# Patient Record
Sex: MALE | Race: WHITE | NOT HISPANIC OR LATINO | Employment: OTHER | URBAN - METROPOLITAN AREA
[De-identification: names, ages, dates, MRNs, and addresses within clinical notes are randomized per-mention and may not be internally consistent; named-entity substitution may affect disease eponyms.]

---

## 2017-01-18 ENCOUNTER — TELEPHONE (OUTPATIENT)
Dept: INTERNAL MEDICINE | Facility: CLINIC | Age: 57
End: 2017-01-18

## 2017-01-18 NOTE — TELEPHONE ENCOUNTER
----- Message from Antonio Domingo sent at 1/18/2017  1:23 PM EST -----  Contact: patient  [facility]

## 2017-01-18 NOTE — TELEPHONE ENCOUNTER
This has already been initiated yesterday.  Pharmacy was notified yesterday.  We are waiting on his insurance.

## 2017-01-25 ENCOUNTER — TELEPHONE (OUTPATIENT)
Dept: INTERNAL MEDICINE | Facility: CLINIC | Age: 57
End: 2017-01-25

## 2017-01-25 RX ORDER — ESCITALOPRAM OXALATE 10 MG/1
10 TABLET ORAL DAILY
Qty: 30 TABLET | Refills: 5 | Status: SHIPPED | OUTPATIENT
Start: 2017-01-25 | End: 2017-03-10 | Stop reason: SDUPTHER

## 2017-01-25 NOTE — TELEPHONE ENCOUNTER
----- Message from Antonio Domingo sent at 1/25/2017  9:04 AM EST -----  Contact: DR. SKAGGS  WOULD LIKE  LEXAPRO 10 MG @ [facility]

## 2017-01-31 RX ORDER — OMEPRAZOLE 40 MG/1
40 CAPSULE, DELAYED RELEASE ORAL DAILY
Qty: 90 CAPSULE | Refills: 3 | Status: SHIPPED | OUTPATIENT
Start: 2017-01-31 | End: 2017-03-10 | Stop reason: SDUPTHER

## 2017-02-10 ENCOUNTER — OFFICE VISIT (OUTPATIENT)
Dept: INTERNAL MEDICINE | Facility: CLINIC | Age: 57
End: 2017-02-10

## 2017-02-10 VITALS
DIASTOLIC BLOOD PRESSURE: 66 MMHG | SYSTOLIC BLOOD PRESSURE: 116 MMHG | HEIGHT: 75 IN | TEMPERATURE: 97.6 F | WEIGHT: 189 LBS | BODY MASS INDEX: 23.5 KG/M2

## 2017-02-10 DIAGNOSIS — F51.01 PRIMARY INSOMNIA: Primary | ICD-10-CM

## 2017-02-10 DIAGNOSIS — Z79.899 HIGH RISK MEDICATIONS (NOT ANTICOAGULANTS) LONG-TERM USE: ICD-10-CM

## 2017-02-10 DIAGNOSIS — F41.9 ANXIETY: ICD-10-CM

## 2017-02-10 DIAGNOSIS — F32.A DEPRESSIVE DISORDER: ICD-10-CM

## 2017-02-10 LAB
AMPHET+METHAMPHET UR QL: NEGATIVE
AMPHETAMINES UR QL: NEGATIVE
BARBITURATES UR QL SCN: NEGATIVE
BENZODIAZ UR QL SCN: POSITIVE
BUPRENORPHINE SERPL-MCNC: NEGATIVE NG/ML
CANNABINOIDS SERPL QL: NEGATIVE
COCAINE UR QL: NEGATIVE
METHADONE UR QL SCN: NEGATIVE
OPIATES UR QL: NEGATIVE
OXYCODONE UR QL SCN: NEGATIVE
PCP UR QL SCN: NEGATIVE
PROPOXYPH UR QL: NEGATIVE
TRICYCLICS UR QL SCN: NEGATIVE

## 2017-02-10 PROCEDURE — 99213 OFFICE O/P EST LOW 20 MIN: CPT | Performed by: INTERNAL MEDICINE

## 2017-02-10 PROCEDURE — 80306 DRUG TEST PRSMV INSTRMNT: CPT | Performed by: INTERNAL MEDICINE

## 2017-02-10 RX ORDER — TEMAZEPAM 30 MG/1
30 CAPSULE ORAL NIGHTLY PRN
Qty: 30 CAPSULE | Refills: 2 | Status: SHIPPED | OUTPATIENT
Start: 2017-02-10 | End: 2017-03-13 | Stop reason: SDUPTHER

## 2017-02-10 NOTE — PROGRESS NOTES
"Subjective   Tyler Doss is a 56 y.o. male.     History of Present Illness     Anxiety - taking lexapro 1/2 of 10mg and has really helped. Doesn't feel as angry, mood calmer. Hasn't needed klonopin since starting, though was not using very often anyway, as made him feel quite sluggish    Insomnia - takes temazepam nightly, no missed doses. Sleeping well, tries to get 7-8hrs a night    Hyperlipidemia - off the statins as they made him ache. Is trying to eat healthy and exercise regularly and keep weight off    The following portions of the patient's history were reviewed and updated as appropriate: allergies, current medications, past family history, past medical history, past social history, past surgical history and problem list.    Review of Systems   Constitutional: Negative for activity change, appetite change and unexpected weight change.   Musculoskeletal: Negative for myalgias.   Psychiatric/Behavioral: Negative for dysphoric mood. The patient is not nervous/anxious.         Mood better       Objective   Blood pressure 116/66, temperature 97.6 °F (36.4 °C), temperature source Temporal Artery , height 74.6\" (189.5 cm), weight 189 lb (85.7 kg).  Physical Exam   Constitutional: He is oriented to person, place, and time. He appears well-developed and well-nourished. No distress.   HENT:   Head: Normocephalic and atraumatic.   Eyes: Conjunctivae and EOM are normal.   Cardiovascular: Normal rate, regular rhythm and normal heart sounds.  Exam reveals no gallop and no friction rub.    No murmur heard.  Pulmonary/Chest: Effort normal and breath sounds normal. No respiratory distress. He has no wheezes.   Neurological: He is alert and oriented to person, place, and time.   Skin: Skin is warm and dry. He is not diaphoretic.   Psychiatric: He has a normal mood and affect. His behavior is normal. Judgment and thought content normal.       Assessment/Plan   Tyler was seen today for med refill.    Diagnoses and all " orders for this visit:    Primary insomnia. Pt has already signed controlled substance contract. Hema done today and appropriate. UDS due today  -     temazepam (RESTORIL) 30 MG capsule; Take 1 capsule by mouth At Night As Needed for sleep.  -     Urine Drug Screen      Anxiety/Depressive disorder - doing well on lexapro    He will schedule physical

## 2017-02-21 ENCOUNTER — OFFICE VISIT (OUTPATIENT)
Dept: INTERNAL MEDICINE | Facility: CLINIC | Age: 57
End: 2017-02-21

## 2017-02-21 VITALS
SYSTOLIC BLOOD PRESSURE: 100 MMHG | TEMPERATURE: 98.9 F | WEIGHT: 187 LBS | DIASTOLIC BLOOD PRESSURE: 76 MMHG | BODY MASS INDEX: 23.62 KG/M2

## 2017-02-21 DIAGNOSIS — R53.83 FATIGUE, UNSPECIFIED TYPE: ICD-10-CM

## 2017-02-21 DIAGNOSIS — R59.1 LYMPHADENOPATHY OF HEAD AND NECK: ICD-10-CM

## 2017-02-21 DIAGNOSIS — J20.9 ACUTE BRONCHITIS, UNSPECIFIED ORGANISM: ICD-10-CM

## 2017-02-21 DIAGNOSIS — R05.9 COUGH: Primary | ICD-10-CM

## 2017-02-21 DIAGNOSIS — R09.81 NASAL CONGESTION: ICD-10-CM

## 2017-02-21 DIAGNOSIS — R06.7 SNEEZING: ICD-10-CM

## 2017-02-21 DIAGNOSIS — R51.9 HEADACHE, UNSPECIFIED HEADACHE TYPE: ICD-10-CM

## 2017-02-21 DIAGNOSIS — R52 BODY ACHES: ICD-10-CM

## 2017-02-21 LAB
BASOPHILS # BLD AUTO: 0.01 10*3/MM3 (ref 0–0.2)
BASOPHILS NFR BLD AUTO: 0.1 % (ref 0–1)
DEPRECATED RDW RBC AUTO: 48 FL (ref 37–54)
EOSINOPHIL # BLD AUTO: 0.33 10*3/MM3 (ref 0.1–0.3)
EOSINOPHIL NFR BLD AUTO: 4.9 % (ref 0–3)
ERYTHROCYTE [DISTWIDTH] IN BLOOD BY AUTOMATED COUNT: 13.6 % (ref 11.3–14.5)
EXPIRATION DATE: NORMAL
EXPIRATION DATE: NORMAL
FLUAV AG NPH QL: NEGATIVE
FLUBV AG NPH QL: NEGATIVE
HCT VFR BLD AUTO: 44.7 % (ref 38.9–50.9)
HGB BLD-MCNC: 14.5 G/DL (ref 13.1–17.5)
IMM GRANULOCYTES # BLD: 0.02 10*3/MM3 (ref 0–0.03)
IMM GRANULOCYTES NFR BLD: 0.3 % (ref 0–0.6)
INTERNAL CONTROL: NORMAL
INTERNAL CONTROL: NORMAL
LYMPHOCYTES # BLD AUTO: 1.46 10*3/MM3 (ref 0.6–4.8)
LYMPHOCYTES NFR BLD AUTO: 21.9 % (ref 24–44)
Lab: NORMAL
Lab: NORMAL
MCH RBC QN AUTO: 30.9 PG (ref 27–31)
MCHC RBC AUTO-ENTMCNC: 32.4 G/DL (ref 32–36)
MCV RBC AUTO: 95.1 FL (ref 80–99)
MONOCYTES # BLD AUTO: 1.02 10*3/MM3 (ref 0–1)
MONOCYTES NFR BLD AUTO: 15.3 % (ref 0–12)
NEUTROPHILS # BLD AUTO: 3.83 10*3/MM3 (ref 1.5–8.3)
NEUTROPHILS NFR BLD AUTO: 57.5 % (ref 41–71)
PLATELET # BLD AUTO: 287 10*3/MM3 (ref 150–450)
PMV BLD AUTO: 11.2 FL (ref 6–12)
RBC # BLD AUTO: 4.7 10*6/MM3 (ref 4.2–5.76)
S PYO AG THROAT QL: NEGATIVE
WBC NRBC COR # BLD: 6.67 10*3/MM3 (ref 3.5–10.8)

## 2017-02-21 PROCEDURE — 87804 INFLUENZA ASSAY W/OPTIC: CPT | Performed by: NURSE PRACTITIONER

## 2017-02-21 PROCEDURE — 99214 OFFICE O/P EST MOD 30 MIN: CPT | Performed by: NURSE PRACTITIONER

## 2017-02-21 PROCEDURE — 85025 COMPLETE CBC W/AUTO DIFF WBC: CPT | Performed by: NURSE PRACTITIONER

## 2017-02-21 PROCEDURE — 87880 STREP A ASSAY W/OPTIC: CPT | Performed by: NURSE PRACTITIONER

## 2017-02-21 RX ORDER — DEXTROMETHORPHAN HYDROBROMIDE AND PROMETHAZINE HYDROCHLORIDE 15; 6.25 MG/5ML; MG/5ML
10 SYRUP ORAL 3 TIMES DAILY PRN
Qty: 180 ML | Refills: 0 | Status: SHIPPED | OUTPATIENT
Start: 2017-02-21 | End: 2017-05-17

## 2017-02-21 RX ORDER — CLARITHROMYCIN 500 MG/1
500 TABLET, COATED ORAL 2 TIMES DAILY
Qty: 20 TABLET | Refills: 0 | Status: SHIPPED | OUTPATIENT
Start: 2017-02-21 | End: 2017-05-17

## 2017-02-21 NOTE — PROGRESS NOTES
Subjective   Tyler Doss is a 56 y.o. male    Chief Complaint   Patient presents with   • Cough     Productive dark green sputum   • Generalized Body Aches   • Headache   • sneezing   • Nasal Congestion   • URI     Chest congestion   • Nausea   • Fatigue   • Chills     Chills on Sat. Took 2 Tylenol this morning     URI    This is a new problem. Episode onset: 3 days ago. The problem has been gradually improving. There has been no fever (but has felt chilled). Associated symptoms include congestion, coughing (productive - green/thick), ear pain, headaches, a plugged ear sensation, rhinorrhea, sinus pain, a sore throat and swollen glands. Pertinent negatives include no abdominal pain, chest pain, diarrhea, dysuria, joint pain, joint swelling, nausea, neck pain, rash, sneezing, vomiting or wheezing. Treatments tried: OTC cold meds. The treatment provided no relief.      Son recently with flu A.  He was given a script for Tamiflu, but it caused difficulty breathing after one dose so he stopped it.    The following portions of the patient's history were reviewed and updated as appropriate: allergies, current medications, past family history, past medical history, past social history, past surgical history and problem list.    Current Outpatient Prescriptions:   •  clarithromycin (BIAXIN) 500 MG tablet, Take 1 tablet by mouth 2 (Two) Times a Day., Disp: 20 tablet, Rfl: 0  •  clonazePAM (KLONOPIN) 0.5 MG tablet, 1 qd prn anxiety, Disp: 30 tablet, Rfl: 2  •  DYMISTA 137-50 MCG/ACT suspension, 137 bottles into each nostril 2 (Two) Times a Day., Disp: 1 bottle, Rfl: 11  •  escitalopram (LEXAPRO) 10 MG tablet, Take 1 tablet by mouth Daily., Disp: 30 tablet, Rfl: 5  •  omeprazole (priLOSEC) 40 MG capsule, Take 1 capsule by mouth Daily., Disp: 90 capsule, Rfl: 3  •  promethazine-dextromethorphan (PROMETHAZINE-DM) 6.25-15 MG/5ML syrup, Take 10 mL by mouth 3 (Three) Times a Day As Needed for cough., Disp: 180 mL, Rfl: 0  •   propranolol (INDERAL) 10 MG tablet, Take 1 tablet by mouth as needed (for anxiety)., Disp: 90 tablet, Rfl: 3  •  temazepam (RESTORIL) 30 MG capsule, Take 1 capsule by mouth At Night As Needed for sleep., Disp: 30 capsule, Rfl: 2  •  valACYclovir (VALTREX) 1000 MG tablet, Take 1 tablet by mouth 3 (Three) Times a Day., Disp: 21 tablet, Rfl: 0  •  vilazodone (VIIBRYD) 20 MG tablet tablet, 1/2 qd x 1week, then 1 qd x 1 week, then 2 qd, Disp: 39 tablet, Rfl: 0     Review of Systems   Constitutional: Negative for chills, fatigue and fever.   HENT: Positive for congestion, ear pain, rhinorrhea and sore throat. Negative for sneezing.    Respiratory: Positive for cough (productive - green/thick). Negative for chest tightness, shortness of breath and wheezing.    Cardiovascular: Negative for chest pain.   Gastrointestinal: Negative for abdominal pain, diarrhea, nausea and vomiting.   Endocrine: Negative for cold intolerance and heat intolerance.   Genitourinary: Negative for dysuria.   Musculoskeletal: Negative for arthralgias, joint pain and neck pain.   Skin: Negative for rash.   Neurological: Positive for headaches. Negative for dizziness.       Objective   Physical Exam   Constitutional: He is oriented to person, place, and time. He appears well-developed and well-nourished.   HENT:   Head: Normocephalic and atraumatic.   Right Ear: A middle ear effusion is present.   Left Ear: A middle ear effusion is present.   Nose: Right sinus exhibits no maxillary sinus tenderness and no frontal sinus tenderness. Left sinus exhibits no maxillary sinus tenderness and no frontal sinus tenderness.   Mouth/Throat: Posterior oropharyngeal edema and posterior oropharyngeal erythema present.   Eyes: Conjunctivae and EOM are normal. Pupils are equal, round, and reactive to light.   Neck: Normal range of motion.   Cardiovascular: Normal rate, regular rhythm and normal heart sounds.    Pulmonary/Chest: Effort normal and breath sounds normal.    Abdominal: Soft. Bowel sounds are normal.   Musculoskeletal: Normal range of motion.   Lymphadenopathy:     He has cervical adenopathy.        Right cervical: Superficial cervical adenopathy present.        Left cervical: Superficial cervical adenopathy present.   Neurological: He is alert and oriented to person, place, and time. He has normal reflexes.   Skin: Skin is warm and dry.   Psychiatric: He has a normal mood and affect. His behavior is normal. Judgment and thought content normal.     Vitals:    02/21/17 1351   BP: 100/76   Temp: 98.9 °F (37.2 °C)   TempSrc: Temporal Artery    Weight: 187 lb (84.8 kg)         Assessment/Plan   Tyler was seen today for cough, generalized body aches, headache, sneezing, nasal congestion, uri, nausea, fatigue and chills.    Diagnoses and all orders for this visit:    Cough  -     POC Influenza A / B  -     promethazine-dextromethorphan (PROMETHAZINE-DM) 6.25-15 MG/5ML syrup; Take 10 mL by mouth 3 (Three) Times a Day As Needed for cough.    Body aches  -     POC Influenza A / B    Fatigue, unspecified type  -     POC Influenza A / B    Headache, unspecified headache type  -     POC Influenza A / B    Sneezing  -     POC Influenza A / B    Nasal congestion  -     POC Influenza A / B    Lymphadenopathy of head and neck  -     CBC & Differential  -     CBC Auto Differential  -     POC Rapid Strep A    Acute bronchitis, unspecified organism  -     clarithromycin (BIAXIN) 500 MG tablet; Take 1 tablet by mouth 2 (Two) Times a Day.  -     promethazine-dextromethorphan (PROMETHAZINE-DM) 6.25-15 MG/5ML syrup; Take 10 mL by mouth 3 (Three) Times a Day As Needed for cough.    Rapid strep and flu negative  CBC sent due to enlarged lymph nodes  Meds as directed  No work until Thursday  F/U if sx's worsen or do not improve

## 2017-03-09 RX ORDER — AZELASTINE 1 MG/ML
2 SPRAY, METERED NASAL 2 TIMES DAILY
Qty: 1 EACH | Refills: 12 | Status: SHIPPED | OUTPATIENT
Start: 2017-03-09 | End: 2018-03-27 | Stop reason: SDUPTHER

## 2017-03-09 RX ORDER — FLUTICASONE PROPIONATE 50 MCG
2 SPRAY, SUSPENSION (ML) NASAL DAILY
Qty: 1 EACH | Refills: 12 | Status: SHIPPED | OUTPATIENT
Start: 2017-03-09 | End: 2017-04-08

## 2017-03-10 ENCOUNTER — TELEPHONE (OUTPATIENT)
Dept: INTERNAL MEDICINE | Facility: CLINIC | Age: 57
End: 2017-03-10

## 2017-03-10 RX ORDER — ESCITALOPRAM OXALATE 10 MG/1
10 TABLET ORAL DAILY
Qty: 90 TABLET | Refills: 1 | Status: SHIPPED | OUTPATIENT
Start: 2017-03-10 | End: 2017-05-17 | Stop reason: SDUPTHER

## 2017-03-10 RX ORDER — OMEPRAZOLE 40 MG/1
40 CAPSULE, DELAYED RELEASE ORAL DAILY
Qty: 90 CAPSULE | Refills: 3 | Status: SHIPPED | OUTPATIENT
Start: 2017-03-10 | End: 2018-03-27 | Stop reason: SDUPTHER

## 2017-03-10 NOTE — TELEPHONE ENCOUNTER
----- Message from Augusta Frederick sent at 3/10/2017  3:39 PM EST -----  Patient's spouse called to get a 90 day refill of his lexapro 10mg, omeprazole 40mg, and temazepam 30mg called in to [facility] please. Thanks

## 2017-03-13 RX ORDER — TEMAZEPAM 30 MG/1
30 CAPSULE ORAL NIGHTLY PRN
Qty: 90 CAPSULE | Refills: 0 | Status: SHIPPED | OUTPATIENT
Start: 2017-03-13 | End: 2017-06-19 | Stop reason: SDUPTHER

## 2017-03-28 ENCOUNTER — TELEPHONE (OUTPATIENT)
Dept: INTERNAL MEDICINE | Facility: CLINIC | Age: 57
End: 2017-03-28

## 2017-03-28 DIAGNOSIS — Z00.00 ROUTINE MEDICAL EXAM: Primary | ICD-10-CM

## 2017-03-28 DIAGNOSIS — Z00.00 ENCOUNTER FOR ANNUAL PHYSICAL EXAM: Primary | ICD-10-CM

## 2017-03-28 NOTE — TELEPHONE ENCOUNTER
----- Message from Lisandra Rodriguez sent at 3/28/2017 12:48 PM EDT -----  PT COMING IN FOR LABS ON [id number]

## 2017-04-14 DIAGNOSIS — R73.09 ELEVATED GLUCOSE: Primary | ICD-10-CM

## 2017-04-16 DIAGNOSIS — Z00.00 ROUTINE ADULT HEALTH MAINTENANCE: Primary | ICD-10-CM

## 2017-05-17 ENCOUNTER — OFFICE VISIT (OUTPATIENT)
Dept: INTERNAL MEDICINE | Facility: CLINIC | Age: 57
End: 2017-05-17

## 2017-05-17 VITALS
DIASTOLIC BLOOD PRESSURE: 78 MMHG | WEIGHT: 189 LBS | RESPIRATION RATE: 12 BRPM | SYSTOLIC BLOOD PRESSURE: 112 MMHG | HEART RATE: 69 BPM | HEIGHT: 74 IN | OXYGEN SATURATION: 97 % | BODY MASS INDEX: 24.26 KG/M2 | TEMPERATURE: 97.4 F

## 2017-05-17 DIAGNOSIS — G47.33 OSA (OBSTRUCTIVE SLEEP APNEA): ICD-10-CM

## 2017-05-17 DIAGNOSIS — Z00.00 ENCOUNTER FOR ANNUAL PHYSICAL EXAM: Primary | ICD-10-CM

## 2017-05-17 DIAGNOSIS — F51.01 PRIMARY INSOMNIA: ICD-10-CM

## 2017-05-17 DIAGNOSIS — N52.9 ERECTILE DYSFUNCTION, UNSPECIFIED ERECTILE DYSFUNCTION TYPE: ICD-10-CM

## 2017-05-17 DIAGNOSIS — R53.83 OTHER FATIGUE: ICD-10-CM

## 2017-05-17 LAB
BILIRUB BLD-MCNC: NEGATIVE MG/DL
CLARITY, POC: CLEAR
COLOR UR: YELLOW
GLUCOSE UR STRIP-MCNC: NEGATIVE MG/DL
KETONES UR QL: NEGATIVE
LEUKOCYTE EST, POC: NEGATIVE
NITRITE UR-MCNC: NEGATIVE MG/ML
PH UR: 7 [PH] (ref 5–8)
PROT UR STRIP-MCNC: NEGATIVE MG/DL
RBC # UR STRIP: NEGATIVE /UL
SP GR UR: 1.01 (ref 1–1.03)
UROBILINOGEN UR QL: NORMAL

## 2017-05-17 PROCEDURE — 81003 URINALYSIS AUTO W/O SCOPE: CPT | Performed by: INTERNAL MEDICINE

## 2017-05-17 PROCEDURE — 99396 PREV VISIT EST AGE 40-64: CPT | Performed by: INTERNAL MEDICINE

## 2017-05-17 RX ORDER — TEMAZEPAM 30 MG/1
30 CAPSULE ORAL NIGHTLY PRN
Qty: 90 CAPSULE | Refills: 0 | Status: CANCELLED | OUTPATIENT
Start: 2017-05-17

## 2017-05-17 RX ORDER — ESCITALOPRAM OXALATE 10 MG/1
10 TABLET ORAL DAILY
Qty: 90 TABLET | Refills: 3 | Status: SHIPPED | OUTPATIENT
Start: 2017-05-17 | End: 2017-09-20 | Stop reason: SINTOL

## 2017-05-20 ENCOUNTER — LAB (OUTPATIENT)
Dept: LAB | Facility: HOSPITAL | Age: 57
End: 2017-05-20

## 2017-05-20 DIAGNOSIS — R53.83 OTHER FATIGUE: ICD-10-CM

## 2017-05-20 DIAGNOSIS — N52.9 ERECTILE DYSFUNCTION, UNSPECIFIED ERECTILE DYSFUNCTION TYPE: ICD-10-CM

## 2017-05-20 LAB — TESTOST SERPL-MCNC: 629.38 NG/DL (ref 86.98–780.1)

## 2017-05-20 PROCEDURE — 84443 ASSAY THYROID STIM HORMONE: CPT | Performed by: INTERNAL MEDICINE

## 2017-05-20 PROCEDURE — 84403 ASSAY OF TOTAL TESTOSTERONE: CPT

## 2017-05-20 PROCEDURE — 84153 ASSAY OF PSA TOTAL: CPT | Performed by: INTERNAL MEDICINE

## 2017-05-20 PROCEDURE — 80053 COMPREHEN METABOLIC PANEL: CPT | Performed by: INTERNAL MEDICINE

## 2017-05-20 PROCEDURE — 36415 COLL VENOUS BLD VENIPUNCTURE: CPT

## 2017-05-21 DIAGNOSIS — Z00.00 ROUTINE GENERAL MEDICAL EXAMINATION AT A HEALTH CARE FACILITY: Primary | ICD-10-CM

## 2017-05-21 LAB
ALBUMIN SERPL-MCNC: 4.5 G/DL (ref 3.2–4.8)
ALBUMIN/GLOB SERPL: 1.9 G/DL (ref 1.5–2.5)
ALP SERPL-CCNC: 46 U/L (ref 25–100)
ALT SERPL W P-5'-P-CCNC: 20 U/L (ref 7–40)
ANION GAP SERPL CALCULATED.3IONS-SCNC: 12 MMOL/L (ref 3–11)
AST SERPL-CCNC: 21 U/L (ref 0–33)
BILIRUB SERPL-MCNC: 0.8 MG/DL (ref 0.3–1.2)
BUN BLD-MCNC: 16 MG/DL (ref 9–23)
BUN/CREAT SERPL: 17.8 (ref 7–25)
CALCIUM SPEC-SCNC: 10.6 MG/DL (ref 8.7–10.4)
CHLORIDE SERPL-SCNC: 112 MMOL/L (ref 99–109)
CO2 SERPL-SCNC: 22 MMOL/L (ref 20–31)
CREAT BLD-MCNC: 0.9 MG/DL (ref 0.6–1.3)
GFR SERPL CREATININE-BSD FRML MDRD: 87 ML/MIN/1.73
GLOBULIN UR ELPH-MCNC: 2.4 GM/DL
GLUCOSE BLD-MCNC: 107 MG/DL (ref 70–100)
POTASSIUM BLD-SCNC: 4.4 MMOL/L (ref 3.5–5.5)
PROT SERPL-MCNC: 6.9 G/DL (ref 5.7–8.2)
PSA SERPL-MCNC: 0.25 NG/ML (ref 0–4)
SODIUM BLD-SCNC: 146 MMOL/L (ref 132–146)
TSH SERPL DL<=0.05 MIU/L-ACNC: 2.79 MIU/ML (ref 0.35–5.35)

## 2017-06-16 ENCOUNTER — TELEPHONE (OUTPATIENT)
Dept: INTERNAL MEDICINE | Facility: CLINIC | Age: 57
End: 2017-06-16

## 2017-06-16 NOTE — TELEPHONE ENCOUNTER
lmom for patient to call me back about this. We will have to look outside of [facility] for other sleep specialists.

## 2017-06-16 NOTE — TELEPHONE ENCOUNTER
Dr. Doss called wanting to know if there is another sleep study location that could get Mr. Doss sooner than Nov, please call to advise

## 2017-06-19 ENCOUNTER — TELEPHONE (OUTPATIENT)
Dept: INTERNAL MEDICINE | Facility: CLINIC | Age: 57
End: 2017-06-19

## 2017-06-19 RX ORDER — TEMAZEPAM 30 MG/1
30 CAPSULE ORAL NIGHTLY PRN
Qty: 90 CAPSULE | Refills: 0 | Status: SHIPPED | OUTPATIENT
Start: 2017-06-19 | End: 2017-09-20 | Stop reason: SDUPTHER

## 2017-09-15 ENCOUNTER — TELEPHONE (OUTPATIENT)
Dept: INTERNAL MEDICINE | Facility: CLINIC | Age: 57
End: 2017-09-15

## 2017-09-15 NOTE — TELEPHONE ENCOUNTER
PT NEEDS REFILL TEMAZEPAM 30 MG SENT TO MAIL ORDER PT DID NOT KNOW THE MAIL ORDER PHARMACY NAME AND HE NEEDS A 90 DAY SUPPY

## 2017-09-15 NOTE — TELEPHONE ENCOUNTER
Pn: he needs a 3 month follow up for his med refills.  He has enough to get him through until his appointment.

## 2017-09-20 ENCOUNTER — OFFICE VISIT (OUTPATIENT)
Dept: INTERNAL MEDICINE | Facility: CLINIC | Age: 57
End: 2017-09-20

## 2017-09-20 VITALS
TEMPERATURE: 97.7 F | BODY MASS INDEX: 25.82 KG/M2 | SYSTOLIC BLOOD PRESSURE: 102 MMHG | WEIGHT: 201.2 LBS | HEIGHT: 74 IN | DIASTOLIC BLOOD PRESSURE: 68 MMHG

## 2017-09-20 DIAGNOSIS — K21.9 GASTROESOPHAGEAL REFLUX DISEASE WITHOUT ESOPHAGITIS: ICD-10-CM

## 2017-09-20 DIAGNOSIS — R73.09 ELEVATED GLUCOSE: ICD-10-CM

## 2017-09-20 DIAGNOSIS — F51.01 PRIMARY INSOMNIA: Primary | ICD-10-CM

## 2017-09-20 DIAGNOSIS — F41.9 ANXIETY: ICD-10-CM

## 2017-09-20 DIAGNOSIS — F32.89 OTHER DEPRESSION: ICD-10-CM

## 2017-09-20 DIAGNOSIS — E78.00 PURE HYPERCHOLESTEROLEMIA: ICD-10-CM

## 2017-09-20 LAB
CRP SERPL-MCNC: <0.012 MG/DL (ref 0–0.5)
HBA1C MFR BLD: 5.5 % (ref 4.8–5.6)

## 2017-09-20 PROCEDURE — 36415 COLL VENOUS BLD VENIPUNCTURE: CPT | Performed by: INTERNAL MEDICINE

## 2017-09-20 PROCEDURE — 83036 HEMOGLOBIN GLYCOSYLATED A1C: CPT | Performed by: INTERNAL MEDICINE

## 2017-09-20 PROCEDURE — 86141 C-REACTIVE PROTEIN HS: CPT | Performed by: INTERNAL MEDICINE

## 2017-09-20 PROCEDURE — 99214 OFFICE O/P EST MOD 30 MIN: CPT | Performed by: INTERNAL MEDICINE

## 2017-09-20 RX ORDER — BUPROPION HYDROCHLORIDE 150 MG/1
150 TABLET ORAL DAILY
Qty: 30 TABLET | Refills: 5 | Status: SHIPPED | OUTPATIENT
Start: 2017-09-20 | End: 2017-12-15 | Stop reason: SINTOL

## 2017-09-20 RX ORDER — RANITIDINE 150 MG/1
150 CAPSULE ORAL 2 TIMES DAILY
Qty: 180 CAPSULE | Refills: 1 | Status: SHIPPED | OUTPATIENT
Start: 2017-09-20 | End: 2018-03-30

## 2017-09-20 RX ORDER — CLONAZEPAM 0.5 MG/1
TABLET ORAL
Qty: 30 TABLET | Refills: 2 | Status: SHIPPED | OUTPATIENT
Start: 2017-09-20 | End: 2018-06-15 | Stop reason: SDUPTHER

## 2017-09-20 RX ORDER — TEMAZEPAM 30 MG/1
30 CAPSULE ORAL NIGHTLY PRN
Qty: 90 CAPSULE | Refills: 0 | Status: SHIPPED | OUTPATIENT
Start: 2017-09-20 | End: 2017-12-15 | Stop reason: SDUPTHER

## 2017-09-20 RX ORDER — CLONAZEPAM 0.5 MG/1
0.5 TABLET ORAL 2 TIMES DAILY PRN
COMMUNITY
End: 2017-09-20 | Stop reason: SDUPTHER

## 2017-09-20 NOTE — PROGRESS NOTES
"Subjective   Tyler Doss is a 56 y.o. male.     History of Present Illness   Here for f/u on insomnia and anxiety    Insomnia - he is using temazepam at night and works well. Sleeping well overall    Anxiety - uses klonopin very rarely. Last filled in 12/16, but does need refill as only has a few left. He had been on the lexapro 1/2 but felt very sleepy w/ it, even the low dose, so had to stop  .still some depression but not too bad. Trying to exercise daily and get plenty of rest.  Had been on Wellbutrin years ago and remembers having hair loss w/ it, but is willing to try again. He feels like most of the SSRIs make him tired    gerd - he has been on omeprazole daily. If he misses a dose he will get GERD. Tries to watch diet. He is wondering about long-term use of it though. Did have EGD in 9/14 which was neg    Constipation - uses miralax daily and does help but still some trouble periodically if he eats too much in one setting. He had colon in 8/15- dilated and slightly tortuous colon. repeat in 5 yrs     Wife wants him to get a1c and hsCRP (FH of CAD and DM)    The following portions of the patient's history were reviewed and updated as appropriate: allergies, current medications, past family history, past medical history, past social history, past surgical history and problem list.    Review of Systems   Constitutional: Negative for activity change, appetite change, chills, diaphoresis and unexpected weight change (has lost wt intentionally).   Gastrointestinal: Positive for constipation (has to take miralx daily to stay regular).        Miralax daily, but still some trouble w/ BMs backing up still   Musculoskeletal: Positive for arthralgias.   Psychiatric/Behavioral: Positive for dysphoric mood and sleep disturbance. Negative for agitation. The patient is nervous/anxious.        Objective   Blood pressure 102/68, temperature 97.7 °F (36.5 °C), temperature source Temporal Artery , height 74.4\" (189 cm), " weight 201 lb 3.2 oz (91.3 kg).  Physical Exam   Constitutional: He is oriented to person, place, and time. He appears well-developed and well-nourished. No distress.   HENT:   Head: Normocephalic and atraumatic.   Eyes: Conjunctivae and EOM are normal.   Cardiovascular: Normal rate, regular rhythm and normal heart sounds.  Exam reveals no gallop and no friction rub.    No murmur heard.  Pulmonary/Chest: Effort normal and breath sounds normal. No respiratory distress. He has no wheezes.   Neurological: He is alert and oriented to person, place, and time.   Skin: Skin is warm and dry. He is not diaphoretic.   Psychiatric: He has a normal mood and affect. His behavior is normal. Judgment and thought content normal.       Assessment/Plan   Tyler was seen today for med refill.    Diagnoses and all orders for this visit:    Primary insomnia -Pt has already signed controlled substance contract. Hema done today and appropriate. UDS due 2/18  -     temazepam (RESTORIL) 30 MG capsule; Take 1 capsule by mouth At Night As Needed for Sleep.    Anxiety - uses klonopin just occasionally  -     clonazePAM (KlonoPIN) 0.5 MG tablet; 1 qd prn anxiety    Pure hypercholesterolemia - statin intolerant  -     High Sensitivity CRP    Elevated glucose  -     Hemoglobin A1c    Gastroesophageal reflux disease without esophagitis - try to decrease omeprazole to qod then furhter as tolerated and will use zantac instead  -     ranitidine (ZANTAC) 150 MG capsule; Take 1 capsule by mouth 2 (Two) Times a Day.    Other depression - will tyr low dose wellbutrin in am  -     buPROPion XL (WELLBUTRIN XL) 150 MG 24 hr tablet; Take 1 tablet by mouth Daily.    Constipation - chronic, h/o tortuous colon - continue miralax. Add probiotic. Might try linzess or amitiza again -we did use in past but pt can't remember how they worked - will review chart    He declines flu vaccine

## 2017-09-24 RX ORDER — LUBIPROSTONE 24 UG/1
24 CAPSULE ORAL 2 TIMES DAILY WITH MEALS
Qty: 60 CAPSULE | Refills: 5 | Status: SHIPPED | OUTPATIENT
Start: 2017-09-24 | End: 2017-12-15

## 2017-09-29 ENCOUNTER — TELEPHONE (OUTPATIENT)
Dept: INTERNAL MEDICINE | Facility: CLINIC | Age: 57
End: 2017-09-29

## 2017-09-29 NOTE — TELEPHONE ENCOUNTER
----- Message from Ya Shafer MD sent at 9/28/2017  4:21 PM EDT -----  Regarding: RE: Justin CHURCHILL  No - movantik is more for patients on narcotics who have constipation, so I think the amitiza would be better option  ----- Message -----     From: Keila Brooke MA     Sent: 9/28/2017  10:52 AM       To: Ya Shafer MD  Subject: Justin CHURCHILL                                       When I started the PA for amitiza it popped up that movantik was covered by his insurance. Do you want to try that?

## 2017-10-02 ENCOUNTER — TELEPHONE (OUTPATIENT)
Dept: INTERNAL MEDICINE | Facility: CLINIC | Age: 57
End: 2017-10-02

## 2017-11-10 ENCOUNTER — TELEPHONE (OUTPATIENT)
Dept: INTERNAL MEDICINE | Facility: CLINIC | Age: 57
End: 2017-11-10

## 2017-12-15 ENCOUNTER — OFFICE VISIT (OUTPATIENT)
Dept: INTERNAL MEDICINE | Facility: CLINIC | Age: 57
End: 2017-12-15

## 2017-12-15 VITALS
DIASTOLIC BLOOD PRESSURE: 68 MMHG | SYSTOLIC BLOOD PRESSURE: 112 MMHG | WEIGHT: 208 LBS | BODY MASS INDEX: 26.69 KG/M2 | TEMPERATURE: 97.1 F | HEIGHT: 74 IN

## 2017-12-15 DIAGNOSIS — F51.01 PRIMARY INSOMNIA: Primary | ICD-10-CM

## 2017-12-15 PROCEDURE — 99213 OFFICE O/P EST LOW 20 MIN: CPT | Performed by: INTERNAL MEDICINE

## 2017-12-15 RX ORDER — TEMAZEPAM 30 MG/1
30 CAPSULE ORAL NIGHTLY PRN
Qty: 90 CAPSULE | Refills: 0 | Status: SHIPPED | OUTPATIENT
Start: 2017-12-15 | End: 2018-03-16 | Stop reason: SDUPTHER

## 2017-12-15 NOTE — PROGRESS NOTES
"Subjective   Tyler Doss is a 57 y.o. male.     History of Present Illness   Here for f/u on insomnia and anxiety     Insomnia - he is using temazepam at night and works well. Sleeping well overall     Anxiety - uses klonopin very rarely. Last filled in 11/17 and took for a few days but hasn't needed much since then. In a new job and likes it, not been as stressful as his last job.      Depression - he tried wellbutrin but made him feel dizzy so had stopped but feels he is doing ok off antidepressants     Constipation - uses fiber daily, and  miralax prn and is doing okj w/ BMs right now. He had colon in 8/15- dilated and slightly tortuous colon. repeat in 5 yrs     The following portions of the patient's history were reviewed and updated as appropriate: allergies, current medications, past family history, past medical history, past social history, past surgical history and problem list.    Review of Systems   Constitutional: Positive for unexpected weight change (gained a few pounds). Negative for activity change and appetite change.   Neurological: Positive for dizziness (w/ wellbutrin).   Psychiatric/Behavioral: Positive for sleep disturbance. Negative for dysphoric mood. The patient is not nervous/anxious (just occasionally).        Objective   Blood pressure 112/68, temperature 97.1 °F (36.2 °C), temperature source Temporal Artery , height 189 cm (74.4\"), weight 94.3 kg (208 lb).  Physical Exam   Constitutional: He is oriented to person, place, and time. He appears well-developed and well-nourished. No distress.   HENT:   Head: Normocephalic and atraumatic.   Eyes: Conjunctivae and EOM are normal.   Cardiovascular: Normal rate, regular rhythm and normal heart sounds.  Exam reveals no gallop and no friction rub.    No murmur heard.  Pulmonary/Chest: Effort normal and breath sounds normal. No respiratory distress. He has no wheezes.   Neurological: He is alert and oriented to person, place, and time.   Skin: " Skin is warm and dry. He is not diaphoretic.   Psychiatric: He has a normal mood and affect. His behavior is normal. Judgment and thought content normal.       Assessment/Plan   Tyler was seen today for med refill.    Diagnoses and all orders for this visit:    Primary insomnia    Other orders  -     temazepam (RESTORIL) 30 MG capsule; Take 1 capsule by mouth At Night As Needed for Sleep.    #90/NR - given to pt to take to [facility]    Pt has already signed controlled substance contract. Hema done today and appropriate. UDS due 2/18    He declines flu shot

## 2018-03-06 ENCOUNTER — OFFICE VISIT (OUTPATIENT)
Dept: INTERNAL MEDICINE | Facility: CLINIC | Age: 58
End: 2018-03-06

## 2018-03-06 VITALS
WEIGHT: 207.4 LBS | DIASTOLIC BLOOD PRESSURE: 62 MMHG | RESPIRATION RATE: 16 BRPM | BODY MASS INDEX: 26.62 KG/M2 | HEIGHT: 74 IN | TEMPERATURE: 98.5 F | OXYGEN SATURATION: 99 % | HEART RATE: 88 BPM | SYSTOLIC BLOOD PRESSURE: 108 MMHG

## 2018-03-06 DIAGNOSIS — J10.1 INFLUENZA B: Primary | ICD-10-CM

## 2018-03-06 LAB
EXPIRATION DATE: NORMAL
FLUAV AG NPH QL: NEGATIVE
FLUBV AG NPH QL: POSITIVE
INTERNAL CONTROL: NORMAL
Lab: NORMAL

## 2018-03-06 PROCEDURE — 87804 INFLUENZA ASSAY W/OPTIC: CPT | Performed by: NURSE PRACTITIONER

## 2018-03-06 PROCEDURE — 99214 OFFICE O/P EST MOD 30 MIN: CPT | Performed by: NURSE PRACTITIONER

## 2018-03-06 RX ORDER — BROMPHENIRAMINE MALEATE, PSEUDOEPHEDRINE HYDROCHLORIDE, AND DEXTROMETHORPHAN HYDROBROMIDE 2; 30; 10 MG/5ML; MG/5ML; MG/5ML
10 SYRUP ORAL 4 TIMES DAILY PRN
Qty: 118 ML | Refills: 0 | Status: SHIPPED | OUTPATIENT
Start: 2018-03-06 | End: 2018-03-16 | Stop reason: SDDI

## 2018-03-06 RX ORDER — OSELTAMIVIR PHOSPHATE 75 MG/1
75 CAPSULE ORAL 2 TIMES DAILY
Qty: 10 CAPSULE | Refills: 0 | Status: SHIPPED | OUTPATIENT
Start: 2018-03-06 | End: 2018-03-11

## 2018-03-06 NOTE — PROGRESS NOTES
Subjective   Tyler Doss is a 57 y.o. male    Chief Complaint   Patient presents with   • Cough/chest congestion     sx's started Saturday   • Nasal Congestion     runny nose, drainage   • sneezing   • Sore Throat   • Generalized Body Aches   • Fatigue     URI    This is a new problem. Episode onset: 2-3 days. The problem has been gradually worsening. Maximum temperature: no measured temp, but has had chills. Associated symptoms include congestion, coughing, headaches, joint pain, rhinorrhea, sinus pain and a sore throat. Pertinent negatives include no abdominal pain, chest pain, diarrhea, dysuria, ear pain, joint swelling, nausea, neck pain, plugged ear sensation, rash, sneezing, swollen glands, vomiting or wheezing. He has tried acetaminophen and NSAIDs for the symptoms. The treatment provided mild relief.        The following portions of the patient's history were reviewed and updated as appropriate: allergies, current medications, past family history, past medical history, past social history, past surgical history and problem list.    Current Outpatient Prescriptions:   •  azelastine (ASTELIN) 0.1 % nasal spray, 2 sprays into each nostril 2 (Two) Times a Day. Use in each nostril as directed, Disp: 1 each, Rfl: 12  •  brompheniramine-pseudoephedrine-DM 30-2-10 MG/5ML syrup, Take 10 mL by mouth 4 (Four) Times a Day As Needed for Congestion or Cough., Disp: 118 mL, Rfl: 0  •  clonazePAM (KlonoPIN) 0.5 MG tablet, 1 qd prn anxiety, Disp: 30 tablet, Rfl: 2  •  omeprazole (priLOSEC) 40 MG capsule, Take 1 capsule by mouth Daily., Disp: 90 capsule, Rfl: 3  •  oseltamivir (TAMIFLU) 75 MG capsule, Take 1 capsule by mouth 2 (Two) Times a Day for 5 days., Disp: 10 capsule, Rfl: 0  •  ranitidine (ZANTAC) 150 MG capsule, Take 1 capsule by mouth 2 (Two) Times a Day., Disp: 180 capsule, Rfl: 1  •  temazepam (RESTORIL) 30 MG capsule, Take 1 capsule by mouth At Night As Needed for Sleep., Disp: 90 capsule, Rfl: 0     Review  "of Systems   HENT: Positive for congestion, rhinorrhea, sinus pain and sore throat. Negative for ear pain and sneezing.    Respiratory: Positive for cough. Negative for chest tightness and wheezing.    Cardiovascular: Negative for chest pain.   Gastrointestinal: Negative for abdominal pain, diarrhea, nausea and vomiting.   Endocrine: Negative for cold intolerance and heat intolerance.   Genitourinary: Negative for dysuria.   Musculoskeletal: Positive for joint pain. Negative for neck pain.   Skin: Negative for rash.   Neurological: Positive for headaches. Negative for dizziness.       Objective   Physical Exam   Constitutional: He is oriented to person, place, and time. He appears well-developed and well-nourished.   HENT:   Head: Normocephalic and atraumatic.   Eyes: Conjunctivae and EOM are normal. Pupils are equal, round, and reactive to light.   Neck: Normal range of motion.   Cardiovascular: Normal rate, regular rhythm and normal heart sounds.    Pulmonary/Chest: Effort normal and breath sounds normal.   Abdominal: Soft. Bowel sounds are normal.   Musculoskeletal: Normal range of motion.   Neurological: He is alert and oriented to person, place, and time. He has normal reflexes.   Skin: Skin is warm and dry.   Psychiatric: He has a normal mood and affect. His behavior is normal. Judgment and thought content normal.     Vitals:    03/06/18 1339   BP: 108/62   Pulse: 88   Resp: 16   Temp: 98.5 °F (36.9 °C)   TempSrc: Temporal Artery    SpO2: 99%   Weight: 94.1 kg (207 lb 6.4 oz)   Height: 189 cm (74.41\")         Assessment/Plan   Tyler was seen today for cough/chest congestion, nasal congestion, sneezing, sore throat, generalized body aches and fatigue.    Diagnoses and all orders for this visit:    Influenza B  -     POC Influenza A / B  -     oseltamivir (TAMIFLU) 75 MG capsule; Take 1 capsule by mouth 2 (Two) Times a Day for 5 days.  -     brompheniramine-pseudoephedrine-DM 30-2-10 MG/5ML syrup; Take 10 mL by " mouth 4 (Four) Times a Day As Needed for Congestion or Cough.      Rapid flu + for influenza B  Meds as directed  Increase fluids  Tylenol and Ibuprofen PRN  No work until Monday, 3/12/18  RTC if sx's worsen or do not improve

## 2018-03-16 ENCOUNTER — OFFICE VISIT (OUTPATIENT)
Dept: INTERNAL MEDICINE | Facility: CLINIC | Age: 58
End: 2018-03-16

## 2018-03-16 VITALS
BODY MASS INDEX: 26.51 KG/M2 | DIASTOLIC BLOOD PRESSURE: 74 MMHG | WEIGHT: 206.6 LBS | SYSTOLIC BLOOD PRESSURE: 112 MMHG | TEMPERATURE: 97.3 F | HEIGHT: 74 IN

## 2018-03-16 DIAGNOSIS — Z79.899 HIGH RISK MEDICATIONS (NOT ANTICOAGULANTS) LONG-TERM USE: ICD-10-CM

## 2018-03-16 DIAGNOSIS — F41.9 ANXIETY: ICD-10-CM

## 2018-03-16 DIAGNOSIS — F51.01 PRIMARY INSOMNIA: Primary | ICD-10-CM

## 2018-03-16 PROCEDURE — 99214 OFFICE O/P EST MOD 30 MIN: CPT | Performed by: INTERNAL MEDICINE

## 2018-03-16 PROCEDURE — 80306 DRUG TEST PRSMV INSTRMNT: CPT | Performed by: INTERNAL MEDICINE

## 2018-03-16 RX ORDER — TEMAZEPAM 30 MG/1
30 CAPSULE ORAL NIGHTLY PRN
Qty: 90 CAPSULE | Refills: 0 | Status: SHIPPED | OUTPATIENT
Start: 2018-03-16 | End: 2018-06-15 | Stop reason: SDUPTHER

## 2018-03-16 NOTE — PROGRESS NOTES
"Here for f/u on insomnia and anxiety     He was here last week for sick visit, and was positive for flu B. Still fatigued but better    Insomnia - he is using temazepam at night and works well. Sleeping well overall.      Anxiety - uses klonopin very rarely. Last filled in 11/17 and hasn't needed much. He is taking 1/3-1/2 of the wellbutrin. He doesn't like how he feels on a whole tablet, but the smaller dose seems ok       The following portions of the patient's history were reviewed and updated as appropriate: allergies, current medications, past family history, past medical history, past social history, past surgical history and problem list.     Review of Systems   Constitutional: Positive for unexpected weight change (gained a few pounds). Negative for activity change and appetite change.   Neurological: Positive for dizziness (w/ wellbutrin).   GI - omeprazole regualrly for gerd; chronic constipation stable  Psychiatric/Behavioral: Positive for sleep disturbance. Negative for dysphoric mood. The patient is not nervous/anxious (just occasionally).          Objective   Vitals:    03/16/18 1603   BP: 112/74   BP Location: Right arm   Temp: 97.3 °F (36.3 °C)   TempSrc: Temporal Artery    Weight: 93.7 kg (206 lb 9.6 oz)   Height: 189 cm (74.4\")     Physical Exam   Constitutional: He is oriented to person, place, and time. He appears well-developed and well-nourished. No distress.   HENT:   Head: Normocephalic and atraumatic.   Eyes: Conjunctivae and EOM are normal.   Cardiovascular: Normal rate, regular rhythm and normal heart sounds.  Exam reveals no gallop and no friction rub.    No murmur heard.  Pulmonary/Chest: Effort normal and breath sounds normal. No respiratory distress. He has no wheezes.   Neurological: He is alert and oriented to person, place, and time.   Skin: Skin is warm and dry. He is not diaphoretic.   Psychiatric: He has a normal mood and affect. His behavior is normal. Judgment and thought " content normal.         Assessment/Plan   Tyler was seen today for med refill.     Diagnoses and all orders for this visit:     Primary insomnia  -     temazepam (RESTORIL) 30 MG capsule; Take 1 capsule by mouth At Night As Needed for Sleep.     #90/NR - given to pt to take to [facility]  Pt has already signed controlled substance contract. Hema done today and appropriate. UDS due today     Anxiety/depression - uses klonopin rarely. He is using wellbutrin xl and breaking tablet/ we discussed that the XLs shouldn't be cut, be he is tolerating and doing ok. We could switch to the IR w/ next refill

## 2018-03-27 RX ORDER — AZELASTINE 1 MG/ML
SPRAY, METERED NASAL
Qty: 3 EACH | Refills: 3 | Status: SHIPPED | OUTPATIENT
Start: 2018-03-27 | End: 2019-12-16 | Stop reason: SDUPTHER

## 2018-03-27 RX ORDER — OMEPRAZOLE 40 MG/1
40 CAPSULE, DELAYED RELEASE ORAL DAILY
Qty: 90 CAPSULE | Refills: 3 | Status: SHIPPED | OUTPATIENT
Start: 2018-03-27 | End: 2018-09-28

## 2018-03-30 ENCOUNTER — CONSULT (OUTPATIENT)
Dept: SLEEP MEDICINE | Facility: HOSPITAL | Age: 58
End: 2018-03-30

## 2018-03-30 VITALS
DIASTOLIC BLOOD PRESSURE: 80 MMHG | SYSTOLIC BLOOD PRESSURE: 128 MMHG | OXYGEN SATURATION: 98 % | HEART RATE: 79 BPM | WEIGHT: 210 LBS | HEIGHT: 74 IN | BODY MASS INDEX: 26.95 KG/M2

## 2018-03-30 DIAGNOSIS — F51.01 PRIMARY INSOMNIA: ICD-10-CM

## 2018-03-30 DIAGNOSIS — G47.33 OBSTRUCTIVE SLEEP APNEA, ADULT: ICD-10-CM

## 2018-03-30 DIAGNOSIS — R06.83 SNORING: Primary | ICD-10-CM

## 2018-03-30 PROBLEM — E66.3 OVERWEIGHT: Status: ACTIVE | Noted: 2018-03-30

## 2018-03-30 PROCEDURE — 99203 OFFICE O/P NEW LOW 30 MIN: CPT | Performed by: INTERNAL MEDICINE

## 2018-03-30 RX ORDER — BUPROPION HYDROCHLORIDE 150 MG/1
150 TABLET ORAL DAILY
COMMUNITY
End: 2018-06-15

## 2018-04-01 NOTE — PROGRESS NOTES
Subjective   Tyler Doss is a 57 y.o. male is being seen for consultation today at the request of Ya Shafer MD for the evaluation of snoring and nonrestorative sleep and difficulty sleeping.    History of Present Illness  Patient states she's been told that he snores a lot also complains of feeling tired all the time.  He often awakens with this mouth dry and the morning and has a sore throat.  He's had snoring noted for at least 30 years.  He denies being told he has apneas.  He admits he has gained 10 pounds in the past year.  He denies awakening gasping for breath but says he's often groggy in the morning has a morning headache 2 days per week.  He denies any problems falling asleep during the day or while driving.  He is sleepy even if he increases his time in bed.    He has history of loud snoring and snores in all positions he is awakened with a dry mouth and gasping has awakened with sore throat has trouble breathing through his nose both day and night and thinks he may have broken his nose as a child.  He has occasional reflux and is on medications.  He denies hypnagogic hallucinations sleep paralysis.  He denies kicking or jerking his legs at night.  He denies chronic pain.      He goes to bed at 10 PM and will fall asleep in 20 minutes.  He awakens twice during the night he thinks he gets about 8 hours of sleep but still feels tired and groggy.  He takes Restoril to help him get to sleep and has done so for the past 4-5 years.  He denies any history of hypertension diabetes or coronary artery disease.  Allergies   Allergen Reactions   • Atorvastatin Myalgia   • Pravastatin Myalgia   • Penicillins Rash          Current Outpatient Prescriptions:   •  azelastine (ASTELIN) 0.1 % nasal spray, 2 sprays each mostril bid, Disp: 3 each, Rfl: 3  •  buPROPion XL (WELLBUTRIN XL) 150 MG 24 hr tablet, Take 150 mg by mouth Daily., Disp: , Rfl:   •  omeprazole (priLOSEC) 40 MG capsule, Take 1 capsule by mouth  Daily., Disp: 90 capsule, Rfl: 3  •  temazepam (RESTORIL) 30 MG capsule, Take 1 capsule by mouth At Night As Needed for Sleep., Disp: 90 capsule, Rfl: 0  •  clonazePAM (KlonoPIN) 0.5 MG tablet, 1 qd prn anxiety, Disp: 30 tablet, Rfl: 2    Smoking status: Never Smoker                                                              Smokeless tobacco: Never Used                           History   Alcohol Use   • 1.8 oz/week   • 3 Standard drinks or equivalent per week     Comment: weekly       Caffeine: He has 4-6 cups of coffee each day    Past Medical History:   Diagnosis Date   • Anxiety    • Constipation    • Depression    • Eye exam, routine 2011   • GERD (gastroesophageal reflux disease)    • H/O cardiovascular stress test 10/2015    neg   • H/O colonoscopy 08/2015     dilated and slightly tortuos colon   • H/O echocardiogram 10/2015    neg   • H/O x-ray of lumbar spine 02/2015    hypertrophic facet changes l3-s1, significant    • History of dental examination 06/2012   • Prostatitis    • Screening PSA (prostate specific antigen) 11/2015    0.3   • Seasonal allergies        Past Surgical History:   Procedure Laterality Date   • CATARACT EXTRACTION Right 11/29/2016   • ENDOSCOPY  09/2014    neg   Had a procedure for retinal attachment    Family History   Problem Relation Age of Onset   • Ovarian cancer Mother 59     thyroid/goiter   • Thyroid disease Mother      goiter   • Heart attack Father 66     chrohn's disease   • Crohn's disease Father    • Diabetes Sister      CVA, hyperlipidemia   • Diabetes type II Sister    • Hypothyroidism Sister    • Stroke Sister 51   • Hyperlipidemia Sister    • Thyroid disease Brother      partial thyroidectomy benign   • Hyperlipidemia Brother    • Uterine cancer Paternal Grandmother 40   • Thyroid disease Paternal Grandmother      Mass   • Stroke Paternal Grandfather      before 60   • Heart attack Paternal Uncle      in 70's   • Heart disease Other    • Depression Other    •  "Prostate cancer Other    • Ovarian cancer Other        The following portions of the patient's history were reviewed and updated as appropriate: allergies, current medications, past family history, past medical history, past social history, past surgical history and problem list.    Review of Systems   Constitutional: Positive for fever and unexpected weight change.   HENT: Positive for hearing loss, postnasal drip, sinus pressure and tinnitus.    Eyes: Negative.    Respiratory: Negative.    Cardiovascular: Negative.    Gastrointestinal: Positive for constipation.   Endocrine: Negative.    Genitourinary: Positive for frequency.   Musculoskeletal: Negative.    Skin: Negative.    Allergic/Immunologic: Positive for environmental allergies.   Neurological: Positive for dizziness and light-headedness.   Hematological: Negative.    Psychiatric/Behavioral: The patient is nervous/anxious.     Copen scores 3/24    Objective     /80   Pulse 79   Ht 189 cm (74.41\")   Wt 95.3 kg (210 lb)   SpO2 98%   BMI 26.67 kg/m²      Physical Exam   Constitutional: He is oriented to person, place, and time. He appears well-developed and well-nourished.   HENT:   Head: Normocephalic and atraumatic.   His Mallampati class II anatomy.   Eyes: EOM are normal. Pupils are equal, round, and reactive to light.   Neck: Normal range of motion. Neck supple.   Cardiovascular: Normal rate, regular rhythm and normal heart sounds.    Pulmonary/Chest: Effort normal and breath sounds normal.   Abdominal: Soft. Bowel sounds are normal.   Musculoskeletal: Normal range of motion. He exhibits no edema.   Neurological: He is alert and oriented to person, place, and time.   Skin: Skin is warm and dry.   Psychiatric: He has a normal mood and affect. His behavior is normal.         Assessment/Plan   Tyler was seen today for sleeping problem.    Diagnoses and all orders for this visit:    Snoring  -     Home Sleep Study; Future    Obstructive sleep " apnea, adult  -     Home Sleep Study; Future    Primary insomnia     patient has a history of snoring and nonrestorative sleep.  He gets a fairly good story for obstructive sleep apnea and we will plan to proceed to home sleep testing.  I've discussed possible therapies including CPAP, weight control, oral appliances, and surgery.  We've also discussed possible complications of long-term untreated obstructive sleep apnea.  He also has some problems with seen with insomnia and is been on medication for several years.  We will consider addressing this further if we see if he has sleep disordered breathing.         Matt Long MD [facility]  Sleep Medicine  Pulmonary and Critical Care Medicine

## 2018-04-06 ENCOUNTER — HOSPITAL ENCOUNTER (OUTPATIENT)
Dept: SLEEP MEDICINE | Facility: HOSPITAL | Age: 58
End: 2018-04-06
Attending: INTERNAL MEDICINE

## 2018-04-06 VITALS
HEIGHT: 75 IN | HEART RATE: 98 BPM | SYSTOLIC BLOOD PRESSURE: 132 MMHG | RESPIRATION RATE: 16 BRPM | WEIGHT: 210 LBS | DIASTOLIC BLOOD PRESSURE: 80 MMHG | OXYGEN SATURATION: 96 % | BODY MASS INDEX: 26.11 KG/M2

## 2018-04-06 DIAGNOSIS — R06.83 SNORING: ICD-10-CM

## 2018-04-06 DIAGNOSIS — G47.33 OBSTRUCTIVE SLEEP APNEA, ADULT: ICD-10-CM

## 2018-04-06 PROCEDURE — 95806 SLEEP STUDY UNATT&RESP EFFT: CPT

## 2018-04-11 DIAGNOSIS — R29.818 SUSPECTED SLEEP APNEA: Primary | ICD-10-CM

## 2018-04-27 ENCOUNTER — HOSPITAL ENCOUNTER (OUTPATIENT)
Dept: SLEEP MEDICINE | Facility: HOSPITAL | Age: 58
Discharge: HOME | End: 2018-04-27
Admitting: INTERNAL MEDICINE

## 2018-04-27 VITALS
BODY MASS INDEX: 26.95 KG/M2 | WEIGHT: 210 LBS | HEART RATE: 89 BPM | SYSTOLIC BLOOD PRESSURE: 140 MMHG | HEIGHT: 74 IN | DIASTOLIC BLOOD PRESSURE: 88 MMHG

## 2018-04-27 DIAGNOSIS — R29.818 SUSPECTED SLEEP APNEA: ICD-10-CM

## 2018-04-27 PROCEDURE — 95806 SLEEP STUDY UNATT&RESP EFFT: CPT | Performed by: INTERNAL MEDICINE

## 2018-04-27 PROCEDURE — 95806 SLEEP STUDY UNATT&RESP EFFT: CPT

## 2018-05-02 DIAGNOSIS — G47.33 OBSTRUCTIVE SLEEP APNEA, ADULT: Primary | ICD-10-CM

## 2018-05-04 NOTE — PROGRESS NOTES
Patient would like to be set up on pap therapy but did not have a preference of a DME. He will be using [facility] due to rotation 5/4/2018

## 2018-06-14 NOTE — PROGRESS NOTES
History of Present Illness   Here for f/u on:    Insomnia - he is using temazepam at night and works well. Sleeping well overall. He did have a repeat sleep study and was just mildly abnormal so he decided not to do CPAP. He is trying to lose weight     Anxiety - uses klonopin very rarely. Has uses occasionally for leg cramps as well, usually just 1/2 at a time.  Last filled in 11/17 and hasn't needed much. He had some old wellbutrin 75 1 qd and is tolerating this well, so would like Rx for it    The following portions of the patient's history were reviewed and updated as appropriate: allergies, current medications, past family history, past medical history, past social history, past surgical history and problem list.    Review of Systems   Constitutional: Negative for fatigue and fever. wt stable  Respiratory: Negative for shortness of breath.    Cardiovascular: Negative for chest pain.    GI - omeprazole regualrly for gerd; chronic constipation stable  Psychiatric/Behavioral: Positive for sleep disturbance. Negative for dysphoric mood. The patient is not nervous/anxious (just occasionally).   Neuro: no dizziness        Objective    Vitals:    06/15/18 1602   BP: 110/72   Pulse: 68   Temp: 98 °F (36.7 °C)   SpO2: 98%     Physical Exam   Constitutional: oriented to person, place, and time. well-developed and well-nourished. No distress.   HENT:   Head: Normocephalic and atraumatic.   Eyes: Conjunctivae and EOM are normal.   Cardiovascular: Normal rate, regular rhythm and normal heart sounds.  Exam reveals no gallop and no friction rub.    No murmur heard.  Pulmonary/Chest: Effort normal and breath sounds normal. No respiratory distress.  no wheezes.   Abd: soft, NTND  Neurological:  alert and oriented to person, place, and time.   Skin: Skin is warm and dry. not diaphoretic.   Psychiatric: normal mood and affect. Behavior and judgement normal    Assessment/Plan   Tyler was seen today for 3 month follow up and med  refill.    Diagnoses and all orders for this visit:    Primary insomnia  -     temazepam (RESTORIL) 30 MG capsule; Take 1 capsule by mouth At Night As Needed for Sleep.    Anxiety  -     clonazePAM (KlonoPIN) 0.5 MG tablet; 1 qd prn anxiety    Other depression  -     buPROPion (WELLBUTRIN) 75 MG tablet; 1qd      For the klonopin and temazapam, Pt has already signed controlled substance contract. Hema done today and appropriate. UDS due 3/19    Preventative - will have him schedule physical at next appt

## 2018-06-15 ENCOUNTER — OFFICE VISIT (OUTPATIENT)
Dept: INTERNAL MEDICINE | Facility: CLINIC | Age: 58
End: 2018-06-15

## 2018-06-15 VITALS
OXYGEN SATURATION: 98 % | HEART RATE: 68 BPM | TEMPERATURE: 98 F | BODY MASS INDEX: 26.98 KG/M2 | DIASTOLIC BLOOD PRESSURE: 72 MMHG | HEIGHT: 74 IN | SYSTOLIC BLOOD PRESSURE: 110 MMHG | WEIGHT: 210.2 LBS

## 2018-06-15 DIAGNOSIS — F41.9 ANXIETY: ICD-10-CM

## 2018-06-15 DIAGNOSIS — F51.01 PRIMARY INSOMNIA: Primary | ICD-10-CM

## 2018-06-15 DIAGNOSIS — F32.89 OTHER DEPRESSION: ICD-10-CM

## 2018-06-15 PROCEDURE — 99214 OFFICE O/P EST MOD 30 MIN: CPT | Performed by: INTERNAL MEDICINE

## 2018-06-15 RX ORDER — TEMAZEPAM 30 MG/1
30 CAPSULE ORAL NIGHTLY PRN
Qty: 90 CAPSULE | Refills: 0 | Status: SHIPPED | OUTPATIENT
Start: 2018-06-15 | End: 2018-09-18 | Stop reason: SDUPTHER

## 2018-06-15 RX ORDER — BUPROPION HYDROCHLORIDE 75 MG/1
TABLET ORAL
Qty: 90 TABLET | Refills: 3 | Status: SHIPPED | OUTPATIENT
Start: 2018-06-15 | End: 2019-05-02

## 2018-06-15 RX ORDER — CLONAZEPAM 0.5 MG/1
TABLET ORAL
Qty: 30 TABLET | Refills: 2 | Status: SHIPPED | OUTPATIENT
Start: 2018-06-15 | End: 2018-09-28 | Stop reason: SDUPTHER

## 2018-09-13 ENCOUNTER — TELEPHONE (OUTPATIENT)
Dept: INTERNAL MEDICINE | Facility: CLINIC | Age: 58
End: 2018-09-13

## 2018-09-13 NOTE — TELEPHONE ENCOUNTER
Mr. TempletonDoss called stating that his Tomazapam will run out on 9/21/2018, his physical lis 9/27/2018, he would like to know if Dr. Shafer can refill before his 9/27/2018 appt.   Please Advise.

## 2018-09-18 DIAGNOSIS — F51.01 PRIMARY INSOMNIA: ICD-10-CM

## 2018-09-18 RX ORDER — TEMAZEPAM 30 MG/1
30 CAPSULE ORAL NIGHTLY PRN
Qty: 7 CAPSULE | Refills: 0 | Status: SHIPPED | OUTPATIENT
Start: 2018-09-18 | End: 2018-09-28 | Stop reason: SDUPTHER

## 2018-09-25 ENCOUNTER — TELEPHONE (OUTPATIENT)
Dept: INTERNAL MEDICINE | Facility: CLINIC | Age: 58
End: 2018-09-25

## 2018-09-28 ENCOUNTER — OFFICE VISIT (OUTPATIENT)
Dept: INTERNAL MEDICINE | Facility: CLINIC | Age: 58
End: 2018-09-28

## 2018-09-28 VITALS
DIASTOLIC BLOOD PRESSURE: 76 MMHG | HEIGHT: 75 IN | TEMPERATURE: 97.7 F | OXYGEN SATURATION: 99 % | SYSTOLIC BLOOD PRESSURE: 112 MMHG | WEIGHT: 212.6 LBS | HEART RATE: 64 BPM | BODY MASS INDEX: 26.43 KG/M2

## 2018-09-28 DIAGNOSIS — Z00.00 ROUTINE GENERAL MEDICAL EXAMINATION AT A HEALTH CARE FACILITY: Primary | ICD-10-CM

## 2018-09-28 DIAGNOSIS — F41.9 ANXIETY: ICD-10-CM

## 2018-09-28 DIAGNOSIS — Z23 NEED FOR HEPATITIS A IMMUNIZATION: ICD-10-CM

## 2018-09-28 DIAGNOSIS — F51.01 PRIMARY INSOMNIA: ICD-10-CM

## 2018-09-28 DIAGNOSIS — K21.9 GASTROESOPHAGEAL REFLUX DISEASE, ESOPHAGITIS PRESENCE NOT SPECIFIED: ICD-10-CM

## 2018-09-28 LAB
ALBUMIN SERPL-MCNC: 4.6 G/DL (ref 3.2–4.8)
ALBUMIN/GLOB SERPL: 2.6 G/DL (ref 1.5–2.5)
ALP SERPL-CCNC: 51 U/L (ref 25–100)
ALT SERPL W P-5'-P-CCNC: 37 U/L (ref 7–40)
ANION GAP SERPL CALCULATED.3IONS-SCNC: 12 MMOL/L (ref 3–11)
ARTICHOKE IGE QN: 122 MG/DL (ref 0–130)
AST SERPL-CCNC: 24 U/L (ref 0–33)
BASOPHILS # BLD AUTO: 0.02 10*3/MM3 (ref 0–0.2)
BASOPHILS NFR BLD AUTO: 0.3 % (ref 0–1)
BILIRUB BLD-MCNC: NEGATIVE MG/DL
BILIRUB SERPL-MCNC: 0.7 MG/DL (ref 0.3–1.2)
BUN BLD-MCNC: 14 MG/DL (ref 9–23)
BUN/CREAT SERPL: 15.4 (ref 7–25)
CALCIUM SPEC-SCNC: 9.4 MG/DL (ref 8.7–10.4)
CHLORIDE SERPL-SCNC: 97 MMOL/L (ref 99–109)
CHOLEST SERPL-MCNC: 181 MG/DL (ref 0–200)
CLARITY, POC: CLEAR
CO2 SERPL-SCNC: 29 MMOL/L (ref 20–31)
COLOR UR: YELLOW
CREAT BLD-MCNC: 0.91 MG/DL (ref 0.6–1.3)
DEPRECATED RDW RBC AUTO: 46.6 FL (ref 37–54)
EOSINOPHIL # BLD AUTO: 0.17 10*3/MM3 (ref 0–0.3)
EOSINOPHIL NFR BLD AUTO: 2.5 % (ref 0–3)
ERYTHROCYTE [DISTWIDTH] IN BLOOD BY AUTOMATED COUNT: 13.9 % (ref 11.3–14.5)
GFR SERPL CREATININE-BSD FRML MDRD: 85 ML/MIN/1.73
GLOBULIN UR ELPH-MCNC: 1.8 GM/DL
GLUCOSE BLD-MCNC: 87 MG/DL (ref 70–100)
GLUCOSE UR STRIP-MCNC: NEGATIVE MG/DL
HBA1C MFR BLD: 5.9 % (ref 4.8–5.6)
HCT VFR BLD AUTO: 45.7 % (ref 38.9–50.9)
HDLC SERPL-MCNC: 39 MG/DL (ref 40–60)
HGB BLD-MCNC: 14.8 G/DL (ref 13.1–17.5)
IMM GRANULOCYTES # BLD: 0.02 10*3/MM3 (ref 0–0.03)
IMM GRANULOCYTES NFR BLD: 0.3 % (ref 0–0.6)
KETONES UR QL: NEGATIVE
LEUKOCYTE EST, POC: NEGATIVE
LYMPHOCYTES # BLD AUTO: 1.85 10*3/MM3 (ref 0.6–4.8)
LYMPHOCYTES NFR BLD AUTO: 27.4 % (ref 24–44)
MCH RBC QN AUTO: 29.7 PG (ref 27–31)
MCHC RBC AUTO-ENTMCNC: 32.4 G/DL (ref 32–36)
MCV RBC AUTO: 91.8 FL (ref 80–99)
MONOCYTES # BLD AUTO: 0.77 10*3/MM3 (ref 0–1)
MONOCYTES NFR BLD AUTO: 11.4 % (ref 0–12)
NEUTROPHILS # BLD AUTO: 3.94 10*3/MM3 (ref 1.5–8.3)
NEUTROPHILS NFR BLD AUTO: 58.4 % (ref 41–71)
NITRITE UR-MCNC: NEGATIVE MG/ML
PH UR: 7 [PH] (ref 5–8)
PLATELET # BLD AUTO: 295 10*3/MM3 (ref 150–450)
PMV BLD AUTO: 11.5 FL (ref 6–12)
POTASSIUM BLD-SCNC: 4.6 MMOL/L (ref 3.5–5.5)
PROT SERPL-MCNC: 6.4 G/DL (ref 5.7–8.2)
PROT UR STRIP-MCNC: NEGATIVE MG/DL
PSA SERPL-MCNC: 0.26 NG/ML (ref 0–4)
RBC # BLD AUTO: 4.98 10*6/MM3 (ref 4.2–5.76)
RBC # UR STRIP: NEGATIVE /UL
SODIUM BLD-SCNC: 138 MMOL/L (ref 132–146)
SP GR UR: 1.01 (ref 1–1.03)
TRIGL SERPL-MCNC: 178 MG/DL (ref 0–150)
TSH SERPL DL<=0.05 MIU/L-ACNC: 2.06 MIU/ML (ref 0.35–5.35)
UROBILINOGEN UR QL: NORMAL
WBC NRBC COR # BLD: 6.75 10*3/MM3 (ref 3.5–10.8)

## 2018-09-28 PROCEDURE — G0103 PSA SCREENING: HCPCS | Performed by: INTERNAL MEDICINE

## 2018-09-28 PROCEDURE — 90471 IMMUNIZATION ADMIN: CPT | Performed by: INTERNAL MEDICINE

## 2018-09-28 PROCEDURE — 90632 HEPA VACCINE ADULT IM: CPT | Performed by: INTERNAL MEDICINE

## 2018-09-28 PROCEDURE — 80053 COMPREHEN METABOLIC PANEL: CPT | Performed by: INTERNAL MEDICINE

## 2018-09-28 PROCEDURE — 80061 LIPID PANEL: CPT | Performed by: INTERNAL MEDICINE

## 2018-09-28 PROCEDURE — 81003 URINALYSIS AUTO W/O SCOPE: CPT | Performed by: INTERNAL MEDICINE

## 2018-09-28 PROCEDURE — 99396 PREV VISIT EST AGE 40-64: CPT | Performed by: INTERNAL MEDICINE

## 2018-09-28 PROCEDURE — 85025 COMPLETE CBC W/AUTO DIFF WBC: CPT | Performed by: INTERNAL MEDICINE

## 2018-09-28 PROCEDURE — 83036 HEMOGLOBIN GLYCOSYLATED A1C: CPT | Performed by: INTERNAL MEDICINE

## 2018-09-28 PROCEDURE — 84443 ASSAY THYROID STIM HORMONE: CPT | Performed by: INTERNAL MEDICINE

## 2018-09-28 RX ORDER — TEMAZEPAM 30 MG/1
30 CAPSULE ORAL NIGHTLY PRN
Qty: 90 CAPSULE | Refills: 0 | Status: SHIPPED | OUTPATIENT
Start: 2018-09-28 | End: 2018-12-21 | Stop reason: SDUPTHER

## 2018-09-28 RX ORDER — CLONAZEPAM 0.5 MG/1
TABLET ORAL
Qty: 90 TABLET | Refills: 0 | Status: SHIPPED | OUTPATIENT
Start: 2018-09-28 | End: 2020-04-06 | Stop reason: SDUPTHER

## 2018-09-28 RX ORDER — FAMOTIDINE 40 MG/1
40 TABLET, FILM COATED ORAL DAILY
Qty: 30 TABLET | Refills: 5 | Status: SHIPPED | OUTPATIENT
Start: 2018-09-28 | End: 2018-12-21

## 2018-12-21 ENCOUNTER — OFFICE VISIT (OUTPATIENT)
Dept: INTERNAL MEDICINE | Facility: CLINIC | Age: 58
End: 2018-12-21

## 2018-12-21 VITALS
HEIGHT: 75 IN | SYSTOLIC BLOOD PRESSURE: 110 MMHG | WEIGHT: 211.6 LBS | BODY MASS INDEX: 26.31 KG/M2 | TEMPERATURE: 97.6 F | DIASTOLIC BLOOD PRESSURE: 72 MMHG

## 2018-12-21 DIAGNOSIS — F51.01 PRIMARY INSOMNIA: ICD-10-CM

## 2018-12-21 PROCEDURE — 99213 OFFICE O/P EST LOW 20 MIN: CPT | Performed by: INTERNAL MEDICINE

## 2018-12-21 RX ORDER — TEMAZEPAM 30 MG/1
30 CAPSULE ORAL NIGHTLY PRN
Qty: 90 CAPSULE | Refills: 0 | Status: SHIPPED | OUTPATIENT
Start: 2018-12-21 | End: 2019-03-20 | Stop reason: SDUPTHER

## 2018-12-21 RX ORDER — OMEPRAZOLE 40 MG/1
40 CAPSULE, DELAYED RELEASE ORAL DAILY
COMMUNITY
End: 2019-06-05 | Stop reason: SDUPTHER

## 2018-12-21 NOTE — PROGRESS NOTES
Subjective   Tyler Doss is a 58 y.o. male.     History of Present Illness   here for f/u on:  Insomnia - he is using temazepam at night and works well. Sleeping well overall.     Anxiety - uses klonopin very rarely. does make him feel hungover so has not used much.  He has been taking wellbutrin 75mg 1/2 bid has helped w/ mood and w/ the ADHD.     GERD - did try pepcid but did not work, so is back on prilosec    The following portions of the patient's history were reviewed and updated as appropriate: allergies, current medications, past family history, past medical history, past social history, past surgical history and problem list.    Review of Systems   Constitutional: Negative for unexpected weight gain and unexpected weight loss.   Gastrointestinal: Positive for constipation (h/o) and GERD.   Psychiatric/Behavioral: Positive for sleep disturbance and stress. Negative for dysphoric mood and depressed mood. The patient is nervous/anxious.        Objective   Physical Exam   Constitutional: He is oriented to person, place, and time. He appears well-developed and well-nourished. No distress.   HENT:   Head: Normocephalic and atraumatic.   Eyes: EOM are normal. Pupils are equal, round, and reactive to light.   Neck: Normal range of motion. Neck supple.   Cardiovascular: Normal rate and regular rhythm.   Pulmonary/Chest: Effort normal and breath sounds normal.   Musculoskeletal: He exhibits no edema.   Neurological: He is alert and oriented to person, place, and time. No cranial nerve deficit.   Skin: Skin is warm and dry. No rash noted. He is not diaphoretic.   Psychiatric: He has a normal mood and affect. His behavior is normal. Judgment and thought content normal.         Assessment/Plan   Tyler was seen today for insomnia.    Diagnoses and all orders for this visit:    Primary insomnia  -     temazepam (RESTORIL) 30 MG capsule; Take 1 capsule by mouth At Night As Needed for Sleep.    pt stable currently.  Pt  has already signed controlled substance contract. Hema done today and appropriate. UDS due 3/19    Prev -   Flu - had at work  Hep A #2 due 3/19

## 2019-02-04 ENCOUNTER — OFFICE VISIT (OUTPATIENT)
Dept: INTERNAL MEDICINE | Facility: CLINIC | Age: 59
End: 2019-02-04

## 2019-02-04 VITALS
BODY MASS INDEX: 26.43 KG/M2 | DIASTOLIC BLOOD PRESSURE: 66 MMHG | HEIGHT: 75 IN | TEMPERATURE: 98.2 F | WEIGHT: 212.6 LBS | SYSTOLIC BLOOD PRESSURE: 122 MMHG

## 2019-02-04 DIAGNOSIS — J40 BRONCHITIS: Primary | ICD-10-CM

## 2019-02-04 PROCEDURE — 99213 OFFICE O/P EST LOW 20 MIN: CPT | Performed by: INTERNAL MEDICINE

## 2019-02-04 RX ORDER — AZITHROMYCIN 250 MG/1
TABLET, FILM COATED ORAL
Qty: 6 TABLET | Refills: 0 | Status: SHIPPED | OUTPATIENT
Start: 2019-02-04 | End: 2019-03-20

## 2019-02-04 NOTE — PROGRESS NOTES
"Subjective   Tyler Doss is a 58 y.o. male.     History of Present Illness     Here for:    URI symptoms over the last 2 1/2 weeks. Coughing up some dark-colored phlegm. some nasal drainage and sinus pressure as well. No OTCs so far. Felt better for a few days, then symptoms restarted and can't seem to kick it    The following portions of the patient's history were reviewed and updated as appropriate: allergies, current medications, past family history, past medical history, past social history, past surgical history and problem list.    Review of Systems   Constitutional: Positive for appetite change. Negative for fever, unexpected weight gain and unexpected weight loss.   HENT: Positive for congestion and rhinorrhea. Negative for sore throat.    Respiratory: Positive for cough, chest tightness (w/ deep breathes) and shortness of breath.        Objective   /66 (BP Location: Left arm)   Temp 98.2 °F (36.8 °C) (Temporal)   Ht 189.5 cm (74.6\")   Wt 96.4 kg (212 lb 9.6 oz)   BMI 26.86 kg/m²   Physical Exam   Constitutional: He is oriented to person, place, and time. He appears well-developed and well-nourished. No distress.   HENT:   Head: Normocephalic and atraumatic.   Right Ear: External ear normal.   Left Ear: External ear normal.   Mouth/Throat: No oropharyngeal exudate.   Op slightly erythematous but no exudate or drainage. Sinuses NT   Eyes: Conjunctivae and EOM are normal. Pupils are equal, round, and reactive to light. Right eye exhibits no discharge. Left eye exhibits no discharge.   Neck: Normal range of motion. Neck supple.   Cardiovascular: Normal rate and regular rhythm.   Pulmonary/Chest: Effort normal and breath sounds normal. No stridor. No respiratory distress. He has no wheezes. He has no rales.   Musculoskeletal: He exhibits no edema.   Lymphadenopathy:     He has no cervical adenopathy.   Neurological: He is alert and oriented to person, place, and time. No cranial nerve deficit. "   Skin: Skin is warm and dry. No rash noted. He is not diaphoretic.   Psychiatric: He has a normal mood and affect. His behavior is normal. Judgment and thought content normal.   Nursing note and vitals reviewed.        Assessment/Plan   Tyler was seen today for cough, nasal drainage, fatigue and anorexia.    Diagnoses and all orders for this visit:    Bronchitis - Fluids/rest. Call if no better.   -     azithromycin (ZITHROMAX Z-ROXANA) 250 MG tablet; Take 2 tablets the first day, then 1 tablet daily for 4 days.

## 2019-02-05 RX ORDER — OSELTAMIVIR PHOSPHATE 75 MG/1
75 CAPSULE ORAL DAILY
Qty: 10 CAPSULE | Refills: 0 | Status: SHIPPED | OUTPATIENT
Start: 2019-02-05 | End: 2019-03-20

## 2019-03-19 NOTE — PROGRESS NOTES
"Subjective   Tyler Doss is a 58 y.o. male.     History of Present Illness     Here for f/u on:    Insomnia - no problems w/ the temazepam. Needs refill    R hip pain when he sits for awhile over the last 2-3 months, not really when he lays on it or walks. He tried some exercises which made it worse. Uses tylenol and apser cream - help some    He had to stop wellbutrin 75 as he felt dizzy with it. Has been off at least a month. Does not feel he needs a new antidepressant at this point    He'd like to get tested to see if he needs MMR. he remembers having measles as a child    Fatigue - even when he sleeps well, feels tired frequently    The following portions of the patient's history were reviewed and updated as appropriate: allergies, current medications, past family history, past medical history, past social history, past surgical history and problem list.    Review of Systems   Constitutional: Positive for fatigue. Negative for fever, unexpected weight gain and unexpected weight loss.   Respiratory: Negative for shortness of breath.    Cardiovascular: Negative for chest pain.   Musculoskeletal: Positive for arthralgias.   Skin: Negative.    Allergic/Immunologic: Negative for immunocompromised state.   Neurological: Positive for dizziness (resolved after going off wellbutrin). Negative for seizures, memory problem and confusion.   Psychiatric/Behavioral: Positive for sleep disturbance. Negative for agitation, dysphoric mood and depressed mood.       Objective   /74 (BP Location: Left arm)   Temp 98.1 °F (36.7 °C) (Temporal)   Ht 189.5 cm (74.6\")   Wt 98.6 kg (217 lb 6.4 oz)   BMI 27.47 kg/m²   Physical Exam   Constitutional: He is oriented to person, place, and time. He appears well-developed and well-nourished. No distress.   HENT:   Head: Normocephalic and atraumatic.   Eyes: Conjunctivae and EOM are normal. Pupils are equal, round, and reactive to light. Right eye exhibits no discharge. Left eye " exhibits no discharge.   Neck: Normal range of motion. Neck supple.   Cardiovascular: Normal rate and regular rhythm.   Pulmonary/Chest: Effort normal and breath sounds normal.   Musculoskeletal: He exhibits tenderness (R lateral hip below joint). He exhibits no edema.   Neg SLR, slight pain w/ R int rotation   Neurological: He is alert and oriented to person, place, and time. No cranial nerve deficit.   Skin: Skin is warm and dry. No rash noted. He is not diaphoretic.   Psychiatric: He has a normal mood and affect. His behavior is normal. Judgment and thought content normal.   Nursing note and vitals reviewed.        Assessment/Plan   Tyler was seen today for insomnia.    Diagnoses and all orders for this visit:    Primary insomnia - Pt has already signed controlled substance contract. Hema done today and appropriate. We will do UDS next visit  -     temazepam (RESTORIL) 30 MG capsule; Take 1 capsule by mouth At Night As Needed for Sleep.    Need for hepatitis A immunization  -     Hepatitis A Vaccine Adult IM    Immunity to measles, mumps, and rubella determined by serologic test  -     Measles / Mumps / Rubella Immunity    Other fatigue  -     TSH    High risk medications (not anticoagulants) long-term use  -     Comprehensive Metabolic Panel    Right hip pain  Comments:  ? bursitis - we discussed xr, ortho referral but he declines at this point

## 2019-03-20 ENCOUNTER — OFFICE VISIT (OUTPATIENT)
Dept: INTERNAL MEDICINE | Facility: CLINIC | Age: 59
End: 2019-03-20

## 2019-03-20 VITALS
DIASTOLIC BLOOD PRESSURE: 74 MMHG | HEIGHT: 75 IN | BODY MASS INDEX: 27.03 KG/M2 | SYSTOLIC BLOOD PRESSURE: 108 MMHG | WEIGHT: 217.4 LBS | TEMPERATURE: 98.1 F

## 2019-03-20 DIAGNOSIS — Z23 NEED FOR HEPATITIS A IMMUNIZATION: ICD-10-CM

## 2019-03-20 DIAGNOSIS — Z01.84 IMMUNITY TO MEASLES, MUMPS, AND RUBELLA DETERMINED BY SEROLOGIC TEST: ICD-10-CM

## 2019-03-20 DIAGNOSIS — R53.83 OTHER FATIGUE: ICD-10-CM

## 2019-03-20 DIAGNOSIS — Z79.899 HIGH RISK MEDICATIONS (NOT ANTICOAGULANTS) LONG-TERM USE: ICD-10-CM

## 2019-03-20 DIAGNOSIS — F51.01 PRIMARY INSOMNIA: Primary | ICD-10-CM

## 2019-03-20 DIAGNOSIS — M25.551 RIGHT HIP PAIN: ICD-10-CM

## 2019-03-20 LAB
ALBUMIN SERPL-MCNC: 4.61 G/DL (ref 3.2–4.8)
ALBUMIN/GLOB SERPL: 2.6 G/DL (ref 1.5–2.5)
ALP SERPL-CCNC: 55 U/L (ref 25–100)
ALT SERPL W P-5'-P-CCNC: 35 U/L (ref 7–40)
ANION GAP SERPL CALCULATED.3IONS-SCNC: 8 MMOL/L (ref 3–11)
AST SERPL-CCNC: 24 U/L (ref 0–33)
BILIRUB SERPL-MCNC: 0.2 MG/DL (ref 0.3–1.2)
BUN BLD-MCNC: 20 MG/DL (ref 9–23)
BUN/CREAT SERPL: 20.4 (ref 7–25)
CALCIUM SPEC-SCNC: 9.6 MG/DL (ref 8.7–10.4)
CHLORIDE SERPL-SCNC: 104 MMOL/L (ref 99–109)
CO2 SERPL-SCNC: 26 MMOL/L (ref 20–31)
CREAT BLD-MCNC: 0.98 MG/DL (ref 0.6–1.3)
GFR SERPL CREATININE-BSD FRML MDRD: 78 ML/MIN/1.73
GLOBULIN UR ELPH-MCNC: 1.8 GM/DL
GLUCOSE BLD-MCNC: 98 MG/DL (ref 70–100)
POTASSIUM BLD-SCNC: 4.2 MMOL/L (ref 3.5–5.5)
PROT SERPL-MCNC: 6.4 G/DL (ref 5.7–8.2)
SODIUM BLD-SCNC: 138 MMOL/L (ref 132–146)
TSH SERPL DL<=0.05 MIU/L-ACNC: 2.73 MIU/ML (ref 0.35–5.35)

## 2019-03-20 PROCEDURE — 86765 RUBEOLA ANTIBODY: CPT | Performed by: INTERNAL MEDICINE

## 2019-03-20 PROCEDURE — 80053 COMPREHEN METABOLIC PANEL: CPT | Performed by: INTERNAL MEDICINE

## 2019-03-20 PROCEDURE — 90632 HEPA VACCINE ADULT IM: CPT | Performed by: INTERNAL MEDICINE

## 2019-03-20 PROCEDURE — 90471 IMMUNIZATION ADMIN: CPT | Performed by: INTERNAL MEDICINE

## 2019-03-20 PROCEDURE — 84443 ASSAY THYROID STIM HORMONE: CPT | Performed by: INTERNAL MEDICINE

## 2019-03-20 PROCEDURE — 86735 MUMPS ANTIBODY: CPT | Performed by: INTERNAL MEDICINE

## 2019-03-20 PROCEDURE — 99214 OFFICE O/P EST MOD 30 MIN: CPT | Performed by: INTERNAL MEDICINE

## 2019-03-20 PROCEDURE — 86762 RUBELLA ANTIBODY: CPT | Performed by: INTERNAL MEDICINE

## 2019-03-20 RX ORDER — TEMAZEPAM 30 MG/1
30 CAPSULE ORAL NIGHTLY PRN
Qty: 90 CAPSULE | Refills: 0 | Status: SHIPPED | OUTPATIENT
Start: 2019-03-20 | End: 2019-06-18 | Stop reason: SDUPTHER

## 2019-03-22 LAB
MEV IGG SER IA-ACNC: >300 AU/ML
MUV IGG SER IA-ACNC: 274 AU/ML
RUBV IGG SERPL IA-ACNC: >33 INDEX

## 2019-03-25 RX ORDER — OMEPRAZOLE 40 MG/1
CAPSULE, DELAYED RELEASE ORAL
Qty: 90 CAPSULE | Refills: 2 | Status: SHIPPED | OUTPATIENT
Start: 2019-03-25 | End: 2020-02-25 | Stop reason: ALTCHOICE

## 2019-04-24 ENCOUNTER — TELEPHONE (OUTPATIENT)
Dept: INTERNAL MEDICINE | Facility: CLINIC | Age: 59
End: 2019-04-24

## 2019-04-24 DIAGNOSIS — M25.552 BILATERAL HIP PAIN: Primary | ICD-10-CM

## 2019-04-24 DIAGNOSIS — M25.551 BILATERAL HIP PAIN: Primary | ICD-10-CM

## 2019-04-24 NOTE — TELEPHONE ENCOUNTER
Patients wife is requesting an ortho referral.  I let her know we would contact with date/time once apt was scheduled.

## 2019-05-02 ENCOUNTER — OFFICE VISIT (OUTPATIENT)
Dept: ORTHOPEDIC SURGERY | Facility: CLINIC | Age: 59
End: 2019-05-02

## 2019-05-02 VITALS — HEART RATE: 91 BPM | HEIGHT: 75 IN | BODY MASS INDEX: 25.85 KG/M2 | WEIGHT: 207.89 LBS | OXYGEN SATURATION: 98 %

## 2019-05-02 DIAGNOSIS — M70.62 TROCHANTERIC BURSITIS OF BOTH HIPS: ICD-10-CM

## 2019-05-02 DIAGNOSIS — M70.61 TROCHANTERIC BURSITIS OF BOTH HIPS: ICD-10-CM

## 2019-05-02 DIAGNOSIS — M25.552 BILATERAL HIP PAIN: Primary | ICD-10-CM

## 2019-05-02 DIAGNOSIS — M25.551 BILATERAL HIP PAIN: Primary | ICD-10-CM

## 2019-05-02 PROCEDURE — 99244 OFF/OP CNSLTJ NEW/EST MOD 40: CPT | Performed by: ORTHOPAEDIC SURGERY

## 2019-05-02 PROCEDURE — 20610 DRAIN/INJ JOINT/BURSA W/O US: CPT | Performed by: ORTHOPAEDIC SURGERY

## 2019-05-02 RX ORDER — TRIAMCINOLONE ACETONIDE 40 MG/ML
80 INJECTION, SUSPENSION INTRA-ARTICULAR; INTRAMUSCULAR
Status: COMPLETED | OUTPATIENT
Start: 2019-05-02 | End: 2019-05-02

## 2019-05-02 RX ORDER — BUPIVACAINE HYDROCHLORIDE 2.5 MG/ML
3 INJECTION, SOLUTION INFILTRATION; PERINEURAL
Status: COMPLETED | OUTPATIENT
Start: 2019-05-02 | End: 2019-05-02

## 2019-05-02 RX ORDER — LIDOCAINE HYDROCHLORIDE 10 MG/ML
3 INJECTION, SOLUTION EPIDURAL; INFILTRATION; INTRACAUDAL; PERINEURAL
Status: COMPLETED | OUTPATIENT
Start: 2019-05-02 | End: 2019-05-02

## 2019-05-02 RX ADMIN — LIDOCAINE HYDROCHLORIDE 3 ML: 10 INJECTION, SOLUTION EPIDURAL; INFILTRATION; INTRACAUDAL; PERINEURAL at 08:12

## 2019-05-02 RX ADMIN — TRIAMCINOLONE ACETONIDE 80 MG: 40 INJECTION, SUSPENSION INTRA-ARTICULAR; INTRAMUSCULAR at 08:12

## 2019-05-02 RX ADMIN — BUPIVACAINE HYDROCHLORIDE 3 ML: 2.5 INJECTION, SOLUTION INFILTRATION; PERINEURAL at 08:12

## 2019-05-02 NOTE — PROGRESS NOTES
Procedure   Large Joint Arthrocentesis: R greater trochanteric bursa  Date/Time: 5/2/2019 8:12 AM  Consent given by: patient  Site marked: site marked  Timeout: Immediately prior to procedure a time out was called to verify the correct patient, procedure, equipment, support staff and site/side marked as required   Supporting Documentation  Indications: pain   Procedure Details  Location: hip - R greater trochanteric bursa  Preparation: Patient was prepped and draped in the usual sterile fashion  Needle size: 22 G  Approach: lateral  Medications administered: 3 mL lidocaine PF 1% 1 %; 3 mL bupivacaine 0.25 %; 80 mg triamcinolone acetonide 40 MG/ML  Patient tolerance: patient tolerated the procedure well with no immediate complications

## 2019-05-02 NOTE — PROGRESS NOTES
Orthopaedic Clinic Note: Hip New Patient    Chief Complaint   Patient presents with   • Left Hip - Pain   • Right Hip - Pain        HPI  Consult from: Ya Shafer MD    Tyler Doss is a 58 y.o. male who presents with bilateral hip pain, right worse than left for approximately 2 months.  Pain is localized lateral trochanter.  He states that 80% of his pain is on the right side.  He is now starting to experience some minor left hip pain as well.  He denies any pain in the groin.  He rates the pain a 3/10 on the pain scale.  His pain is worse with sitting, climbing stairs.  He is especially having difficulty sleeping and lying on the affected side.  He is ambulating with no assistive device.  Previous treatments have included oral anti-inflammatories with minimal improvement.  He is here today to discuss treatment options for his ongoing hip pain.       Past Medical History:   Diagnosis Date   • Anxiety    • Constipation    • Depression    • Eye exam, routine 2011   • GERD (gastroesophageal reflux disease)    • H/O cardiovascular stress test 10/2015    neg   • H/O colonoscopy 08/2015     dilated and slightly tortuos colon   • H/O echocardiogram 10/2015    neg   • H/O x-ray of lumbar spine 02/2015    hypertrophic facet changes l3-s1, significant    • History of dental examination 06/2012   • Prostatitis    • Retinal tear of left eye 06/2018   • Screening PSA (prostate specific antigen) 11/2015    0.3   • Seasonal allergies       Past Surgical History:   Procedure Laterality Date   • CATARACT EXTRACTION Right 11/29/2016   • ENDOSCOPY  09/2014    neg   • EYE SURGERY Left 06/2018    retinal tear      Family History   Problem Relation Age of Onset   • Ovarian cancer Mother 59        thyroid/goiter   • Thyroid disease Mother         goiter   • Heart attack Father 66        chrohn's disease   • Crohn's disease Father    • Diabetes Sister         CVA, hyperlipidemia   • Diabetes type II Sister    • Hypothyroidism  Sister    • Stroke Sister 51   • Hyperlipidemia Sister    • Cancer Sister         intestinal   • Thyroid disease Brother         partial thyroidectomy benign   • Hyperlipidemia Brother    • Uterine cancer Paternal Grandmother 40   • Thyroid disease Paternal Grandmother         Mass   • Stroke Paternal Grandfather         before 60   • Heart attack Paternal Uncle         in 70's   • Heart disease Other    • Depression Other    • Prostate cancer Other    • Ovarian cancer Other      Social History     Socioeconomic History   • Marital status:      Spouse name: Not on file   • Number of children: Not on file   • Years of education: Not on file   • Highest education level: Not on file   Tobacco Use   • Smoking status: Never Smoker   • Smokeless tobacco: Never Used   Substance and Sexual Activity   • Alcohol use: Yes     Alcohol/week: 1.8 oz     Types: 3 Standard drinks or equivalent per week     Comment: weekly   • Drug use: No      Current Outpatient Medications on File Prior to Visit   Medication Sig Dispense Refill   • azelastine (ASTELIN) 0.1 % nasal spray 2 sprays each mostril bid 3 each 3   • clonazePAM (KlonoPIN) 0.5 MG tablet 1 qd prn anxiety 90 tablet 0   • omeprazole (priLOSEC) 40 MG capsule Take 40 mg by mouth Daily.     • omeprazole (priLOSEC) 40 MG capsule TAKE ONE CAPSULE BY MOUTH DAILY 90 capsule 2   • temazepam (RESTORIL) 30 MG capsule Take 1 capsule by mouth At Night As Needed for Sleep. 90 capsule 0   • [DISCONTINUED] buPROPion (WELLBUTRIN) 75 MG tablet 1qd 90 tablet 3     No current facility-administered medications on file prior to visit.       Allergies   Allergen Reactions   • Atorvastatin Myalgia   • Penicillins Rash   • Pravastatin Myalgia        Review of Systems   Constitutional: Positive for activity change and fatigue.   HENT: Positive for congestion, sinus pressure, sneezing and tinnitus.    Eyes: Negative.    Respiratory: Negative.    Cardiovascular: Positive for leg swelling.  "  Gastrointestinal: Positive for constipation.   Endocrine: Negative.    Genitourinary: Negative.    Musculoskeletal: Positive for arthralgias and neck stiffness.   Skin: Negative.    Allergic/Immunologic: Negative.    Neurological: Negative.    Hematological: Negative.    Psychiatric/Behavioral: Negative.         The patient's Review of Systems was personally reviewed and confirmed as accurate.    The following portions of the patient's history were reviewed and updated as appropriate: allergies, current medications, past family history, past medical history, past social history, past surgical history and problem list.    Physical Exam  Pulse 91, height 189.5 cm (74.61\"), weight 94.3 kg (207 lb 14.3 oz), SpO2 98 %.    Body mass index is 26.26 kg/m².    GENERAL APPEARANCE: awake, alert & oriented x 3, in no acute distress and well developed, well nourished  PSYCH: normal affect  LUNGS:  breathing nonlabored  EYES: sclera anicteric  CARDIOVASCULAR: palpable dorsalis pedis, palpable posterior tibial bilaterally. Capillary refill less than 2 seconds  EXTREMITIES: no clubbing, cyanosis  GAIT:  Normal           Right Hip Exam:  RANGE OF MOTION:   FLEXION CONTRACTURE: None   FLEXION: 110 degrees   INTERNAL ROTATION: 20 degrees at 90 degrees of flexion   EXTERNAL ROTATION: 40 degrees at 90 degrees of flexion    PAIN WITH HIP MOTION: no  PAIN WITH LOGROLL: no  STINCHFIELD TEST: negative    KNEE EXAM: full knee ROM (0-120), stable to varus/valgus stress at terminal extension and 30 degrees     STRENGTH:  5/5 hip adduction, abduction, flexion. 5/5 strength knee flexion, extension. 5/5 strength ankle dorsiflexion and plantarflexion.     GREATER TROCHANTER BURSAL PAIN:  yes     REFLEXES:   PATELLAR 2+/4   ACHILLES 2+/4    CLONUS: negative  STRAIGHT LEG TEST:   negative    SENSATION TO LIGHT TOUCH:  DEEP PERONEAL/SUPERFICIAL PERONEAL/SURAL/SAPHENOUS/TIBIAL:  intact    EDEMA:   no  ERYTHEMA:  no  WOUNDS/INCISIONS: none, no " overlying skin problems.      Left Hip Exam:   RANGE OF MOTION:   FLEXION CONTRACTURE: None   FLEXION: 110 degrees   INTERNAL ROTATION: 20 degrees at 90 degrees of flexion   EXTERNAL ROTATION: 40 degrees at 90 degrees of flexion    PAIN WITH HIP MOTION: no  PAIN WITH LOGROLL: no  STINCHFIELD TEST: negative    KNEE EXAM: full knee ROM (0-120), stable to varus/valgus stress at terminal extension and 30 degrees     STRENGTH:  5/5 hip adduction, abduction, flexion. 5/5 strength knee flexion, extension. 5/5 strength ankle dorsiflexion and plantarflexion.     GREATER TROCHANTER BURSAL PAIN: Mild     REFLEXES:   PATELLAR 2+/4   ACHILLES 2+/4    CLONUS: negative  STRAIGHT LEG TEST:   negative    SENSATION TO LIGHT TOUCH:  DEEP PERONEAL/SUPERFICIAL PERONEAL/SURAL/SAPHENOUS/TIBIAL:  intact    EDEMA:   no  ERYTHEMA:  no  WOUNDS/INCISIONS: none, no overlying skin problems.      ------------------------------------------------------------------    LEG LENGTHS:  equal  _____________________________________________________  _____________________________________________________    RADIOGRAPHIC FINDINGS:   Indication: Right hip pain    Comparison: No prior xrays are available for comparison    AP pelvis, hip 2 views: Right: mild joint space narrowing, there are marginal osteophytes visualized at the femoral head-neck junction and the margins of the acetabulum; Left: mild joint space narrowing, there are marginal osteophytes visualized at the femoral head-neck junction and the margins of the acetabulum    Assessment/Plan:   Diagnosis Plan   1. Bilateral hip pain  XR Hips Bilateral With or Without Pelvis 2 View   2. Trochanteric bursitis of both hips  Large Joint Arthrocentesis: R greater trochanteric bursa    Ambulatory Referral to Physical Therapy Evaluate and treat     Patient symptoms are consistent with trochanteric bursitis of the bilateral hips.  His right hip is more severe than the left.  I recommended a cortisone injection  in the right hip trochanteric bursa.  We will then proceed with physical therapy.  He is agreeable to this plan.  Outpatient therapy was prescribed.  Injection performed.  Follow-up 3 months.    Procedure Note:  I discussed with the patient the potential benefits of performing a therapeutic injection of the right hip trochanteric bursa as well as potential risks including but not limited to infection, swelling, pain, bleeding, bruising, nerve/vessel damage, skin color changes, transient elevation in blood glucose levels, and fat atrophy. After informed consent and after the area was prepped with alcohol, ethyl chloride was used to numb the skin. Via the direct lateral approach, 3cc of 1% lidocaine, 3cc of 0.25% marcaine and 2 cc of 40mg/ml of Kenalog were injected into the right hip trochanteric bursa. The patient tolerated the procedure well. There were no complications. A sterile dressing was placed over the injection site.      Wild Lopez MD  05/02/19  8:20 AM

## 2019-05-09 ENCOUNTER — HOSPITAL ENCOUNTER (OUTPATIENT)
Dept: PHYSICAL THERAPY | Facility: HOSPITAL | Age: 59
Setting detail: THERAPIES SERIES
Discharge: HOME | End: 2019-05-09

## 2019-05-09 DIAGNOSIS — M70.61 TROCHANTERIC BURSITIS OF BOTH HIPS: Primary | ICD-10-CM

## 2019-05-09 DIAGNOSIS — M70.62 TROCHANTERIC BURSITIS OF BOTH HIPS: Primary | ICD-10-CM

## 2019-05-09 PROCEDURE — 97161 PT EVAL LOW COMPLEX 20 MIN: CPT | Performed by: PHYSICAL THERAPIST

## 2019-05-09 NOTE — THERAPY EVALUATION
Outpatient Physical Therapy Ortho Initial Evaluation  [facility]     Patient Name: Tyler Doss  : 1960  MRN: [id number]  Today's Date: 2019      Visit Date: 2019    Patient Active Problem List   Diagnosis   • Hyperlipidemia   • Primary insomnia   • Anxiety   • Esophageal reflux   • Depressive disorder   • Attention deficit hyperactivity disorder   • Allergic rhinitis   • Dysfunction of eustachian tube   • Obstructive sleep apnea, adult   • Overweight        Past Medical History:   Diagnosis Date   • Anxiety    • Constipation    • Depression    • Eye exam, routine    • GERD (gastroesophageal reflux disease)    • H/O cardiovascular stress test 10/2015    neg   • H/O colonoscopy 2015     dilated and slightly tortuos colon   • H/O echocardiogram 10/2015    neg   • H/O x-ray of lumbar spine 2015    hypertrophic facet changes l3-s1, significant    • History of dental examination 2012   • Prostatitis    • Retinal tear of left eye 2018   • Screening PSA (prostate specific antigen) 2015    0.3   • Seasonal allergies         Past Surgical History:   Procedure Laterality Date   • CATARACT EXTRACTION Right 2016   • ENDOSCOPY  2014    neg   • EYE SURGERY Left 2018    retinal tear       Visit Dx:     ICD-10-CM ICD-9-CM   1. Trochanteric bursitis of both hips M70.61 726.5    M70.62          Patient History     Row Name 19 1400             History    Chief Complaint  Pain  -CP      Type of Pain  Hip pain  -CP      Date Current Problem(s) Began  18  -CP      Brief Description of Current Complaint  Pt states insidious onset of bilateral hip pain began in Dec 2018, states may have over used it with more activity. He states more pain in right hip (80%) compared to left hip (20%). He denies trauma, fall, or previous hip injury. States no change with NSAID use, f/u with Dr. Lopez, had injection to R hip last week with mild relief of pain in the past few days. Pt  states he is active at work, enjoys hiking, and has [place].   -CP      Previous treatment for THIS PROBLEM  Injections  -CP      Patient/Caregiver Goals  Relieve pain;Improve mobility  -CP      Current Tobacco Use  no  -CP      Smoking Status  no  -CP      Patient's Rating of General Health  Very good  -CP      Hand Dominance  right-handed  -CP      Occupation/sports/leisure activities  Pt is , lives in 2 story home, has full flight of stairs with noted pain in bilat hips. He works full time as  at [employer], walking a lot (wears lighter boots). Hobbies include hiking, yardwork, and going to the gym 3x/week (elliptical, low weights and high reps machine work).   -CP      Patient seeing anyone else for problem(s)?  yes- Dr. Lopez  -CP      How has patient tried to help current problem?  rest, injection  -CP      What clinical tests have you had for this problem?  X-ray  -CP      Results of Clinical Tests  negative to acute pathology. See official report for results  -CP      History of Previous Related Injuries  no  -CP         Pain     Pain Location  Hip  -CP      Pain at Present  0  -CP      Pain at Best  0  -CP      Pain at Worst  6  -CP      Pain Frequency  Intermittent  -CP      What Performance Factors Make the Current Problem(s) WORSE?  increased pain with prolonged sitting, incline walking with decline.   -CP      Is your sleep disturbed?  Yes  -CP      Is medication used to assist with sleep?  No  -CP      What position do you sleep in?  Left sidelying;Supine  -CP         Fall Risk Assessment    Any falls in the past year:  No  -CP         Daily Activities    Primary Language  English  -CP      Pt Participated in POC and Goals  Yes  -CP         Safety    Are you being hurt, hit, or frightened by anyone at home or in your life?  No  -CP        User Key  (r) = Recorded By, (t) = Taken By, (c) = Cosigned By    Initials Name Provider Type    CP Perkins, Corinne E,  PT Physical Therapist          PT Ortho     Row Name 05/09/19 1400       Subjective Comments    Subjective Comments  Pt presents with c/o bilateral hip pain, with right hip pain worse than left hip pain.   -CP       Subjective Pain    Able to rate subjective pain?  yes  -CP    Pre-Treatment Pain Level  1 tightness  -CP    Post-Treatment Pain Level  1  -CP       Posture/Observations    Posture/Observations Comments  Mild trendelenburg noted with ambulation.   -CP       Special Tests/Palpation    Special Tests/Palpation  Hip  -CP       Hip/Thigh Palpation    Hip/Thigh Palpation?  Yes  -CP    Greater Trochanter  Right:;Tender  -CP    Gluteus Medius  Right:;Tender  -CP       Hip Accessory Motions    Hip Accessory Motions Tested?  Yes  -CP       Hip Special Tests    RUBY (hip vs SI pathology)  Right:;Positive;Left:;Negative  -CP    Danay’s test (tightness of ITB)  Bilateral:;Positive R>L   -CP    Hip scour test (labral vs hip pathology)  Negative  -CP    Piriformis test (piriformis syndrome)  Negative  -CP       General ROM    RT Lower Ext  -- WFL;   -CP       MMT (Manual Muscle Testing)    Rt Lower Ext  Rt Hip Flexion;Rt Hip Extension;Rt Hip ABduction;Rt Hip ADduction;Rt Knee Extension;Rt Knee Flexion  -CP    Lt Lower Ext  Lt Hip Flexion;Lt Hip Extension;Lt Hip ABduction;Lt Hip ADduction;Lt Knee Extension;Lt Knee Flexion  -CP       MMT Right Lower Ext    Rt Hip Flexion MMT, Gross Movement  (5/5) normal  -CP    Rt Hip Extension MMT, Gross Movement  (4+/5) good plus  -CP    Rt Hip ABduction MMT, Gross Movement  (4+/5) good plus  -CP    Rt Hip ADduction MMT, Gross Movement  (4+/5) good plus  -CP    Rt Knee Extension MMT, Gross Movement  (5/5) normal  -CP    Rt Knee Flexion MMT, Gross Movement  (5/5) normal  -CP       MMT Left Lower Ext    Lt Hip Flexion MMT, Gross Movement  (5/5) normal  -CP    Lt Hip Extension MMT, Gross Movement  (4+/5) good plus  -CP    Lt Hip ABduction MMT, Gross Movement  (4+/5) good plus  -CP    Lt  Hip ADduction MMT, Gross Movement  (5/5) normal  -CP    Lt Knee Extension MMT, Gross Movement  (5/5) normal  -CP    Lt Knee Flexion MMT, Gross Movement  (5/5) normal  -CP       Sensation    Sensation WNL?  WNL  -CP    Light Touch  No apparent deficits  -CP       Flexibility    Flexibility Tested?  Lower Extremity  -CP       Lower Extremity Flexibility    Hamstrings  Right:;Mildly limited  -CP    ITB  Right:;Moderately limited;Left:;WNL  -CP    Hip External Rotators  Right:;Moderately limited;Left:;Mildly limited  -CP       Pathomechanics    Lower Extremity Pathomechanics  Trendelenburg with midstance  -CP       Gait/Stairs Assessment/Training    Comment (Gait/Stairs)  Pt ambulated with step through gait pattern, equal step length and mild trendelenburg noted.   -CP      User Key  (r) = Recorded By, (t) = Taken By, (c) = Cosigned By    Initials Name Provider Type    CP Perkins, Corinne E, PT Physical Therapist                      Therapy Education  Education Details: HEP: ITB stretch, RUBY stretch. Discussed gym routine and options for pain mgmt.   Given: HEP  Program: New  How Provided: Verbal, Demonstration, Written  Provided to: Patient  Level of Understanding: Teach back education performed, Verbalized, Demonstrated     PT OP Goals     Row Name 05/09/19 1400          PT Short Term Goals    STG Date to Achieve  05/23/19  -CP     STG 1  Patient will report decreased pain rating on VAS from 3-4/10 to 1-2/10 with recreational activities.  -CP     STG 1 Progress  New  -CP     STG 2  Patient will demonstrate an independent HEP for core and hip strength and flexibility/ROM.  -CP     STG 2 Progress  New  -CP        Long Term Goals    LTG Date to Achieve  06/08/19  -CP     LTG 1  Patient will demonstrate independent HEP progressed for closed chain strength, proprioception, balance and agility with minimal or no compensations or exacerbations.  -CP     LTG 1 Progress  New  -CP     LTG 2  Patient will negotiate stairs  reciprocally with rail support as needed without right lateral hip pain.   -CP     LTG 2 Progress  New  -CP     LTG 3  Patient will report reduced functional limitations on functional outcome measure by 10 points.  -CP     LTG 3 Progress  New  -CP     LTG 4  Pt will return to hobby of hiking and using elliptical at gym for 10 min without increased hip pain.   -CP     LTG 4 Progress  New  -CP        Time Calculation    PT Goal Re-Cert Due Date  08/07/19  -CP       User Key  (r) = Recorded By, (t) = Taken By, (c) = Cosigned By    Initials Name Provider Type    CP Perkins, Corinne E, PT Physical Therapist          PT Assessment/Plan     Row Name 05/09/19 1400          PT Assessment    Functional Limitations  Impaired gait;Limitation in home management;Limitations in community activities;Performance in leisure activities;Performance in work activities  -CP     Impairments  Endurance;Gait;Impaired flexibility;Pain;Muscle strength  -CP     Assessment Comments  Pt is a 57 yo male referred to PT for bilateral trochanteric bursitis with RLE more painful than LLE. He recently had injection to right hip last week with noted improvement in overall lateral hip pain. Pt demonstrated bilateral hip abduction weakness, right ITB tightness, right hip ER tightness, and decreased activity tolerance. Pt would benefit from skilled PT, including ther exercises, manual, NMRE, and modalities as indicated to decrease pain and return to PLOF.   -CP     Please refer to paper survey for additional self-reported information  Yes  -CP     Rehab Potential  Good  -CP     Patient/caregiver participated in establishment of treatment plan and goals  Yes  -CP     Patient would benefit from skilled therapy intervention  Yes  -CP        PT Plan    PT Frequency  1x/week  -CP     Predicted Duration of Therapy Intervention (Therapy Eval)  4-6 visits  -CP     Planned CPT's?  PT EVAL LOW COMPLEXITY: 55768;PT RE-EVAL: 80541;PT THER PROC EA 15 MIN: 12154;PT  MANUAL THERAPY EA 15 MIN: 27968;PT NEUROMUSC RE-EDUCATION EA 15 MIN: 85858;PT GAIT TRAINING EA 15 MIN: 88270;PT ELECTRICAL STIM UNATTEND: ;PT ULTRASOUND EA 15 MIN: 96894;PT HOT/COLD PACK WC NONMCARE: 51644;PT IONTOPHORESIS EA 15 MIN: 58444  -CP     PT Plan Comments  Assess response from initial HEP stretches. Initiate hip abd and hip ext strengthening ther exercises next visit, CKC positions as tolerated, and progress gym routine. Manual and modalities as indicated.   -CP       User Key  (r) = Recorded By, (t) = Taken By, (c) = Cosigned By    Initials Name Provider Type    CP Perkins, Corinne E, PT Physical Therapist            Exercises     Row Name 05/09/19 1400             Subjective Comments    Subjective Comments  Pt presents with c/o bilateral hip pain, with right hip pain worse than left hip pain.   -CP         Subjective Pain    Able to rate subjective pain?  yes  -CP      Pre-Treatment Pain Level  1 tightness  -CP      Post-Treatment Pain Level  1  -CP        User Key  (r) = Recorded By, (t) = Taken By, (c) = Cosigned By    Initials Name Provider Type    CP Perkins, Corinne E, PT Physical Therapist                        Outcome Measure Options: Lower Extremity Functional Scale (LEFS)  Lower Extremity Functional Index  Any of your usual work, housework or school activities: A little bit of difficulty  Your usual hobbies, recreational or sporting activities: Moderate difficulty  Getting into or out of the bath: No difficulty  Walking between rooms: No difficulty  Putting on your shoes or socks: No difficulty  Squatting: No difficulty  Lifting an object, like a bag of groceries from the floor: A little bit of difficulty  Performing light activities around your home: No difficulty  Performing heavy activities around your home: Moderate difficulty  Getting into or out of a car: No difficulty  Walking 2 blocks: No difficulty  Walking a mile: No difficulty  Going up or down 10 stairs (about 1 flight of  stairs): A little bit of difficulty  Standing for 1 hour: Moderate difficulty  Sitting for 1 hour: Moderate difficulty  Running on even ground: A little bit of difficulty  Running on uneven ground: A little bit of difficulty  Making sharp turns while running fast: A little bit of difficulty  Hopping: A little bit of difficulty  Rolling over in bed: No difficulty  Total: 65      Time Calculation:     Start Time: 1415     Therapy Charges for Today     Code Description Service Date Service Provider Modifiers Qty    91956643442 HC PT EVAL LOW COMPLEXITY 4 5/9/2019 Perkins, Corinne E, PT GP 1          PT G-Codes  Outcome Measure Options: Lower Extremity Functional Scale (LEFS)  Total: 65         Corinne E. Perkins, PT  5/9/2019

## 2019-05-15 ENCOUNTER — HOSPITAL ENCOUNTER (OUTPATIENT)
Dept: PHYSICAL THERAPY | Facility: HOSPITAL | Age: 59
Setting detail: THERAPIES SERIES
Discharge: HOME | End: 2019-05-15

## 2019-05-15 DIAGNOSIS — M70.61 TROCHANTERIC BURSITIS OF BOTH HIPS: Primary | ICD-10-CM

## 2019-05-15 DIAGNOSIS — M70.62 TROCHANTERIC BURSITIS OF BOTH HIPS: Primary | ICD-10-CM

## 2019-05-15 PROCEDURE — 97110 THERAPEUTIC EXERCISES: CPT | Performed by: PHYSICAL THERAPIST

## 2019-05-15 NOTE — THERAPY TREATMENT NOTE
Outpatient Physical Therapy Ortho Treatment Note   [facility]     Patient Name: Tyler Doss  : 1960  MRN: [id number]  Today's Date: 5/15/2019      Visit Date: 05/15/2019    Visit Dx:    ICD-10-CM ICD-9-CM   1. Trochanteric bursitis of both hips M70.61 726.5    M70.62        Patient Active Problem List   Diagnosis   • Hyperlipidemia   • Primary insomnia   • Anxiety   • Esophageal reflux   • Depressive disorder   • Attention deficit hyperactivity disorder   • Allergic rhinitis   • Dysfunction of eustachian tube   • Obstructive sleep apnea, adult   • Overweight        Past Medical History:   Diagnosis Date   • Anxiety    • Constipation    • Depression    • Eye exam, routine    • GERD (gastroesophageal reflux disease)    • H/O cardiovascular stress test 10/2015    neg   • H/O colonoscopy 2015     dilated and slightly tortuos colon   • H/O echocardiogram 10/2015    neg   • H/O x-ray of lumbar spine 2015    hypertrophic facet changes l3-s1, significant    • History of dental examination 2012   • Prostatitis    • Retinal tear of left eye 2018   • Screening PSA (prostate specific antigen) 2015    0.3   • Seasonal allergies         Past Surgical History:   Procedure Laterality Date   • CATARACT EXTRACTION Right 2016   • ENDOSCOPY  2014    neg   • EYE SURGERY Left 2018    retinal tear                       PT Assessment/Plan     Row Name 05/15/19 1500          PT Assessment    Assessment Comments  Pt educated on progressed HEP for improved hip abduction strength, given written copy and RTB for home use after demonstrating understanding in clinic. Verbalized improved right hip pain, rated 0-1/10 with activity.   -CP        PT Plan    PT Plan Comments  Pt will be out of town next week, will f/u with patient in 2 weeks. Assess compliance with HEP and progress as tolerated.   -CP       User Key  (r) = Recorded By, (t) = Taken By, (c) = Cosigned By    Initials Name Provider Type     CP Perkins, Corinne E, PT Physical Therapist            Exercises     Row Name 05/15/19 1500             Subjective Comments    Subjective Comments  Pt states left hip pain has improved with ITB stretches and injection.   -CP         Subjective Pain    Able to rate subjective pain?  yes  -CP      Pre-Treatment Pain Level  1 left hip  -CP      Post-Treatment Pain Level  0  -CP         Total Minutes    83363 - PT Therapeutic Exercise Minutes  29  -CP         Exercise 1    Exercise Name 1  Nustep Level 7  -CP      Time 1  4 minutes  -CP         Exercise 2    Exercise Name 2  seated RUBY stretch  -CP      Cueing 2  Verbal  -CP      Reps 2  2x  -CP         Exercise 3    Exercise Name 3  2 way hip standing (hip abd, hip ext)  -CP      Cueing 3  Verbal;Demo  -CP      Reps 3  10x each  -CP      Additional Comments  with UE support  -CP         Exercise 4    Exercise Name 4  Side stepping, zig zag stepping with RTB above knees  -CP      Cueing 4  Verbal;Demo  -CP      Reps 4  2x 30 ft  -CP      Additional Comments  RTB  -CP         Exercise 5    Exercise Name 5  standing tensor stretch, ITB stretch  -CP      Cueing 5  Verbal  -CP      Reps 5  2x each  -CP         Exercise 6    Exercise Name 6  TG squats  -CP      Cueing 6  Verbal;Demo  -CP      Reps 6  15x  -CP         Exercise 7    Exercise Name 7  sidelying ITB stretch  -CP      Reps 7  2x each  -CP        User Key  (r) = Recorded By, (t) = Taken By, (c) = Cosigned By    Initials Name Provider Type    CP Perkins, Corinne E, PT Physical Therapist                           Therapy Education  Education Details: See written chart for progression of HEP.  Given: HEP, Symptoms/condition management  Program: Reinforced, Progressed  How Provided: Verbal, Demonstration, Written  Provided to: Patient  Level of Understanding: Teach back education performed, Verbalized, Demonstrated              Time Calculation:   Start Time: 1500  Total Timed Code Minutes- PT: 29 minute(s)  Therapy  Charges for Today     Code Description Service Date Service Provider Modifiers Qty    74613356802 HC PT THER PROC EA 15 MIN 5/15/2019 Perkins, Corinne E, PT GP 2                    Corinne E. Perkins, PT  5/15/2019

## 2019-06-05 ENCOUNTER — OFFICE VISIT (OUTPATIENT)
Dept: INTERNAL MEDICINE | Facility: CLINIC | Age: 59
End: 2019-06-05

## 2019-06-05 ENCOUNTER — HOSPITAL ENCOUNTER (OUTPATIENT)
Dept: PHYSICAL THERAPY | Facility: HOSPITAL | Age: 59
Setting detail: THERAPIES SERIES
Discharge: HOME | End: 2019-06-05

## 2019-06-05 VITALS
HEIGHT: 75 IN | BODY MASS INDEX: 26.04 KG/M2 | DIASTOLIC BLOOD PRESSURE: 80 MMHG | SYSTOLIC BLOOD PRESSURE: 102 MMHG | WEIGHT: 209.4 LBS | TEMPERATURE: 98 F

## 2019-06-05 DIAGNOSIS — Z79.899 HIGH RISK MEDICATIONS (NOT ANTICOAGULANTS) LONG-TERM USE: ICD-10-CM

## 2019-06-05 DIAGNOSIS — F51.01 PRIMARY INSOMNIA: ICD-10-CM

## 2019-06-05 DIAGNOSIS — M70.62 TROCHANTERIC BURSITIS OF BOTH HIPS: Primary | ICD-10-CM

## 2019-06-05 DIAGNOSIS — R10.30 LOWER ABDOMINAL PAIN: ICD-10-CM

## 2019-06-05 DIAGNOSIS — R11.0 NAUSEA: Primary | ICD-10-CM

## 2019-06-05 DIAGNOSIS — M70.61 TROCHANTERIC BURSITIS OF BOTH HIPS: Primary | ICD-10-CM

## 2019-06-05 LAB
ALBUMIN SERPL-MCNC: 4.3 G/DL (ref 3.5–5.2)
ALBUMIN/GLOB SERPL: 1.8 G/DL
ALP SERPL-CCNC: 43 U/L (ref 39–117)
ALT SERPL W P-5'-P-CCNC: 23 U/L (ref 1–41)
AMPHET+METHAMPHET UR QL: NEGATIVE
AMPHETAMINES UR QL: NEGATIVE
AMYLASE SERPL-CCNC: 39 U/L (ref 28–100)
ANION GAP SERPL CALCULATED.3IONS-SCNC: 14.6 MMOL/L
AST SERPL-CCNC: 21 U/L (ref 1–40)
BARBITURATES UR QL SCN: NEGATIVE
BASOPHILS # BLD AUTO: 0.02 10*3/MM3 (ref 0–0.2)
BASOPHILS NFR BLD AUTO: 0.3 % (ref 0–1.5)
BENZODIAZ UR QL SCN: POSITIVE
BILIRUB SERPL-MCNC: 0.2 MG/DL (ref 0.2–1.2)
BUN BLD-MCNC: 19 MG/DL (ref 8–23)
BUN/CREAT SERPL: 22.4 (ref 7–25)
BUPRENORPHINE SERPL-MCNC: NEGATIVE NG/ML
CALCIUM SPEC-SCNC: 9.4 MG/DL (ref 8.6–10.5)
CANNABINOIDS SERPL QL: NEGATIVE
CHLORIDE SERPL-SCNC: 100 MMOL/L (ref 98–107)
CO2 SERPL-SCNC: 23.4 MMOL/L (ref 22–29)
COCAINE UR QL: NEGATIVE
CREAT BLD-MCNC: 0.85 MG/DL (ref 0.76–1.27)
DEPRECATED RDW RBC AUTO: 51.7 FL (ref 37–54)
EOSINOPHIL # BLD AUTO: 0.12 10*3/MM3 (ref 0–0.4)
EOSINOPHIL NFR BLD AUTO: 1.9 % (ref 0.3–6.2)
ERYTHROCYTE [DISTWIDTH] IN BLOOD BY AUTOMATED COUNT: 14.3 % (ref 12.3–15.4)
GFR SERPL CREATININE-BSD FRML MDRD: 92 ML/MIN/1.73
GLOBULIN UR ELPH-MCNC: 2.4 GM/DL
GLUCOSE BLD-MCNC: 105 MG/DL (ref 65–99)
HCT VFR BLD AUTO: 45.8 % (ref 37.5–51)
HGB BLD-MCNC: 14.1 G/DL (ref 13–17.7)
IMM GRANULOCYTES # BLD AUTO: 0.02 10*3/MM3 (ref 0–0.05)
IMM GRANULOCYTES NFR BLD AUTO: 0.3 % (ref 0–0.5)
LIPASE SERPL-CCNC: 21 U/L (ref 13–60)
LYMPHOCYTES # BLD AUTO: 1.74 10*3/MM3 (ref 0.7–3.1)
LYMPHOCYTES NFR BLD AUTO: 26.9 % (ref 19.6–45.3)
MCH RBC QN AUTO: 30.2 PG (ref 26.6–33)
MCHC RBC AUTO-ENTMCNC: 30.8 G/DL (ref 31.5–35.7)
MCV RBC AUTO: 98.1 FL (ref 79–97)
METHADONE UR QL SCN: NEGATIVE
MONOCYTES # BLD AUTO: 0.72 10*3/MM3 (ref 0.1–0.9)
MONOCYTES NFR BLD AUTO: 11.1 % (ref 5–12)
NEUTROPHILS # BLD AUTO: 3.84 10*3/MM3 (ref 1.7–7)
NEUTROPHILS NFR BLD AUTO: 59.5 % (ref 42.7–76)
NRBC BLD AUTO-RTO: 0 /100 WBC (ref 0–0.2)
OPIATES UR QL: NEGATIVE
OXYCODONE UR QL SCN: NEGATIVE
PCP UR QL SCN: NEGATIVE
PLATELET # BLD AUTO: 329 10*3/MM3 (ref 140–450)
PMV BLD AUTO: 10.8 FL (ref 6–12)
POTASSIUM BLD-SCNC: 4.6 MMOL/L (ref 3.5–5.2)
PROPOXYPH UR QL: NEGATIVE
PROT SERPL-MCNC: 6.7 G/DL (ref 6–8.5)
RBC # BLD AUTO: 4.67 10*6/MM3 (ref 4.14–5.8)
SODIUM BLD-SCNC: 138 MMOL/L (ref 136–145)
TRICYCLICS UR QL SCN: NEGATIVE
WBC NRBC COR # BLD: 6.46 10*3/MM3 (ref 3.4–10.8)

## 2019-06-05 PROCEDURE — 80053 COMPREHEN METABOLIC PANEL: CPT | Performed by: INTERNAL MEDICINE

## 2019-06-05 PROCEDURE — 99214 OFFICE O/P EST MOD 30 MIN: CPT | Performed by: INTERNAL MEDICINE

## 2019-06-05 PROCEDURE — 83690 ASSAY OF LIPASE: CPT | Performed by: INTERNAL MEDICINE

## 2019-06-05 PROCEDURE — 97110 THERAPEUTIC EXERCISES: CPT | Performed by: PHYSICAL THERAPIST

## 2019-06-05 PROCEDURE — 80306 DRUG TEST PRSMV INSTRMNT: CPT | Performed by: INTERNAL MEDICINE

## 2019-06-05 PROCEDURE — 85025 COMPLETE CBC W/AUTO DIFF WBC: CPT | Performed by: INTERNAL MEDICINE

## 2019-06-05 PROCEDURE — 82150 ASSAY OF AMYLASE: CPT | Performed by: INTERNAL MEDICINE

## 2019-06-05 NOTE — PROGRESS NOTES
"Subjective   Tyler Doss is a 58 y.o. male.     History of Present Illness     Patient has had abdominal discomfort over the last 2 months.  Seems to be worse after eating high fat or high-fiber foods.  Not a lot of pain more discomfort, bloating and nausea.  Last week, had a worse episode with more severe nausea with every meal.  He had also travels earlier this spring, and went 4 days without a bowel movement.  He has a history of chronic constipation, but the symptoms seem different than his usual.  Over the last week, he has been trying to eat low-fat more bland food and nausea is definitely better. Did feel better after he has a BM, but this seems  His last colonoscopy was in 2015 with , and this was normal except for tortuous colon  He had endoscopy in 2014 which was normal; this was done because of dysphasia.  Biopsies were negative for eosinophilic esophagitis.  He also had upper GI with small bowel follow-through in 2015 which was normal    Insomnia - no problems w/ the temazepam. Needs refill later this month.      The following portions of the patient's history were reviewed and updated as appropriate: allergies, current medications, past family history, past medical history, past social history, past surgical history and problem list.    Review of Systems   Constitutional: Negative for activity change, appetite change, fever, unexpected weight gain and unexpected weight loss.   Gastrointestinal: Positive for abdominal distention, constipation, nausea and GERD (some even w/ the omepraozle, will occasionally take an extra). Negative for blood in stool, diarrhea and vomiting.   Musculoskeletal: Positive for arthralgias.   Allergic/Immunologic: Negative for immunocompromised state.   Psychiatric/Behavioral: Positive for sleep disturbance.       Objective   /80 (BP Location: Left arm)   Temp 98 °F (36.7 °C) (Temporal)   Ht 189.5 cm (74.6\")   Wt 95 kg (209 lb 6.4 oz)   BMI 26.45 kg/m² "   Physical Exam   Constitutional: He is oriented to person, place, and time. He appears well-developed and well-nourished. No distress.   HENT:   Head: Normocephalic and atraumatic.   Eyes: Conjunctivae and EOM are normal. Pupils are equal, round, and reactive to light. Right eye exhibits no discharge. Left eye exhibits no discharge.   Neck: Normal range of motion. Neck supple.   Cardiovascular: Normal rate and regular rhythm.   Pulmonary/Chest: Effort normal and breath sounds normal.   Abdominal: Soft. Bowel sounds are normal. He exhibits no distension and no mass. There is no tenderness. There is no rebound and no guarding. No hernia.   Musculoskeletal: He exhibits no edema.   Neurological: He is alert and oriented to person, place, and time. No cranial nerve deficit.   Skin: Skin is warm and dry. No rash noted. He is not diaphoretic.   Psychiatric: He has a normal mood and affect. His behavior is normal. Judgment and thought content normal.   Nursing note and vitals reviewed.        Assessment/Plan   Tyler was seen today for nausea, abdomen cramps, constipation and insomnia.    Diagnoses and all orders for this visit:    Nausea -we will check labs and gallbladder ultrasound.  If this is negative, might consider repeat endoscopy  -     Comprehensive Metabolic Panel  -     CBC & Differential  -     Lipase  -     Amylase  -     CBC Auto Differential  -     US Gallbladder; Future    Lower abdominal pain  -     Comprehensive Metabolic Panel  -     CBC & Differential  -     Lipase  -     Amylase  -     CBC Auto Differential  -     US Gallbladder; Future    Primary insomnia -stable on temazepam.  Does not need refill yet but will need it in the next 2 weeks.  We will send in 2 weeks  -     Urine Drug Screen - Urine, Clean Catch    High risk medications (not anticoagulants) long-term use  -     Urine Drug Screen - Urine, Clean Catch        Pt has already signed controlled substance contract. Hema done today and  appropriate. We will do UDS today

## 2019-06-05 NOTE — THERAPY PROGRESS REPORT/RE-CERT
Outpatient Physical Therapy Ortho Progress Note   [facility]     Patient Name: Tyler Doss  : 1960  MRN: [id number]  Today's Date: 2019      Visit Date: 2019    Visit Dx:    ICD-10-CM ICD-9-CM   1. Trochanteric bursitis of both hips M70.61 726.5    M70.62        Patient Active Problem List   Diagnosis   • Hyperlipidemia   • Primary insomnia   • Anxiety   • Esophageal reflux   • Depressive disorder   • Attention deficit hyperactivity disorder   • Allergic rhinitis   • Dysfunction of eustachian tube   • Obstructive sleep apnea, adult   • Overweight        Past Medical History:   Diagnosis Date   • Anxiety    • Constipation    • Depression    • Eye exam, routine    • GERD (gastroesophageal reflux disease)    • H/O cardiovascular stress test 10/2015    neg   • H/O colonoscopy 2015     dilated and slightly tortuos colon   • H/O echocardiogram 10/2015    neg   • H/O x-ray of lumbar spine 2015    hypertrophic facet changes l3-s1, significant    • History of dental examination 2012   • Prostatitis    • Retinal tear of left eye 2018   • Screening PSA (prostate specific antigen) 2015    0.3   • Seasonal allergies         Past Surgical History:   Procedure Laterality Date   • CATARACT EXTRACTION Right 2016   • ENDOSCOPY  2014    neg   • EYE SURGERY Left 2018    retinal tear       PT Ortho     Row Name 19 1500       Hip/Thigh Palpation    Greater Trochanter  Right:;Tender  -CP    Gluteus Medius  Right:;Tender  -CP       Hip Special Tests    RUBY (hip vs SI pathology)  Negative  -CP    Danay’s test (tightness of ITB)  Right:;Positive;Left:;Negative  -CP    Hip scour test (labral vs hip pathology)  Negative  -CP    Piriformis test (piriformis syndrome)  Negative  -CP       MMT Right Lower Ext    Rt Hip Flexion MMT, Gross Movement  (5/5) normal  -CP    Rt Hip Extension MMT, Gross Movement  (4+/5) good plus  -CP    Rt Hip ABduction MMT, Gross Movement  (4+/5) good  plus  -CP    Rt Hip ADduction MMT, Gross Movement  (5/5) normal  -CP    Rt Knee Extension MMT, Gross Movement  (5/5) normal  -CP    Rt Knee Flexion MMT, Gross Movement  (5/5) normal  -CP       MMT Left Lower Ext    Lt Hip Flexion MMT, Gross Movement  (5/5) normal  -CP    Lt Hip Extension MMT, Gross Movement  (4+/5) good plus  -CP    Lt Hip ABduction MMT, Gross Movement  (4+/5) good plus  -CP    Lt Hip ADduction MMT, Gross Movement  (5/5) normal  -CP    Lt Knee Extension MMT, Gross Movement  (5/5) normal  -CP    Lt Knee Flexion MMT, Gross Movement  (5/5) normal  -CP       Sensation    Sensation WNL?  WNL  -CP    Light Touch  No apparent deficits  -CP       Lower Extremity Flexibility    Hamstrings  WNL  -CP    ITB  Right:;Mildly limited  -CP    Hip External Rotators  Right:;Mildly limited  -CP       Pathomechanics    Lower Extremity Pathomechanics  Trendelenburg with midstance  -CP       Gait/Stairs Assessment/Training    Comment (Gait/Stairs)  --  -CP      User Key  (r) = Recorded By, (t) = Taken By, (c) = Cosigned By    Initials Name Provider Type    CP Perkins, Corinne E, PT Physical Therapist          Functional bilateral hip AROM noted pain free.             PT Assessment/Plan     Row Name 06/05/19 1500          PT Assessment    Functional Limitations  Impaired gait;Limitation in home management;Limitations in community activities;Performance in leisure activities;Performance in work activities  -CP     Impairments  Endurance;Gait;Impaired flexibility;Pain;Muscle strength  -CP     Assessment Comments  Reassessment completed this date in clinic. He is compliant with current HEP, demonstrated functional right hip AROM, progressing with improved functional strength of right hip abd and extension. Pt would benefit from further skilled PT to improve functional mobility.   -CP     Please refer to paper survey for additional self-reported information  Yes  -CP     Rehab Potential  Good  -CP     Patient/caregiver  participated in establishment of treatment plan and goals  Yes  -CP     Patient would benefit from skilled therapy intervention  Yes  -CP        PT Plan    PT Frequency  1x/week  -CP     Predicted Duration of Therapy Intervention (Therapy Eval)  4 visits  -CP     Planned CPT's?  PT EVAL LOW COMPLEXITY: 30634;PT RE-EVAL: 88017;PT THER PROC EA 15 MIN: 32187;PT MANUAL THERAPY EA 15 MIN: 69136;PT NEUROMUSC RE-EDUCATION EA 15 MIN: 85326;PT GAIT TRAINING EA 15 MIN: 56757;PT ELECTRICAL STIM UNATTEND: ;PT ULTRASOUND EA 15 MIN: 45679;PT HOT/COLD PACK WC NONMCARE: 56508  -CP     PT Plan Comments  Progress HEP next visit and give gym pictures per patient request. Anticipate decrease PT freq after next visit to conserve total treatments.   -CP       User Key  (r) = Recorded By, (t) = Taken By, (c) = Cosigned By    Initials Name Provider Type    CP Perkins, Corinne E, PT Physical Therapist            Exercises     Row Name 06/05/19 1500             Subjective Comments    Subjective Comments  Pt presents to clinic after trip, states he has been able to hike and been more active with little right hip pain.   -CP         Subjective Pain    Able to rate subjective pain?  yes  -CP      Pre-Treatment Pain Level  1  -CP      Post-Treatment Pain Level  1  -CP         Total Minutes    03452 - PT Therapeutic Exercise Minutes  38  -CP         Exercise 1    Exercise Name 1  Reassessment completed this date in clinic.   -CP         Exercise 2    Exercise Name 2  seated RUBY stretch  -CP      Reps 2  2x  -CP         Exercise 3    Exercise Name 3  Reviewed current HEP and options for the gym. Pt given GTB for home resistance increase  -CP         Exercise 5    Exercise Name 5  standing tensor stretch, ITB stretch  -CP      Reps 5  2x each  -CP         Exercise 7    Exercise Name 7  sidelying ITB stretch  -CP      Reps 7  2x each  -CP         Exercise 8    Exercise Name 8  hamstring stretch PT assisted  -CP      Reps 8  2x each  -CP          Exercise 9    Exercise Name 9  STairs up/down  -CP      Cueing 9  Verbal;Demo  -CP      Reps 9  13  -CP      Additional Comments  reciprocal gait; no UE support  -CP        User Key  (r) = Recorded By, (t) = Taken By, (c) = Cosigned By    Initials Name Provider Type    CP Perkins, Corinne E, PT Physical Therapist                       PT OP Goals     Row Name 06/05/19 1500          PT Short Term Goals    STG Date to Achieve  06/19/19  -CP     STG 1  Patient will report decreased pain rating on VAS from 3-4/10 to 1-2/10 with recreational activities.  -CP     STG 1 Progress  Ongoing  -CP     STG 2  Patient will demonstrate an independent HEP for core and hip strength and flexibility/ROM.  -CP     STG 2 Progress  Met  -CP        Long Term Goals    LTG Date to Achieve  07/05/19  -CP     LTG 1  Patient will demonstrate independent HEP progressed for closed chain strength, proprioception, balance and agility with minimal or no compensations or exacerbations.  -CP     LTG 1 Progress  Ongoing  -CP     LTG 2  Patient will negotiate stairs reciprocally with rail support as needed without right lateral hip pain.   -CP     LTG 2 Progress  Ongoing  -CP     LTG 3  Patient will report reduced functional limitations on functional outcome measure by 10 points.  -CP     LTG 3 Progress  Met  -CP     LTG 4  Pt will return to hobby of hiking and using elliptical at gym for 10 min without increased hip pain.   -CP     LTG 4 Progress  Ongoing;Progressing  -CP     LTG 4 Progress Comments  Pt able to walk on treadmill for 15 min without pain, Nustep for 15 minutes, hasn't attempted elliptical.   -CP        Time Calculation    PT Goal Re-Cert Due Date  08/07/19  -CP       User Key  (r) = Recorded By, (t) = Taken By, (c) = Cosigned By    Initials Name Provider Type    CP Perkins, Corinne E, PT Physical Therapist               Outcome Measure Options: Lower Extremity Functional Scale (LEFS)  Lower Extremity Functional Index  Any of your  usual work, housework or school activities: A little bit of difficulty  Your usual hobbies, recreational or sporting activities: A little bit of difficulty  Getting into or out of the bath: No difficulty  Walking between rooms: No difficulty  Putting on your shoes or socks: No difficulty  Squatting: A little bit of difficulty  Lifting an object, like a bag of groceries from the floor: No difficulty  Performing light activities around your home: A little bit of difficulty  Performing heavy activities around your home: A little bit of difficulty  Getting into or out of a car: No difficulty  Walking 2 blocks: No difficulty  Walking a mile: No difficulty  Going up or down 10 stairs (about 1 flight of stairs): A little bit of difficulty  Standing for 1 hour: A little bit of difficulty  Sitting for 1 hour: No difficulty  Running on even ground: A little bit of difficulty  Running on uneven ground: A little bit of difficulty  Making sharp turns while running fast: A little bit of difficulty  Hopping: A little bit of difficulty  Rolling over in bed: No difficulty  Total: 69      Time Calculation:   Start Time: 1500  Total Timed Code Minutes- PT: 38 minute(s)  Therapy Charges for Today     Code Description Service Date Service Provider Modifiers Qty    09633560131 HC PT THER PROC EA 15 MIN 6/5/2019 Perkins, Corinne E, PT GP 3          PT G-Codes  Outcome Measure Options: Lower Extremity Functional Scale (LEFS)  Total: 69         Corinne E. Perkins, PT  6/5/2019

## 2019-06-12 ENCOUNTER — HOSPITAL ENCOUNTER (OUTPATIENT)
Dept: PHYSICAL THERAPY | Facility: HOSPITAL | Age: 59
Setting detail: THERAPIES SERIES
Discharge: HOME | End: 2019-06-12

## 2019-06-12 DIAGNOSIS — M70.61 TROCHANTERIC BURSITIS OF BOTH HIPS: Primary | ICD-10-CM

## 2019-06-12 DIAGNOSIS — M70.62 TROCHANTERIC BURSITIS OF BOTH HIPS: Primary | ICD-10-CM

## 2019-06-12 PROCEDURE — 97110 THERAPEUTIC EXERCISES: CPT | Performed by: PHYSICAL THERAPIST

## 2019-06-12 NOTE — THERAPY TREATMENT NOTE
Outpatient Physical Therapy Ortho Treatment Note   [facility]     Patient Name: Tyler Doss  : 1960  MRN: [id number]  Today's Date: 2019      Visit Date: 2019    Visit Dx:    ICD-10-CM ICD-9-CM   1. Trochanteric bursitis of both hips M70.61 726.5    M70.62        Patient Active Problem List   Diagnosis   • Hyperlipidemia   • Primary insomnia   • Anxiety   • Esophageal reflux   • Depressive disorder   • Attention deficit hyperactivity disorder   • Allergic rhinitis   • Dysfunction of eustachian tube   • Obstructive sleep apnea, adult   • Overweight        Past Medical History:   Diagnosis Date   • Anxiety    • Constipation    • Depression    • Eye exam, routine    • GERD (gastroesophageal reflux disease)    • H/O cardiovascular stress test 10/2015    neg   • H/O colonoscopy 2015     dilated and slightly tortuos colon   • H/O echocardiogram 10/2015    neg   • H/O x-ray of lumbar spine 2015    hypertrophic facet changes l3-s1, significant    • History of dental examination 2012   • Prostatitis    • Retinal tear of left eye 2018   • Screening PSA (prostate specific antigen) 2015    0.3   • Seasonal allergies         Past Surgical History:   Procedure Laterality Date   • CATARACT EXTRACTION Right 2016   • ENDOSCOPY  2014    neg   • EYE SURGERY Left 2018    retinal tear                       PT Assessment/Plan     Row Name 19 1500          PT Assessment    Assessment Comments  Pt is making steady progress towards his functional goals, demonstrated improved gait mechanics with small heel lift in R shoe. Pt is indep with current HEP and gym routine, verbalized desire to hold therapy for a few weeks with emphasis on indep mgmt/HEP. Pt educated on foam rolling options for home, demonstrated understanding in clinic.   -CP        PT Plan    PT Plan Comments  Will f/u with patient in 3 weeks for reassessment and progression of HEP as needed. If lack of f/u  required, will d/c to home with current HEP.   -CP       User Key  (r) = Recorded By, (t) = Taken By, (c) = Cosigned By    Initials Name Provider Type    CP Perkins, Corinne E, PT Physical Therapist            Exercises     Row Name 06/12/19 1500             Subjective Comments    Subjective Comments  Pt reports he has been more active recently with gym routine, walking, and biking. He states pain has improved, just notes right hip tightness.   -CP         Subjective Pain    Able to rate subjective pain?  yes  -CP      Pre-Treatment Pain Level  0  -CP      Post-Treatment Pain Level  0  -CP         Total Minutes    41260 - PT Therapeutic Exercise Minutes  32  -CP         Exercise 1    Exercise Name 1  Nustep Level 7   -CP      Time 1  5 minutes  -CP         Exercise 2    Exercise Name 2  seated RUBY stretch  -CP      Reps 2  2x  -CP         Exercise 3    Exercise Name 3  bridges  -CP      Cueing 3  Verbal  -CP      Reps 3  15x  -CP         Exercise 4    Exercise Name 4  Side stepping, zig zag stepping with RTB above knees  -CP      Reps 4  2 x 50 ft each  -CP      Additional Comments  GTB  -CP         Exercise 5    Exercise Name 5  standing tensor stretch, ITB stretch  -CP      Reps 5  2x each  -CP         Exercise 6    Exercise Name 6  TG squats  -CP      Cueing 6  Verbal  -CP      Time 6  1 minute  -CP         Exercise 7    Exercise Name 7  standing ITB stretch  -CP      Reps 7  2x  -CP         Exercise 8    Exercise Name 8  hamstring stretch PT assisted  -CP      Reps 8  2x each  -CP         Exercise 9    Exercise Name 9  standing squats without weight  -CP      Reps 9  10x  -CP      Additional Comments  good technique, denies pain  -CP        User Key  (r) = Recorded By, (t) = Taken By, (c) = Cosigned By    Initials Name Provider Type    CP Perkins, Corinne E, PT Physical Therapist                       PT OP Goals     Row Name 06/12/19 1500          PT Short Term Goals    STG Date to Achieve  06/19/19  -CP      STG 1  Patient will report decreased pain rating on VAS from 3-4/10 to 1-2/10 with recreational activities.  -CP     STG 1 Progress  Met  -CP     STG 2  Patient will demonstrate an independent HEP for core and hip strength and flexibility/ROM.  -CP     STG 2 Progress  Met  -CP        Long Term Goals    LTG Date to Achieve  07/05/19  -CP     LTG 1  Patient will demonstrate independent HEP progressed for closed chain strength, proprioception, balance and agility with minimal or no compensations or exacerbations.  -CP     LTG 1 Progress  Ongoing  -CP     LTG 2  Patient will negotiate stairs reciprocally with rail support as needed without right lateral hip pain.   -CP     LTG 2 Progress  Met  -CP     LTG 3  Patient will report reduced functional limitations on functional outcome measure by 10 points.  -CP     LTG 3 Progress  Met  -CP     LTG 4  Pt will return to hobby of hiking and using elliptical at gym for 10 min without increased hip pain.   -CP     LTG 4 Progress  Met  -CP       User Key  (r) = Recorded By, (t) = Taken By, (c) = Cosigned By    Initials Name Provider Type    CP Perkins, Corinne E, PT Physical Therapist          Therapy Education  Education Details: Reviewed current HEP, progression at home and gym per request. Increased to 3 sets of 15 reps; Pt educated on foam rolling for home, demonstrated improvement in flexibility in clinic.   Given: HEP, Symptoms/condition management, Pain management  Program: Reinforced, Progressed  How Provided: Verbal, Demonstration  Provided to: Patient  Level of Understanding: Teach back education performed, Verbalized, Demonstrated              Time Calculation:   Start Time: 1502  Total Timed Code Minutes- PT: 32 minute(s)  Therapy Charges for Today     Code Description Service Date Service Provider Modifiers Qty    43804722585 HC PT THER PROC EA 15 MIN 6/12/2019 Perkins, Corinne E, PT GP 2                    Corinne E. Perkins, PT  6/12/2019

## 2019-06-13 ENCOUNTER — APPOINTMENT (OUTPATIENT)
Dept: ULTRASOUND IMAGING | Facility: HOSPITAL | Age: 59
End: 2019-06-13

## 2019-06-18 DIAGNOSIS — F51.01 PRIMARY INSOMNIA: ICD-10-CM

## 2019-06-18 RX ORDER — TEMAZEPAM 30 MG/1
30 CAPSULE ORAL NIGHTLY PRN
Qty: 90 CAPSULE | Refills: 0 | Status: SHIPPED | OUTPATIENT
Start: 2019-06-18 | End: 2019-09-09 | Stop reason: SDUPTHER

## 2019-07-03 ENCOUNTER — APPOINTMENT (OUTPATIENT)
Dept: PHYSICAL THERAPY | Facility: HOSPITAL | Age: 59
End: 2019-07-03

## 2019-07-23 ENCOUNTER — DOCUMENTATION (OUTPATIENT)
Dept: PHYSICAL THERAPY | Facility: HOSPITAL | Age: 59
End: 2019-07-23

## 2019-07-23 DIAGNOSIS — M70.62 TROCHANTERIC BURSITIS OF BOTH HIPS: Primary | ICD-10-CM

## 2019-07-23 DIAGNOSIS — M70.61 TROCHANTERIC BURSITIS OF BOTH HIPS: Primary | ICD-10-CM

## 2019-07-23 NOTE — THERAPY DISCHARGE NOTE
Outpatient Physical Therapy Discharge Summary         Patient Name: Tyler Doss  : 1960  MRN: [id number]    Today's Date: 2019    Visit Dx:    ICD-10-CM ICD-9-CM   1. Trochanteric bursitis of both hips M70.61 726.5    M70.62        PT OP Goals     Row Name 19 1000          PT Short Term Goals    STG Date to Achieve  19  -CP     STG 1  Patient will report decreased pain rating on VAS from 3-4/10 to 1-2/10 with recreational activities.  -CP     STG 1 Progress  Met  -CP     STG 2  Patient will demonstrate an independent HEP for core and hip strength and flexibility/ROM.  -CP     STG 2 Progress  Met  -CP        Long Term Goals    LTG Date to Achieve  19  -CP     LTG 1  Patient will demonstrate independent HEP progressed for closed chain strength, proprioception, balance and agility with minimal or no compensations or exacerbations.  -CP     LTG 1 Progress  Met  -CP     LTG 2  Patient will negotiate stairs reciprocally with rail support as needed without right lateral hip pain.   -CP     LTG 2 Progress  Met  -CP     LTG 3  Patient will report reduced functional limitations on functional outcome measure by 10 points.  -CP     LTG 3 Progress  Met  -CP     LTG 4  Pt will return to hobby of hiking and using elliptical at gym for 10 min without increased hip pain.   -CP     LTG 4 Progress  Met  -CP       User Key  (r) = Recorded By, (t) = Taken By, (c) = Cosigned By    Initials Name Provider Type    CP Perkins, Corinne E, PT Physical Therapist          OP PT Discharge Summary  Date of Discharge: 19  Reason for Discharge: Independent, All goals achieved  Outcomes Achieved: Able to achieve all goals within established timeline  Discharge Destination: Home with home program  Discharge Instructions/Additional Comments: Patient was seen for PT IE and 3 treatments, indep with HEP and appropriate to discharge from OPPT to home with current HEP.       Time Calculation:                     Corinne E. Perkins, PT  7/23/2019

## 2019-08-01 ENCOUNTER — OFFICE VISIT (OUTPATIENT)
Dept: ORTHOPEDIC SURGERY | Facility: CLINIC | Age: 59
End: 2019-08-01

## 2019-08-01 VITALS — WEIGHT: 207.23 LBS | HEIGHT: 75 IN | OXYGEN SATURATION: 98 % | BODY MASS INDEX: 25.77 KG/M2 | HEART RATE: 88 BPM

## 2019-08-01 DIAGNOSIS — M25.551 BILATERAL HIP PAIN: ICD-10-CM

## 2019-08-01 DIAGNOSIS — M70.62 TROCHANTERIC BURSITIS OF BOTH HIPS: Primary | ICD-10-CM

## 2019-08-01 DIAGNOSIS — M25.552 BILATERAL HIP PAIN: ICD-10-CM

## 2019-08-01 DIAGNOSIS — M70.61 TROCHANTERIC BURSITIS OF BOTH HIPS: Primary | ICD-10-CM

## 2019-08-01 PROCEDURE — 99212 OFFICE O/P EST SF 10 MIN: CPT | Performed by: ORTHOPAEDIC SURGERY

## 2019-08-01 NOTE — PROGRESS NOTES
Orthopaedic Clinic Note: Hip Established Patient    Chief Complaint   Patient presents with   • Follow-up     3 months - Bilateral hip pain and Trochanteric bursitis of both hips        HPI    It has been 3  month(s) since Mr. Doss's last visit. He returns to clinic today for follow-up bilateral hip pain.  He received trochanter bursa injections as well as referral to physical therapy at his last visit.  He is undergone the physical therapy treatment is noted significant improvement in his symptoms.  He denies any limiting pain on a daily basis.  His pain is a dull ache which he describes as a 3/10 on the pain scale localized to the trochanteric bursa.  He is actively exercising and doing all daily activities with no significant limitations.  Overall he is improved.    Past Medical History:   Diagnosis Date   • Anxiety    • Constipation    • Depression    • Eye exam, routine 2011   • GERD (gastroesophageal reflux disease)    • H/O cardiovascular stress test 10/2015    neg   • H/O colonoscopy 08/2015     dilated and slightly tortuos colon   • H/O echocardiogram 10/2015    neg   • H/O x-ray of lumbar spine 02/2015    hypertrophic facet changes l3-s1, significant    • History of dental examination 06/2012   • Prostatitis    • Retinal tear of left eye 06/2018   • Screening PSA (prostate specific antigen) 11/2015    0.3   • Seasonal allergies       Past Surgical History:   Procedure Laterality Date   • CATARACT EXTRACTION Right 11/29/2016   • ENDOSCOPY  09/2014    neg   • EYE SURGERY Left 06/2018    retinal tear      Family History   Problem Relation Age of Onset   • Ovarian cancer Mother 59        thyroid/goiter   • Thyroid disease Mother         goiter   • Heart attack Father 66        chrohn's disease   • Crohn's disease Father    • Diabetes Sister         CVA, hyperlipidemia   • Diabetes type II Sister    • Hypothyroidism Sister    • Stroke Sister 51   • Hyperlipidemia Sister    • Cancer Sister          intestinal   • Thyroid disease Brother         partial thyroidectomy benign   • Hyperlipidemia Brother    • Uterine cancer Paternal Grandmother 40   • Thyroid disease Paternal Grandmother         Mass   • Stroke Paternal Grandfather         before 60   • Heart attack Paternal Uncle         in 70's   • Heart disease Other    • Depression Other    • Prostate cancer Other    • Ovarian cancer Other      Social History     Socioeconomic History   • Marital status:      Spouse name: Not on file   • Number of children: Not on file   • Years of education: Not on file   • Highest education level: Not on file   Tobacco Use   • Smoking status: Never Smoker   • Smokeless tobacco: Never Used   Substance and Sexual Activity   • Alcohol use: Yes     Alcohol/week: 1.8 oz     Types: 3 Standard drinks or equivalent per week     Comment: weekly   • Drug use: No      Current Outpatient Medications on File Prior to Visit   Medication Sig Dispense Refill   • azelastine (ASTELIN) 0.1 % nasal spray 2 sprays each mostril bid 3 each 3   • clonazePAM (KlonoPIN) 0.5 MG tablet 1 qd prn anxiety 90 tablet 0   • omeprazole (priLOSEC) 40 MG capsule TAKE ONE CAPSULE BY MOUTH DAILY 90 capsule 2   • temazepam (RESTORIL) 30 MG capsule Take 1 capsule by mouth At Night As Needed for Sleep. 90 capsule 0     No current facility-administered medications on file prior to visit.       Allergies   Allergen Reactions   • Atorvastatin Myalgia   • Penicillins Rash   • Pravastatin Myalgia        Review of Systems   Constitutional: Positive for activity change.   HENT: Negative.    Eyes: Negative.    Respiratory: Negative.    Cardiovascular: Negative.    Gastrointestinal: Negative.    Endocrine: Negative.    Genitourinary: Negative.    Musculoskeletal: Positive for arthralgias.   Skin: Negative.    Allergic/Immunologic: Negative.    Neurological: Negative.    Hematological: Negative.    Psychiatric/Behavioral: Negative.         The patient's Review of  "Systems was personally reviewed and confirmed as accurate.    Physical Exam  Pulse 88, height 189.5 cm (74.61\"), weight 94 kg (207 lb 3.7 oz), SpO2 98 %.    Body mass index is 26.18 kg/m².    GENERAL APPEARANCE: awake, alert, oriented, in no acute distress and well developed, well nourished  LUNGS:  breathing nonlabored  EXTREMITIES: no clubbing, cyanosis  PERIPHERAL PULSES: palpable dorsalis pedis and posterior tibial pulses bilaterally.    GAIT:  Normal            Hip Exam:  Bilateral    RANGE OF MOTION:  EXTENSION/FLEXION:  normal (0-110 degrees)  IR (at 90 degrees of flexion):  20  ER (at 90 degrees of flexion):  40  PAIN WITH HIP MOTION:  no  PAIN WITH LOGROLL:  no     STINCHFIELD TEST: negative    STRENGTH:  ABDUCTOR:  5/5  ADDUCTOR:  5/5  HIP FLEXION:  5/5    GREATER TROCHANTER BURSAL PAIN:  yes    SENSATION TO LIGHT TOUCH:  DEEP PERONEAL/SUPERFICIAL PERONEAL/SURAL/SAPHENOUS/TIBIAL:   intact    EDEMA:  no  ERYTHEMA:  no  WOUNDS/INCISIONS:  no  _________________________________________________________________  _________________________________________________________________    RADIOGRAPHIC FINDINGS:   No new imaging today    Assessment/Plan:   Diagnosis Plan   1. Trochanteric bursitis of both hips     2. Bilateral hip pain       Patient symptoms have greatly improved with physical therapy and the cortisone injections.  I recommend no further intervention at this time.  He will follow-up as needed.    Wild Lopez MD  08/01/19  8:02 AM  "

## 2019-09-07 NOTE — PROGRESS NOTES
"Subjective   Tyler Doss is a 58 y.o. male.     History of Present Illness     Here for f/u on:    Insomnia - he in on temazepam. He would like to try to go down on the dose next month.  His wife has been using compounded temazepam from [facility] so that it has been easier to titrate.  He would like to try 25 mg dose next month.    R hip trochanteric bursitis is better, but muscles will feel tight around the hip. Has been doing the PT exercises which has helped.  He is wondering if he can use a compounded cream to help with the discomfort    The following portions of the patient's history were reviewed and updated as appropriate: allergies, current medications, past family history, past medical history, past social history, past surgical history and problem list.    Review of Systems   Constitutional: Negative for activity change, appetite change, fever, unexpected weight gain and unexpected weight loss.   Gastrointestinal: Positive for constipation (.  Better using MiraLAX twice daily and taking activita). Negative for abdominal pain, anal bleeding and blood in stool.   Musculoskeletal: Positive for arthralgias.   Allergic/Immunologic: Negative for immunocompromised state.   Psychiatric/Behavioral: Positive for sleep disturbance and stress (, Has an audit coming up at work).       Objective   BP 98/68 (BP Location: Right arm)   Pulse 63   Temp 97.5 °F (36.4 °C) (Temporal)   Ht 189.5 cm (74.6\")   Wt 96.5 kg (212 lb 12.8 oz)   SpO2 98%   BMI 26.88 kg/m²   Physical Exam   Constitutional: He is oriented to person, place, and time. He appears well-developed and well-nourished. No distress.   HENT:   Head: Normocephalic and atraumatic.   Eyes: Conjunctivae and EOM are normal. Pupils are equal, round, and reactive to light. Right eye exhibits no discharge. Left eye exhibits no discharge.   Neck: Normal range of motion. Neck supple.   Cardiovascular: Normal rate and regular rhythm.   Pulmonary/Chest: Effort " normal and breath sounds normal.   Musculoskeletal: He exhibits no edema.   Neurological: He is alert and oriented to person, place, and time. No cranial nerve deficit.   Skin: Skin is warm and dry. No rash noted. He is not diaphoretic.   Psychiatric: He has a normal mood and affect. His behavior is normal. Judgment and thought content normal.   Nursing note and vitals reviewed.        Assessment/Plan   Tyler was seen today for insomnia.    Diagnoses and all orders for this visit:    Primary insomnia  -     temazepam (RESTORIL) 30 MG capsule; Take 1 capsule by mouth At Night As Needed for Sleep.        Pt has already signed controlled substance contract. Hema done today and appropriate. UDS due 6/20.  I sent in just a 30-day to [facility] with no refills.  He will call next month when he needs a refill and we will try 25 mg compounded to [facility]    Flu shot at work    Muscle cream reformulated rx given to pt for the trochanteric bursitis

## 2019-09-09 ENCOUNTER — OFFICE VISIT (OUTPATIENT)
Dept: INTERNAL MEDICINE | Facility: CLINIC | Age: 59
End: 2019-09-09

## 2019-09-09 VITALS
SYSTOLIC BLOOD PRESSURE: 98 MMHG | DIASTOLIC BLOOD PRESSURE: 68 MMHG | OXYGEN SATURATION: 98 % | BODY MASS INDEX: 26.46 KG/M2 | WEIGHT: 212.8 LBS | TEMPERATURE: 97.5 F | HEART RATE: 63 BPM | HEIGHT: 75 IN

## 2019-09-09 DIAGNOSIS — F51.01 PRIMARY INSOMNIA: Primary | ICD-10-CM

## 2019-09-09 PROCEDURE — 99213 OFFICE O/P EST LOW 20 MIN: CPT | Performed by: INTERNAL MEDICINE

## 2019-09-09 RX ORDER — TEMAZEPAM 30 MG/1
30 CAPSULE ORAL NIGHTLY PRN
Qty: 30 CAPSULE | Refills: 0 | Status: SHIPPED | OUTPATIENT
Start: 2019-09-09 | End: 2019-10-21 | Stop reason: SDUPTHER

## 2019-10-21 ENCOUNTER — TELEPHONE (OUTPATIENT)
Dept: INTERNAL MEDICINE | Facility: CLINIC | Age: 59
End: 2019-10-21

## 2019-10-21 DIAGNOSIS — F51.01 PRIMARY INSOMNIA: ICD-10-CM

## 2019-10-21 RX ORDER — TEMAZEPAM 30 MG/1
30 CAPSULE ORAL NIGHTLY PRN
Qty: 30 CAPSULE | Refills: 0 | Status: SHIPPED | OUTPATIENT
Start: 2019-10-21 | End: 2019-12-16 | Stop reason: ALTCHOICE

## 2019-10-21 NOTE — TELEPHONE ENCOUNTER
Could you check and see if he wanted to go down on dose to 25 compunded at [facility]? We had talked about it at last visit

## 2019-11-14 ENCOUNTER — TELEPHONE (OUTPATIENT)
Dept: INTERNAL MEDICINE | Facility: CLINIC | Age: 59
End: 2019-11-14

## 2019-11-14 NOTE — TELEPHONE ENCOUNTER
----- Message from Ya Shafer MD sent at 11/14/2019  7:43 AM EST -----  Regarding: Temazepam  He wants to start weaning the temazepam, and wants to do the compounded lower doses at [facility], like Mandeep is doing.  Can you call in temazepam 25 mg 1 qhs, #30/1  refill to [facility] pharmacy? thanks

## 2019-12-09 ENCOUNTER — TELEPHONE (OUTPATIENT)
Dept: INTERNAL MEDICINE | Facility: CLINIC | Age: 59
End: 2019-12-09

## 2019-12-09 DIAGNOSIS — F51.01 PRIMARY INSOMNIA: ICD-10-CM

## 2019-12-09 RX ORDER — TEMAZEPAM 30 MG/1
30 CAPSULE ORAL NIGHTLY PRN
Qty: 30 CAPSULE | Refills: 0 | Status: CANCELLED | OUTPATIENT
Start: 2019-12-09

## 2019-12-09 NOTE — TELEPHONE ENCOUNTER
Pt needing refill on temazepam, wants to go down to 20 mg    [facility], [place]

## 2019-12-09 NOTE — TELEPHONE ENCOUNTER
Looks like he has appt next week - ok to send in to [facility] the tempazepam 20mg compunded 1 qhs #30/NR

## 2019-12-15 NOTE — PROGRESS NOTES
History of Present Illness   Here for a physical    Diet - healthy generally. Tries to get fruits/veggies, not much fast food. Trying to cut back on sweets too. Centrum daily, D 2/day. Also on baby asa daily, Mg supplement. He has cut back on caffeine as he was having palpitations  Exercise - tries to do something daily - gym 2-3x/d, weights at home, yard work    Insomnia - he in on temazepam, and has been trying to wean over last 2 months. He has gone down from 30 to 25, then to 20mg, [facility], who compounds the lower doses.  He does feel less groggy on the lower doses but is sleeping still pretty well.  He filled the 20 last week.  He would like to stay on the 20 for a few months and see how he does before weaning further    GERD - he would like to try to get off the prilosec. He has tried pepcid but did not work. Occasionally will get a little reflux if he misses the omeprazole, but generally the omeprazole works well.  He would like to try compounded omeprazole at a slightly lower dose and see if he can go down gradually by 5 mg    Current Outpatient Medications on File Prior to Visit   Medication Sig Dispense Refill   • azelastine (ASTELIN) 0.1 % nasal spray 2 sprays each mostril bid 3 each 3   • clonazePAM (KlonoPIN) 0.5 MG tablet 1 qd prn anxiety 90 tablet 0   • omeprazole (priLOSEC) 40 MG capsule TAKE ONE CAPSULE BY MOUTH DAILY 90 capsule 2   • temazepam (RESTORIL) 30 MG capsule Take 1 capsule by mouth At Night As Needed for Sleep. 30 capsule 0     No current facility-administered medications on file prior to visit.        The following portions of the patient's history were reviewed and updated as appropriate: allergies, current medications, past family history, past medical history, past social history, past surgical history and problem list.    Review of Systems   Constitutional: Positive for fatigue. Negative for activity change, appetite change, fever, unexpected weight gain and  "unexpected weight loss.   HENT: Negative.  Negative for trouble swallowing.    Eyes: Negative.    Respiratory: Negative for shortness of breath and wheezing.    Cardiovascular: Positive for palpitations. Negative for chest pain and leg swelling.   Gastrointestinal: Negative.  Positive for GERD. Negative for constipation (using miralax daily which helps).   Endocrine: Negative.    Genitourinary: Negative for difficulty urinating and dysuria.   Skin: Negative.    Allergic/Immunologic: Negative for immunocompromised state.   Neurological: Negative for seizures, speech difficulty, memory problem and confusion.   Hematological: Does not bruise/bleed easily.   Psychiatric/Behavioral: Positive for sleep disturbance (better). Negative for agitation.         Objective   /72 (BP Location: Right arm, Patient Position: Sitting)   Pulse 69   Temp 97.3 °F (36.3 °C) (Temporal)   Ht 189 cm (74.4\")   Wt 96.1 kg (211 lb 12.8 oz)   SpO2 98%   BMI 26.90 kg/m²   Physical Exam   Physical Exam   Constitutional: He is oriented to person, place, and time. He appears well-developed and well-nourished. No distress.   HENT:   Head: Normocephalic and atraumatic.   Right Ear: External ear normal.   Left Ear: External ear normal.   Nose: Nose normal.   Mouth/Throat: Oropharynx is clear and moist. No oropharyngeal exudate.   Eyes: Pupils are equal, round, and reactive to light. Conjunctivae and EOM are normal. Right eye exhibits no discharge. Left eye exhibits no discharge. No scleral icterus.   Neck: Normal range of motion. Neck supple. No thyromegaly present.   Cardiovascular: Normal rate, regular rhythm, normal heart sounds and intact distal pulses.   No murmur heard.  Pulmonary/Chest: Effort normal and breath sounds normal. No stridor. No respiratory distress. He has no wheezes. He has no rales.   Abdominal: Soft. Bowel sounds are normal. He exhibits no distension and no mass. There is no tenderness. There is no rebound and no " guarding.   Genitourinary: Prostate normal.   Musculoskeletal: Normal range of motion. He exhibits no edema or deformity.   Lymphadenopathy:     He has no cervical adenopathy.   Neurological: He is alert and oriented to person, place, and time. He displays normal reflexes. Coordination normal.   Skin: Skin is warm and dry. No rash noted. He is not diaphoretic. No erythema. No pallor.   Psychiatric: He has a normal mood and affect. His behavior is normal. Judgment and thought content normal.   Nursing note and vitals reviewed.      ECG 12 Lead  Date/Time: 12/16/2019 5:59 PM  Performed by: Ya Shafer MD  Authorized by: Ya Shafer MD   Comparison: not compared with previous ECG   Previous ECG: no previous ECG available  Rhythm: sinus rhythm  Rate: normal  ST Segments: ST segments normal  T Waves: T waves normal  QRS axis: indeterminate    Clinical impression: normal ECG            Assessment/Plan   Tyler was seen today for annual exam, insomnia and med refill.    Diagnoses and all orders for this visit:    Routine general medical examination at a health care facility  Regular exercise, healthy diet. Sunscreen use encouraged  DT due '23  Colon due '20-'25  Shingles - shingrex discussed -  can check at pharmacy since we do not have   Hep A - had both  Flu shot - had  We reviewed the latest guidelines and it is Ok to stop the ASA   -     Comprehensive Metabolic Panel; Future  -     CBC & Differential; Future  -     Lipid Panel; Future  -     TSH; Future  -     PSA Screen; Future  -     High Sensitivity CRP; Future  -     Magnesium; Future  -     Vitamin B12; Future  -     Vitamin D 25 Hydroxy; Future  -     POC Urinalysis Dipstick, Automated    Primary insomnia -he is doing well with a slightly lower dose of the temazepam.  We will continue the 20 for a few months.  He will call when he needs a refill sent to [facility].  He does not need a refill today.Pt has already signed controlled substance contract. Hema  done today and appropriate. UDS due 6/20.     Elevated blood sugar -continue working on healthy diet and cutting back on sweets  -     Hemoglobin A1c; Future    Palpitations -EKG looks good today  -     Magnesium; Future  -     ECG 12 Lead    Other fatigue  -     Vitamin B12; Future    Vitamin D deficiency  -     Vitamin D 25 Hydroxy; Future    Gastroesophageal reflux disease without esophagitis-patient preference, we will start trying to decrease the omeprazole.  We will send in 35 mg to [facility] to compound    Other orders  -     azelastine (ASTELIN) 0.1 % nasal spray; 2 sprays each mostril bid

## 2019-12-15 NOTE — PROGRESS NOTES
Subjective   Tyler Doss is a 59 y.o. male.     History of Present Illness     Here for f/u on:    Insomnia - he in on temazepam, and has been trying to wean over last 2 months. He has gone down from 30 to 25, then to 20mg, through [facility], who compounds the lower doses. He filled the 20 last week        Current Outpatient Medications on File Prior to Visit   Medication Sig Dispense Refill   • azelastine (ASTELIN) 0.1 % nasal spray 2 sprays each mostril bid 3 each 3   • clonazePAM (KlonoPIN) 0.5 MG tablet 1 qd prn anxiety 90 tablet 0   • omeprazole (priLOSEC) 40 MG capsule TAKE ONE CAPSULE BY MOUTH DAILY 90 capsule 2   • temazepam (RESTORIL) 30 MG capsule Take 1 capsule by mouth At Night As Needed for Sleep. 30 capsule 0     No current facility-administered medications on file prior to visit.        The following portions of the patient's history were reviewed and updated as appropriate: allergies, current medications, past family history, past medical history, past social history, past surgical history and problem list.    Review of Systems    Objective   There were no vitals taken for this visit.  Physical Exam      Assessment/Plan   {Assess/PlanSmartLinks:76532}    Pt has already signed controlled substance contract. Hema done today and appropriate. UDS due 6/20.       Prev - he had flu vaccine

## 2019-12-16 ENCOUNTER — OFFICE VISIT (OUTPATIENT)
Dept: INTERNAL MEDICINE | Facility: CLINIC | Age: 59
End: 2019-12-16

## 2019-12-16 VITALS
DIASTOLIC BLOOD PRESSURE: 72 MMHG | TEMPERATURE: 97.3 F | HEART RATE: 69 BPM | OXYGEN SATURATION: 98 % | HEIGHT: 74 IN | SYSTOLIC BLOOD PRESSURE: 102 MMHG | WEIGHT: 211.8 LBS | BODY MASS INDEX: 27.18 KG/M2

## 2019-12-16 DIAGNOSIS — K21.9 GASTROESOPHAGEAL REFLUX DISEASE WITHOUT ESOPHAGITIS: ICD-10-CM

## 2019-12-16 DIAGNOSIS — F51.01 PRIMARY INSOMNIA: ICD-10-CM

## 2019-12-16 DIAGNOSIS — R00.2 PALPITATIONS: ICD-10-CM

## 2019-12-16 DIAGNOSIS — R53.83 OTHER FATIGUE: ICD-10-CM

## 2019-12-16 DIAGNOSIS — R73.9 ELEVATED BLOOD SUGAR: ICD-10-CM

## 2019-12-16 DIAGNOSIS — E55.9 VITAMIN D DEFICIENCY: ICD-10-CM

## 2019-12-16 DIAGNOSIS — Z00.00 ROUTINE GENERAL MEDICAL EXAMINATION AT A HEALTH CARE FACILITY: Primary | ICD-10-CM

## 2019-12-16 LAB
BILIRUB BLD-MCNC: NEGATIVE MG/DL
CLARITY, POC: CLEAR
COLOR UR: YELLOW
GLUCOSE UR STRIP-MCNC: NEGATIVE MG/DL
KETONES UR QL: NEGATIVE
LEUKOCYTE EST, POC: NEGATIVE
NITRITE UR-MCNC: NEGATIVE MG/ML
PH UR: 6 [PH] (ref 5–8)
PROT UR STRIP-MCNC: NEGATIVE MG/DL
RBC # UR STRIP: NEGATIVE /UL
SP GR UR: 1.01 (ref 1–1.03)
UROBILINOGEN UR QL: NORMAL

## 2019-12-16 PROCEDURE — 93000 ELECTROCARDIOGRAM COMPLETE: CPT | Performed by: INTERNAL MEDICINE

## 2019-12-16 PROCEDURE — 81003 URINALYSIS AUTO W/O SCOPE: CPT | Performed by: INTERNAL MEDICINE

## 2019-12-16 PROCEDURE — 99396 PREV VISIT EST AGE 40-64: CPT | Performed by: INTERNAL MEDICINE

## 2019-12-16 RX ORDER — OMEPRAZOLE 40 MG/1
40 CAPSULE, DELAYED RELEASE ORAL DAILY
Qty: 90 CAPSULE | Refills: 2 | Status: CANCELLED | OUTPATIENT
Start: 2019-12-16

## 2019-12-16 RX ORDER — AZELASTINE 1 MG/ML
SPRAY, METERED NASAL
Qty: 3 EACH | Refills: 3 | Status: SHIPPED | OUTPATIENT
Start: 2019-12-16 | End: 2021-04-07 | Stop reason: SDUPTHER

## 2019-12-17 ENCOUNTER — TELEPHONE (OUTPATIENT)
Dept: INTERNAL MEDICINE | Facility: CLINIC | Age: 59
End: 2019-12-17

## 2019-12-17 NOTE — TELEPHONE ENCOUNTER
----- Message from Ya Shafer MD sent at 12/16/2019  5:58 PM EST -----  Regarding: omperazole to [facility]  He is going to try to wean omeprazole and use [facility] for compounding.  Continue: Omeprazole 35 mg, 1 every morning # 30 no refills

## 2019-12-26 ENCOUNTER — LAB (OUTPATIENT)
Dept: INTERNAL MEDICINE | Facility: CLINIC | Age: 59
End: 2019-12-26

## 2019-12-26 DIAGNOSIS — R53.83 OTHER FATIGUE: ICD-10-CM

## 2019-12-26 DIAGNOSIS — E55.9 VITAMIN D DEFICIENCY: ICD-10-CM

## 2019-12-26 DIAGNOSIS — R00.2 PALPITATIONS: ICD-10-CM

## 2019-12-26 DIAGNOSIS — Z00.00 ROUTINE GENERAL MEDICAL EXAMINATION AT A HEALTH CARE FACILITY: ICD-10-CM

## 2019-12-26 DIAGNOSIS — R73.9 ELEVATED BLOOD SUGAR: ICD-10-CM

## 2019-12-26 LAB
25(OH)D3 SERPL-MCNC: 47.7 NG/ML (ref 30–100)
ALBUMIN SERPL-MCNC: 4.5 G/DL (ref 3.5–5.2)
ALBUMIN/GLOB SERPL: 1.8 G/DL
ALP SERPL-CCNC: 51 U/L (ref 39–117)
ALT SERPL W P-5'-P-CCNC: 20 U/L (ref 1–41)
ANION GAP SERPL CALCULATED.3IONS-SCNC: 12.2 MMOL/L (ref 5–15)
AST SERPL-CCNC: 18 U/L (ref 1–40)
BASOPHILS # BLD AUTO: 0.05 10*3/MM3 (ref 0–0.2)
BASOPHILS NFR BLD AUTO: 0.9 % (ref 0–1.5)
BILIRUB SERPL-MCNC: 0.3 MG/DL (ref 0.2–1.2)
BUN BLD-MCNC: 12 MG/DL (ref 8–23)
BUN/CREAT SERPL: 13.8 (ref 7–25)
CALCIUM SPEC-SCNC: 9.7 MG/DL (ref 8.6–10.5)
CHLORIDE SERPL-SCNC: 104 MMOL/L (ref 98–107)
CHOLEST SERPL-MCNC: 176 MG/DL (ref 0–200)
CO2 SERPL-SCNC: 24.8 MMOL/L (ref 22–29)
CREAT BLD-MCNC: 0.87 MG/DL (ref 0.76–1.27)
CRP SERPL-MCNC: 0.03 MG/DL (ref 0.01–0.5)
DEPRECATED RDW RBC AUTO: 43.9 FL (ref 37–54)
EOSINOPHIL # BLD AUTO: 0.27 10*3/MM3 (ref 0–0.4)
EOSINOPHIL NFR BLD AUTO: 4.6 % (ref 0.3–6.2)
ERYTHROCYTE [DISTWIDTH] IN BLOOD BY AUTOMATED COUNT: 13.1 % (ref 12.3–15.4)
GFR SERPL CREATININE-BSD FRML MDRD: 89 ML/MIN/1.73
GLOBULIN UR ELPH-MCNC: 2.5 GM/DL
GLUCOSE BLD-MCNC: 90 MG/DL (ref 65–99)
HBA1C MFR BLD: 5.8 % (ref 4.8–5.6)
HCT VFR BLD AUTO: 44.4 % (ref 37.5–51)
HDLC SERPL-MCNC: 39 MG/DL (ref 40–60)
HGB BLD-MCNC: 15.1 G/DL (ref 13–17.7)
IMM GRANULOCYTES # BLD AUTO: 0.01 10*3/MM3 (ref 0–0.05)
IMM GRANULOCYTES NFR BLD AUTO: 0.2 % (ref 0–0.5)
LDLC SERPL CALC-MCNC: 107 MG/DL (ref 0–100)
LDLC/HDLC SERPL: 2.73 {RATIO}
LYMPHOCYTES # BLD AUTO: 1.59 10*3/MM3 (ref 0.7–3.1)
LYMPHOCYTES NFR BLD AUTO: 27.1 % (ref 19.6–45.3)
MAGNESIUM SERPL-MCNC: 2.2 MG/DL (ref 1.6–2.4)
MCH RBC QN AUTO: 31.2 PG (ref 26.6–33)
MCHC RBC AUTO-ENTMCNC: 34 G/DL (ref 31.5–35.7)
MCV RBC AUTO: 91.7 FL (ref 79–97)
MONOCYTES # BLD AUTO: 0.7 10*3/MM3 (ref 0.1–0.9)
MONOCYTES NFR BLD AUTO: 11.9 % (ref 5–12)
NEUTROPHILS # BLD AUTO: 3.24 10*3/MM3 (ref 1.7–7)
NEUTROPHILS NFR BLD AUTO: 55.3 % (ref 42.7–76)
NRBC BLD AUTO-RTO: 0 /100 WBC (ref 0–0.2)
PLATELET # BLD AUTO: 297 10*3/MM3 (ref 140–450)
PMV BLD AUTO: 11.3 FL (ref 6–12)
POTASSIUM BLD-SCNC: 4.8 MMOL/L (ref 3.5–5.2)
PROT SERPL-MCNC: 7 G/DL (ref 6–8.5)
PSA SERPL-MCNC: 0.27 NG/ML (ref 0–4)
RBC # BLD AUTO: 4.84 10*6/MM3 (ref 4.14–5.8)
SODIUM BLD-SCNC: 141 MMOL/L (ref 136–145)
TRIGL SERPL-MCNC: 152 MG/DL (ref 0–150)
TSH SERPL DL<=0.05 MIU/L-ACNC: 4.16 UIU/ML (ref 0.27–4.2)
VIT B12 BLD-MCNC: 1258 PG/ML (ref 211–946)
VLDLC SERPL-MCNC: 30.4 MG/DL (ref 5–40)
WBC NRBC COR # BLD: 5.86 10*3/MM3 (ref 3.4–10.8)

## 2019-12-26 PROCEDURE — 86141 C-REACTIVE PROTEIN HS: CPT | Performed by: INTERNAL MEDICINE

## 2019-12-26 PROCEDURE — 82607 VITAMIN B-12: CPT | Performed by: INTERNAL MEDICINE

## 2019-12-26 PROCEDURE — 80061 LIPID PANEL: CPT | Performed by: INTERNAL MEDICINE

## 2019-12-26 PROCEDURE — 83036 HEMOGLOBIN GLYCOSYLATED A1C: CPT | Performed by: INTERNAL MEDICINE

## 2019-12-26 PROCEDURE — 82306 VITAMIN D 25 HYDROXY: CPT | Performed by: INTERNAL MEDICINE

## 2019-12-26 PROCEDURE — G0103 PSA SCREENING: HCPCS | Performed by: INTERNAL MEDICINE

## 2019-12-26 PROCEDURE — 83735 ASSAY OF MAGNESIUM: CPT | Performed by: INTERNAL MEDICINE

## 2019-12-26 PROCEDURE — 84443 ASSAY THYROID STIM HORMONE: CPT | Performed by: INTERNAL MEDICINE

## 2019-12-26 PROCEDURE — 80053 COMPREHEN METABOLIC PANEL: CPT | Performed by: INTERNAL MEDICINE

## 2019-12-26 PROCEDURE — 85025 COMPLETE CBC W/AUTO DIFF WBC: CPT | Performed by: INTERNAL MEDICINE

## 2019-12-31 RX ORDER — LEVOTHYROXINE SODIUM 0.03 MG/1
25 TABLET ORAL DAILY
Qty: 90 TABLET | Refills: 0 | Status: SHIPPED | OUTPATIENT
Start: 2019-12-31 | End: 2020-03-13 | Stop reason: SDUPTHER

## 2020-01-06 ENCOUNTER — TELEPHONE (OUTPATIENT)
Dept: INTERNAL MEDICINE | Facility: CLINIC | Age: 60
End: 2020-01-06

## 2020-01-06 RX ORDER — TEMAZEPAM 15 MG/1
20 CAPSULE ORAL NIGHTLY
Status: CANCELLED | OUTPATIENT
Start: 2020-01-06

## 2020-01-06 RX ORDER — TEMAZEPAM 15 MG/1
15 CAPSULE ORAL NIGHTLY PRN
Qty: 30 CAPSULE | Refills: 0 | Status: SHIPPED | OUTPATIENT
Start: 2020-01-06 | End: 2020-02-03 | Stop reason: SDUPTHER

## 2020-01-06 NOTE — TELEPHONE ENCOUNTER
PT CALLED IN STATED HE WOULD LIKE A REFILL FOR RX TEMAZEPAM PO BUT HE WOULD LIKE IT REDUCED TO 15MG    PT CB NUMBER: [phone]   [facility]: [address]  FAX# [phone]

## 2020-01-13 ENCOUNTER — TELEPHONE (OUTPATIENT)
Dept: INTERNAL MEDICINE | Facility: CLINIC | Age: 60
End: 2020-01-13

## 2020-01-13 NOTE — TELEPHONE ENCOUNTER
Patient called in to request a reduction on one of his medications and request a return call to discuss.  Please return call and advise.

## 2020-01-24 ENCOUNTER — TELEPHONE (OUTPATIENT)
Dept: INTERNAL MEDICINE | Facility: CLINIC | Age: 60
End: 2020-01-24

## 2020-01-24 NOTE — TELEPHONE ENCOUNTER
Pt called in request med refill and requesting a call back about reducing meds please call back the pt at [phone]

## 2020-01-24 NOTE — TELEPHONE ENCOUNTER
He would like the omeprazole sent in at 30 mg to [facility].  OK to send in?

## 2020-02-03 ENCOUNTER — TELEPHONE (OUTPATIENT)
Dept: INTERNAL MEDICINE | Facility: CLINIC | Age: 60
End: 2020-02-03

## 2020-02-03 DIAGNOSIS — F51.01 PRIMARY INSOMNIA: Primary | ICD-10-CM

## 2020-02-03 RX ORDER — TEMAZEPAM 15 MG/1
15 CAPSULE ORAL NIGHTLY PRN
Qty: 30 CAPSULE | Refills: 0 | Status: CANCELLED | OUTPATIENT
Start: 2020-02-03

## 2020-02-03 RX ORDER — TEMAZEPAM 15 MG/1
15 CAPSULE ORAL NIGHTLY PRN
Qty: 30 CAPSULE | Refills: 0 | Status: SHIPPED | OUTPATIENT
Start: 2020-02-03 | End: 2020-03-02 | Stop reason: SDUPTHER

## 2020-02-03 NOTE — TELEPHONE ENCOUNTER
Requesting refill on:    temazepam (RESTORIL) 15 MG capsule 30 capsule 0 1/6/2020     Sig - Route: Take 1 capsule by mouth At Night As Needed for Sleep. - Oral            [facility] [place] [address] [place] [address] [address] AT [facility] - [phone]  - [phone] FX

## 2020-02-25 ENCOUNTER — TELEPHONE (OUTPATIENT)
Dept: INTERNAL MEDICINE | Facility: CLINIC | Age: 60
End: 2020-02-25

## 2020-02-25 NOTE — TELEPHONE ENCOUNTER
PT CALLED FOR A REFILL ON THE OMEPRAZOLE BUT WOULD LIKE TO HAVE IT SENT IN FOR 25 MG. CURRENTLY ON 30 MG BUT IS GRADUALLY STEPPING DOWN FROM TAKING THE MEDS.

## 2020-03-02 DIAGNOSIS — F51.01 PRIMARY INSOMNIA: ICD-10-CM

## 2020-03-02 RX ORDER — TEMAZEPAM 15 MG/1
15 CAPSULE ORAL NIGHTLY PRN
Qty: 30 CAPSULE | Refills: 0 | Status: SHIPPED | OUTPATIENT
Start: 2020-03-02 | End: 2020-04-06 | Stop reason: SDUPTHER

## 2020-03-13 RX ORDER — LEVOTHYROXINE SODIUM 0.03 MG/1
25 TABLET ORAL DAILY
Qty: 90 TABLET | Refills: 0 | Status: SHIPPED | OUTPATIENT
Start: 2020-03-13 | End: 2020-06-25 | Stop reason: SDUPTHER

## 2020-04-06 ENCOUNTER — OFFICE VISIT (OUTPATIENT)
Dept: INTERNAL MEDICINE | Facility: CLINIC | Age: 60
End: 2020-04-06

## 2020-04-06 DIAGNOSIS — K21.9 GASTROESOPHAGEAL REFLUX DISEASE, ESOPHAGITIS PRESENCE NOT SPECIFIED: ICD-10-CM

## 2020-04-06 DIAGNOSIS — F51.01 PRIMARY INSOMNIA: Primary | ICD-10-CM

## 2020-04-06 DIAGNOSIS — F41.9 ANXIETY: ICD-10-CM

## 2020-04-06 PROCEDURE — 99441 PR PHYS/QHP TELEPHONE EVALUATION 5-10 MIN: CPT | Performed by: INTERNAL MEDICINE

## 2020-04-06 RX ORDER — TEMAZEPAM 15 MG/1
15 CAPSULE ORAL NIGHTLY PRN
Qty: 90 CAPSULE | Refills: 0 | Status: SHIPPED | OUTPATIENT
Start: 2020-04-06 | End: 2020-06-25 | Stop reason: SDUPTHER

## 2020-04-06 RX ORDER — CLONAZEPAM 0.5 MG/1
TABLET ORAL
Qty: 90 TABLET | Refills: 0 | Status: SHIPPED | OUTPATIENT
Start: 2020-04-06 | End: 2020-11-02 | Stop reason: SDUPTHER

## 2020-04-06 NOTE — PROGRESS NOTES
Subjective   Tyler Doss is a 59 y.o. male.     History of Present Illness     This visit was a telephone visit. Total visit time was 8 minutes    Insomnia- he has been taking temazepam, and has been trying to wean and is down to 15mg, and has slept fairly well on this dose.  He would like to leave it at this dose for now.  His wife just found out last week that she had recurrence of her oral cancer, so he anticipates a lot more stress     Anxiety-no panic attacks recently.  Does not use Klonopin very often, but would like to go ahead and get a refill because of above concern    GERD- went down to the otc dose of 20mg (rather than the compounded) and this is working OK. Tries to avoid spicy foods. Trying to eat healthy, eating out less,  hiking a lot, and feels his weight may be just under 200    Hypothyroid - he has felt better w/ the synthroid; energy level better.  Has lost weight intentionally.      Current Outpatient Medications on File Prior to Visit   Medication Sig Dispense Refill   • azelastine (ASTELIN) 0.1 % nasal spray 2 sprays each mostril bid 3 each 3   • clonazePAM (KlonoPIN) 0.5 MG tablet 1 qd prn anxiety 90 tablet 0   • levothyroxine (SYNTHROID) 25 MCG tablet Take 1 tablet by mouth Daily. 90 tablet 0   • Omeprazole powder 25 mg Daily. This is being compounded into a capsule of 25 mg. 30 g 5   • temazepam (RESTORIL) 15 MG capsule Take 1 capsule by mouth At Night As Needed for Sleep. 30 capsule 0     No current facility-administered medications on file prior to visit.        The following portions of the patient's history were reviewed and updated as appropriate: allergies, current medications, past family history, past medical history, past social history, past surgical history and problem list.    Review of Systems   Constitutional: Positive for unexpected weight loss. Negative for fatigue, fever and unexpected weight gain.   Respiratory: Negative for shortness of breath.    Cardiovascular:  Negative for chest pain.   Gastrointestinal: Positive for constipation.   Skin: Negative.    Allergic/Immunologic: Negative for immunocompromised state.   Neurological: Negative for seizures, memory problem and confusion.   Psychiatric/Behavioral: Positive for sleep disturbance and stress. Negative for agitation. The patient is nervous/anxious.        Objective   There were no vitals taken for this visit.  Physical Exam   Constitutional: He is oriented to person, place, and time. No distress.   Neurological: He is alert and oriented to person, place, and time.         Assessment/Plan   Tyler was seen today for insomnia and med refill.    Diagnoses and all orders for this visit:    Primary insomnia-patient doing well with temazepam.  We will continue same dose for now.Pt has already signed controlled substance contract. Hema done today and appropriate. UDS due 6/20  -     temazepam (RESTORIL) 15 MG capsule; Take 1 capsule by mouth At Night As Needed for Sleep.    Anxiety-has used the Klonopin very infrequently in the last few years, but with current situation with his wife's cancer, we will go ahead and refill it in case he needs it  -     clonazePAM (KlonoPIN) 0.5 MG tablet; 1 qd prn anxiety    Gastroesophageal reflux disease, esophagitis presence not specified-patient doing well with 20 mg omeprazole over-the-counter.  Continue healthy lifestyle changes    Hypothyroid- he does feel better with the Synthroid.  He is due for thyroid labs but because of the pandemic we will wait and recheck in June or July at his next f/u

## 2020-06-25 DIAGNOSIS — F51.01 PRIMARY INSOMNIA: ICD-10-CM

## 2020-06-25 RX ORDER — TEMAZEPAM 15 MG/1
15 CAPSULE ORAL NIGHTLY PRN
Qty: 30 CAPSULE | Refills: 0 | Status: SHIPPED | OUTPATIENT
Start: 2020-06-25 | End: 2020-07-29 | Stop reason: SDUPTHER

## 2020-06-25 RX ORDER — LEVOTHYROXINE SODIUM 0.03 MG/1
25 TABLET ORAL DAILY
Qty: 30 TABLET | Refills: 0 | Status: SHIPPED | OUTPATIENT
Start: 2020-06-25 | End: 2020-07-29 | Stop reason: SDUPTHER

## 2020-06-25 NOTE — TELEPHONE ENCOUNTER
PT CALLED TO REQUEST REFILL FOR RX  levothyroxine (SYNTHROID) 25 MCG tablet  AND  temazepam (RESTORIL) 15 MG capsule.    PLEASE ADVISE.  CALL BACK:[phone]       [facility]IN[facility] [address]  [facility] AT [facility]

## 2020-07-06 ENCOUNTER — TELEPHONE (OUTPATIENT)
Dept: INTERNAL MEDICINE | Facility: CLINIC | Age: 60
End: 2020-07-06

## 2020-07-06 RX ORDER — METHOCARBAMOL 500 MG/1
TABLET, FILM COATED ORAL
Qty: 45 TABLET | Refills: 1 | Status: SHIPPED | OUTPATIENT
Start: 2020-07-06 | End: 2020-11-02 | Stop reason: SDUPTHER

## 2020-07-06 NOTE — PROGRESS NOTES
Subjective   Tyler Doss is a 59 y.o. male.     History of Present Illness     This visit was a telephone visit because of the COVID-19 pandemic.  consent for telephone visit obtained.  Total visit time was 11 minutes  Here for f/u on:    Back pain - was walking down stairs yesterday carrying some things and felt like he twisted his back. Feels pain across low back, more on right. No pain down legs. Spasms started yesterday.  Used compoundedcream and the robaxin we sent in yesterday and have helped    Insomnia-he is taking temazepam 15 mg.he is sleeping pretty well overall    Anxiety-uses Klonopin occasionally. Will use 1/2 dose more for leg cramps now, though did take some when his wife had surgery for cancer this spring    GERD-he is taking Prilosec 20 mg daily, OTC    Hypothyroid-he is taking Synthroid and does feel like it has helped some w/ his energy. Weight may be up a little as eating a little more w/ his wife's XRT and people bringing him meals. Has not been going to gym because of covid-19. Trying to stay active w/ yard work though  He is on furlough currently    Current Outpatient Medications on File Prior to Visit   Medication Sig Dispense Refill   • azelastine (ASTELIN) 0.1 % nasal spray 2 sprays each mostril bid 3 each 3   • clonazePAM (KlonoPIN) 0.5 MG tablet 1 qd prn anxiety 90 tablet 0   • levothyroxine (Synthroid) 25 MCG tablet Take 1 tablet by mouth Daily. 30 tablet 0   • Omeprazole powder 25 mg Daily. This is being compounded into a capsule of 25 mg. 30 g 5   • temazepam (RESTORIL) 15 MG capsule Take 1 capsule by mouth At Night As Needed for Sleep. 30 capsule 0     No current facility-administered medications on file prior to visit.        The following portions of the patient's history were reviewed and updated as appropriate: allergies, current medications, past family history, past medical history, past social history, past surgical history and problem list.    Review of Systems    Constitutional: Positive for activity change and unexpected weight gain. Negative for appetite change, fatigue (better), fever and unexpected weight loss.   Gastrointestinal: Positive for constipation (uses miralax daily, daily BM) and GERD.   Musculoskeletal: Positive for back pain and myalgias (leg cramps some).   Allergic/Immunologic: Negative for immunocompromised state.   Psychiatric/Behavioral: Positive for sleep disturbance and stress. The patient is nervous/anxious.        Objective   There were no vitals taken for this visit.  Physical Exam   Gen: alert and oriented, in NAD  Psych: normal mood    Assessment/Plan   Tyler was seen today for insomnia, med refill, anxiety, hypothyroidism and heartburn.    Diagnoses and all orders for this visit:    Primary insomnia-stable with temazepam.  He just picked up a prescription so will not need till next month.  He will call us when he needs this.  -     Urine Drug Screen - Urine, Clean Catch; Future    Anxiety-Klonopin just occasionally.  Does not need a refill yet  -     Urine Drug Screen - Urine, Clean Catch; Future    Gastroesophageal reflux disease, esophagitis presence not specified-stable on over-the-counter Prilosec    High risk medications (not anticoagulants) long-term use  -     Urine Drug Screen - Urine, Clean Catch; Future    Hypothyroidism (acquired)-check thyroid labs.  -     TSH; Future      For the Klonopin and the temazepam, Pt has already signed controlled substance contract. Hema done today and appropriate. UDS due now. He will come in in the next 1-2 weeks for this and the thyroid labs

## 2020-07-06 NOTE — TELEPHONE ENCOUNTER
Patient's wife sent a message stating that Tyler strained his back  yesterday and he is having spasm and would like to try a muscle relaxer.  He does have an appointment tomorrow for his 3-month follow-up.

## 2020-07-07 ENCOUNTER — OFFICE VISIT (OUTPATIENT)
Dept: INTERNAL MEDICINE | Facility: CLINIC | Age: 60
End: 2020-07-07

## 2020-07-07 DIAGNOSIS — Z79.899 HIGH RISK MEDICATIONS (NOT ANTICOAGULANTS) LONG-TERM USE: ICD-10-CM

## 2020-07-07 DIAGNOSIS — F51.01 PRIMARY INSOMNIA: Primary | ICD-10-CM

## 2020-07-07 DIAGNOSIS — F41.9 ANXIETY: ICD-10-CM

## 2020-07-07 DIAGNOSIS — K21.9 GASTROESOPHAGEAL REFLUX DISEASE, ESOPHAGITIS PRESENCE NOT SPECIFIED: ICD-10-CM

## 2020-07-07 DIAGNOSIS — E03.9 HYPOTHYROIDISM (ACQUIRED): ICD-10-CM

## 2020-07-07 PROCEDURE — 99442 PR PHYS/QHP TELEPHONE EVALUATION 11-20 MIN: CPT | Performed by: INTERNAL MEDICINE

## 2020-07-29 ENCOUNTER — LAB (OUTPATIENT)
Dept: LAB | Facility: HOSPITAL | Age: 60
End: 2020-07-29

## 2020-07-29 DIAGNOSIS — F51.01 PRIMARY INSOMNIA: ICD-10-CM

## 2020-07-29 DIAGNOSIS — F41.9 ANXIETY: ICD-10-CM

## 2020-07-29 DIAGNOSIS — E03.9 HYPOTHYROIDISM (ACQUIRED): ICD-10-CM

## 2020-07-29 DIAGNOSIS — Z79.899 HIGH RISK MEDICATIONS (NOT ANTICOAGULANTS) LONG-TERM USE: ICD-10-CM

## 2020-07-29 PROCEDURE — 84443 ASSAY THYROID STIM HORMONE: CPT

## 2020-07-29 PROCEDURE — 80306 DRUG TEST PRSMV INSTRMNT: CPT

## 2020-07-30 LAB — TSH SERPL DL<=0.05 MIU/L-ACNC: 2.47 UIU/ML (ref 0.27–4.2)

## 2020-07-30 RX ORDER — LEVOTHYROXINE SODIUM 0.03 MG/1
25 TABLET ORAL DAILY
Qty: 30 TABLET | Refills: 11 | Status: SHIPPED | OUTPATIENT
Start: 2020-07-30 | End: 2021-06-14

## 2020-07-30 RX ORDER — TEMAZEPAM 15 MG/1
15 CAPSULE ORAL NIGHTLY PRN
Qty: 30 CAPSULE | Refills: 2 | Status: SHIPPED | OUTPATIENT
Start: 2020-07-30 | End: 2020-11-02 | Stop reason: SDUPTHER

## 2020-08-26 ENCOUNTER — TELEPHONE (OUTPATIENT)
Dept: INTERNAL MEDICINE | Facility: CLINIC | Age: 60
End: 2020-08-26

## 2020-08-26 RX ORDER — SODIUM, POTASSIUM,MAG SULFATES 17.5-3.13G
2 SOLUTION, RECONSTITUTED, ORAL ORAL TAKE AS DIRECTED
Qty: 354 ML | Refills: 0 | Status: SHIPPED | OUTPATIENT
Start: 2020-08-26 | End: 2020-11-02

## 2020-08-26 RX ORDER — DOXYCYCLINE HYCLATE 100 MG
TABLET ORAL
Qty: 2 TABLET | Refills: 0 | Status: SHIPPED | OUTPATIENT
Start: 2020-08-26 | End: 2020-11-02

## 2020-08-26 NOTE — TELEPHONE ENCOUNTER
Pt and wife had numerous ticks - 20+There were no vitals filed for this visit. are giving prophylactic dose of doxy

## 2020-08-28 ENCOUNTER — APPOINTMENT (OUTPATIENT)
Dept: PREADMISSION TESTING | Facility: HOSPITAL | Age: 60
End: 2020-08-28

## 2020-08-28 PROCEDURE — U0002 COVID-19 LAB TEST NON-CDC: HCPCS

## 2020-08-28 PROCEDURE — C9803 HOPD COVID-19 SPEC COLLECT: HCPCS

## 2020-08-28 PROCEDURE — U0004 COV-19 TEST NON-CDC HGH THRU: HCPCS

## 2020-08-29 LAB
REF LAB TEST METHOD: NORMAL
SARS-COV-2 RNA RESP QL NAA+PROBE: NOT DETECTED

## 2020-08-31 ENCOUNTER — OUTSIDE FACILITY SERVICE (OUTPATIENT)
Dept: GASTROENTEROLOGY | Facility: CLINIC | Age: 60
End: 2020-08-31

## 2020-08-31 PROCEDURE — 45385 COLONOSCOPY W/LESION REMOVAL: CPT | Performed by: INTERNAL MEDICINE

## 2020-11-01 NOTE — PROGRESS NOTES
Subjective   Tyler Doss is a 60 y.o. male.     History of Present Illness     Here for f/u on:    Insomnia-he is taking temazepam 15 mg nightly. Does hel, though does get up some to go to the bathroom    Anxiety-uses Klonopin occasionally.  Work has been stressful.  His wife is feeling better from her recent GI surgery.  This was last filled in April for 90. Doesn't use a lot but likes to have on hand.  He does tend to make him feel more hung over than the muscle relaxer or the temazepam, so does not like to use often    Leg cramps bilaterally- he uses robaxin nightly and helps. Does take at the same time as the temazepam but he does not feel hung over the next day.  It feels like a combination of a cramp as well as a restless feeling.  Not as severe as a charley horse though.  It is in the entire leg.  Walking a lot at work and he feels like this might worsen the cramps some.  He tries to do the elliptical which helps the cramps but sometimes he is tired from a long day at work and does not have time to do the elliptical.  He also tries to do some weights which also seems to help  He feels like he drinks plenty and does try to stretch out legs before he goes to bed.    Hypothyroid - on synthroid and tsh looked good in 7/20.  He does feel like energy is better with this    Chronic constipation-he has been taking Linzess and has made a difference.  Bowel movements are more regular.  He uses the laxative still intermittently but backs off sometimes stool is too loose.  He is tolerating the Linzess    Current Outpatient Medications on File Prior to Visit   Medication Sig Dispense Refill   • azelastine (ASTELIN) 0.1 % nasal spray 2 sprays each mostril bid 3 each 3   • clonazePAM (KlonoPIN) 0.5 MG tablet 1 qd prn anxiety 90 tablet 0   • doxycycline (VIBRAMYICN) 100 MG tablet 2 tabs once 2 tablet 0   • levothyroxine (Synthroid) 25 MCG tablet Take 1 tablet by mouth Daily. 30 tablet 11   • linaclotide (LINZESS) 290  "MCG capsule capsule Take 1 capsule by mouth Every Morning Before Breakfast. At least 30 minutes before breakfast 30 capsule 11   • linaclotide (Linzess) 290 MCG capsule capsule Take 1 capsule by mouth Every Morning Before Breakfast. Wait At least 1 hour  before eating breakfast 30 capsule 11   • methocarbamol (Robaxin) 500 MG tablet 1 tid prn back spasms 45 tablet 1   • Omeprazole powder 25 mg Daily. This is being compounded into a capsule of 25 mg. 30 g 5   • sodium-potassium-magnesium sulfates (Suprep Bowel Prep Kit) 17.5-3.13-1.6 GM/177ML solution oral solution Take 2 bottles by mouth Take As Directed. Do Not Eat The Day Before Your Procedure. Call [phone] for questions. 354 mL 0   • temazepam (RESTORIL) 15 MG capsule Take 1 capsule by mouth At Night As Needed for Sleep. 30 capsule 2   • Unable to find 1 each Daily. Omeprazole 25 mg coumpounded.       No current facility-administered medications on file prior to visit.        The following portions of the patient's history were reviewed and updated as appropriate: allergies, current medications, past family history, past medical history, past social history, past surgical history and problem list.    Review of Systems   Constitutional: Negative for activity change, appetite change, unexpected weight gain and unexpected weight loss.   Gastrointestinal: Positive for constipation (.  Better with Linzess). Negative for diarrhea.   Musculoskeletal: Positive for myalgias. Negative for gait problem and joint swelling.   Allergic/Immunologic: Negative for immunocompromised state.   Neurological: Negative for seizures, speech difficulty and memory problem.   Psychiatric/Behavioral: Positive for sleep disturbance and stress. Negative for dysphoric mood, suicidal ideas and depressed mood. The patient is nervous/anxious.        Objective   /76 (BP Location: Left arm, Patient Position: Sitting)   Pulse 95   Temp 97.1 °F (36.2 °C) (Infrared)   Ht 189 cm (74.4\")  "  Wt 95.3 kg (210 lb 1.6 oz)   SpO2 99%   BMI 26.69 kg/m²   Physical Exam  Constitutional:       General: He is not in acute distress.     Appearance: Normal appearance. He is well-developed. He is not diaphoretic.   HENT:      Head: Normocephalic and atraumatic.   Eyes:      Conjunctiva/sclera: Conjunctivae normal.   Cardiovascular:      Rate and Rhythm: Normal rate and regular rhythm.      Heart sounds: Normal heart sounds. No murmur. No friction rub. No gallop.    Pulmonary:      Effort: Pulmonary effort is normal. No respiratory distress.      Breath sounds: Normal breath sounds. No wheezing.   Musculoskeletal:         General: No swelling or deformity.      Right lower leg: No edema.      Left lower leg: No edema.   Skin:     General: Skin is warm and dry.   Neurological:      Mental Status: He is alert and oriented to person, place, and time. Mental status is at baseline.   Psychiatric:         Behavior: Behavior normal.         Thought Content: Thought content normal.         Judgment: Judgment normal.           Assessment/Plan   Diagnoses and all orders for this visit:    1. Primary insomnia (Primary)-fair control with temazepam.  -     temazepam (RESTORIL) 15 MG capsule; Take 1 capsule by mouth At Night As Needed for Sleep.  Dispense: 30 capsule; Refill: 2    2. Anxiety-uses Klonopin infrequently.  -     clonazePAM (KlonoPIN) 0.5 MG tablet; 1 qd prn anxiety  Dispense: 90 tablet; Refill: 0    3. Acquired hypothyroidism-we will plan to recheck in 3 months    4. Leg cramps-Robaxin is helping.  We discussed potential for oversedation taking with the temazepam but so far patient is tolerating.  -     methocarbamol (Robaxin) 500 MG tablet; 1 tid prn back spasms  Dispense: 45 tablet; Refill: 3        For the Klonopin and the temazepam, Pt has already signed controlled substance contract. Hema done today and appropriate. UDS due 7/21    Prev:  Colon done in 8/20- repeat 8/25  Physical - schedule  Flu -patient  has had

## 2020-11-02 ENCOUNTER — OFFICE VISIT (OUTPATIENT)
Dept: INTERNAL MEDICINE | Facility: CLINIC | Age: 60
End: 2020-11-02

## 2020-11-02 VITALS
BODY MASS INDEX: 26.96 KG/M2 | WEIGHT: 210.1 LBS | OXYGEN SATURATION: 99 % | DIASTOLIC BLOOD PRESSURE: 76 MMHG | TEMPERATURE: 97.1 F | SYSTOLIC BLOOD PRESSURE: 108 MMHG | HEART RATE: 95 BPM | HEIGHT: 74 IN

## 2020-11-02 DIAGNOSIS — R25.2 LEG CRAMPS: ICD-10-CM

## 2020-11-02 DIAGNOSIS — F51.01 PRIMARY INSOMNIA: Primary | ICD-10-CM

## 2020-11-02 DIAGNOSIS — E03.9 ACQUIRED HYPOTHYROIDISM: ICD-10-CM

## 2020-11-02 DIAGNOSIS — F41.9 ANXIETY: ICD-10-CM

## 2020-11-02 PROCEDURE — 99213 OFFICE O/P EST LOW 20 MIN: CPT | Performed by: INTERNAL MEDICINE

## 2020-11-02 RX ORDER — CLONAZEPAM 0.5 MG/1
TABLET ORAL
Qty: 90 TABLET | Refills: 0 | Status: SHIPPED | OUTPATIENT
Start: 2020-11-02 | End: 2021-05-03 | Stop reason: SDUPTHER

## 2020-11-02 RX ORDER — METHOCARBAMOL 500 MG/1
TABLET, FILM COATED ORAL
Qty: 45 TABLET | Refills: 3 | Status: SHIPPED | OUTPATIENT
Start: 2020-11-02 | End: 2022-03-04

## 2020-11-02 RX ORDER — TEMAZEPAM 15 MG/1
15 CAPSULE ORAL NIGHTLY PRN
Qty: 30 CAPSULE | Refills: 2 | Status: SHIPPED | OUTPATIENT
Start: 2020-11-02 | End: 2021-02-01 | Stop reason: SDUPTHER

## 2020-11-02 RX ORDER — OMEPRAZOLE 20 MG/1
20 CAPSULE, DELAYED RELEASE ORAL DAILY
COMMUNITY

## 2021-02-01 DIAGNOSIS — F51.01 PRIMARY INSOMNIA: ICD-10-CM

## 2021-02-01 RX ORDER — TEMAZEPAM 15 MG/1
15 CAPSULE ORAL NIGHTLY PRN
Qty: 30 CAPSULE | Refills: 0 | Status: SHIPPED | OUTPATIENT
Start: 2021-02-01 | End: 2021-03-01 | Stop reason: SDUPTHER

## 2021-02-04 ENCOUNTER — OFFICE VISIT (OUTPATIENT)
Dept: INTERNAL MEDICINE | Facility: CLINIC | Age: 61
End: 2021-02-04

## 2021-02-04 VITALS
RESPIRATION RATE: 18 BRPM | OXYGEN SATURATION: 99 % | HEART RATE: 78 BPM | DIASTOLIC BLOOD PRESSURE: 86 MMHG | HEIGHT: 75 IN | SYSTOLIC BLOOD PRESSURE: 122 MMHG | TEMPERATURE: 96.9 F | BODY MASS INDEX: 25.59 KG/M2 | WEIGHT: 205.8 LBS

## 2021-02-04 DIAGNOSIS — E55.9 VITAMIN D DEFICIENCY: ICD-10-CM

## 2021-02-04 DIAGNOSIS — R06.02 SOB (SHORTNESS OF BREATH): ICD-10-CM

## 2021-02-04 DIAGNOSIS — D80.2 IGA DEFICIENCY (HCC): ICD-10-CM

## 2021-02-04 DIAGNOSIS — R25.2 LEG CRAMPS: ICD-10-CM

## 2021-02-04 DIAGNOSIS — R73.09 ELEVATED GLUCOSE: ICD-10-CM

## 2021-02-04 DIAGNOSIS — Z00.00 ROUTINE GENERAL MEDICAL EXAMINATION AT A HEALTH CARE FACILITY: Primary | ICD-10-CM

## 2021-02-04 DIAGNOSIS — Z12.5 PROSTATE CANCER SCREENING: ICD-10-CM

## 2021-02-04 PROCEDURE — 99396 PREV VISIT EST AGE 40-64: CPT | Performed by: INTERNAL MEDICINE

## 2021-02-04 RX ORDER — GABAPENTIN 100 MG/1
CAPSULE ORAL
Qty: 30 CAPSULE | Refills: 5 | Status: SHIPPED | OUTPATIENT
Start: 2021-02-04 | End: 2021-05-03 | Stop reason: SDUPTHER

## 2021-02-04 RX ORDER — ALBUTEROL SULFATE 90 UG/1
2 AEROSOL, METERED RESPIRATORY (INHALATION) EVERY 4 HOURS PRN
Qty: 18 G | Refills: 1 | Status: SHIPPED | OUTPATIENT
Start: 2021-02-04 | End: 2022-03-04

## 2021-02-04 RX ORDER — FLUTICASONE PROPIONATE 50 MCG
2 SPRAY, SUSPENSION (ML) NASAL DAILY
COMMUNITY

## 2021-02-05 ENCOUNTER — LAB (OUTPATIENT)
Dept: LAB | Facility: HOSPITAL | Age: 61
End: 2021-02-05

## 2021-02-05 DIAGNOSIS — E55.9 VITAMIN D DEFICIENCY: ICD-10-CM

## 2021-02-05 DIAGNOSIS — Z12.5 PROSTATE CANCER SCREENING: ICD-10-CM

## 2021-02-05 DIAGNOSIS — R73.09 ELEVATED GLUCOSE: ICD-10-CM

## 2021-02-05 DIAGNOSIS — D80.2 IGA DEFICIENCY (HCC): ICD-10-CM

## 2021-02-05 DIAGNOSIS — Z00.00 ROUTINE GENERAL MEDICAL EXAMINATION AT A HEALTH CARE FACILITY: ICD-10-CM

## 2021-02-05 LAB
25(OH)D3 SERPL-MCNC: 56.8 NG/ML (ref 30–100)
ALBUMIN SERPL-MCNC: 4.6 G/DL (ref 3.5–5.2)
ALBUMIN/GLOB SERPL: 1.8 G/DL
ALP SERPL-CCNC: 52 U/L (ref 39–117)
ALT SERPL W P-5'-P-CCNC: 29 U/L (ref 1–41)
ANION GAP SERPL CALCULATED.3IONS-SCNC: 9.1 MMOL/L (ref 5–15)
AST SERPL-CCNC: 27 U/L (ref 1–40)
BASOPHILS # BLD AUTO: 0.03 10*3/MM3 (ref 0–0.2)
BASOPHILS NFR BLD AUTO: 0.5 % (ref 0–1.5)
BILIRUB SERPL-MCNC: 0.6 MG/DL (ref 0–1.2)
BUN SERPL-MCNC: 12 MG/DL (ref 8–23)
BUN/CREAT SERPL: 12.2 (ref 7–25)
CALCIUM SPEC-SCNC: 10.2 MG/DL (ref 8.6–10.5)
CHLORIDE SERPL-SCNC: 100 MMOL/L (ref 98–107)
CHOLEST SERPL-MCNC: 186 MG/DL (ref 0–200)
CO2 SERPL-SCNC: 28.9 MMOL/L (ref 22–29)
CREAT SERPL-MCNC: 0.98 MG/DL (ref 0.76–1.27)
DEPRECATED RDW RBC AUTO: 42.5 FL (ref 37–54)
EOSINOPHIL # BLD AUTO: 0.18 10*3/MM3 (ref 0–0.4)
EOSINOPHIL NFR BLD AUTO: 3 % (ref 0.3–6.2)
ERYTHROCYTE [DISTWIDTH] IN BLOOD BY AUTOMATED COUNT: 12.7 % (ref 12.3–15.4)
GFR SERPL CREATININE-BSD FRML MDRD: 78 ML/MIN/1.73
GLOBULIN UR ELPH-MCNC: 2.6 GM/DL
GLUCOSE SERPL-MCNC: 83 MG/DL (ref 65–99)
HBA1C MFR BLD: 5.6 % (ref 4.8–5.6)
HCT VFR BLD AUTO: 47.8 % (ref 37.5–51)
HDLC SERPL-MCNC: 41 MG/DL (ref 40–60)
HGB BLD-MCNC: 15.9 G/DL (ref 13–17.7)
IGA1 MFR SER: 70 MG/DL (ref 70–400)
IMM GRANULOCYTES # BLD AUTO: 0.02 10*3/MM3 (ref 0–0.05)
IMM GRANULOCYTES NFR BLD AUTO: 0.3 % (ref 0–0.5)
LDLC SERPL CALC-MCNC: 118 MG/DL (ref 0–100)
LDLC/HDLC SERPL: 2.81 {RATIO}
LYMPHOCYTES # BLD AUTO: 1.55 10*3/MM3 (ref 0.7–3.1)
LYMPHOCYTES NFR BLD AUTO: 26.1 % (ref 19.6–45.3)
MCH RBC QN AUTO: 30.8 PG (ref 26.6–33)
MCHC RBC AUTO-ENTMCNC: 33.3 G/DL (ref 31.5–35.7)
MCV RBC AUTO: 92.5 FL (ref 79–97)
MONOCYTES # BLD AUTO: 0.72 10*3/MM3 (ref 0.1–0.9)
MONOCYTES NFR BLD AUTO: 12.1 % (ref 5–12)
NEUTROPHILS NFR BLD AUTO: 3.44 10*3/MM3 (ref 1.7–7)
NEUTROPHILS NFR BLD AUTO: 58 % (ref 42.7–76)
NRBC BLD AUTO-RTO: 0 /100 WBC (ref 0–0.2)
PLATELET # BLD AUTO: 291 10*3/MM3 (ref 140–450)
PMV BLD AUTO: 11.8 FL (ref 6–12)
POTASSIUM SERPL-SCNC: 4.4 MMOL/L (ref 3.5–5.2)
PROT SERPL-MCNC: 7.2 G/DL (ref 6–8.5)
PSA SERPL-MCNC: 0.33 NG/ML (ref 0–4)
RBC # BLD AUTO: 5.17 10*6/MM3 (ref 4.14–5.8)
SODIUM SERPL-SCNC: 138 MMOL/L (ref 136–145)
TRIGL SERPL-MCNC: 149 MG/DL (ref 0–150)
TSH SERPL DL<=0.05 MIU/L-ACNC: 3.36 UIU/ML (ref 0.27–4.2)
VLDLC SERPL-MCNC: 27 MG/DL (ref 5–40)
WBC # BLD AUTO: 5.94 10*3/MM3 (ref 3.4–10.8)

## 2021-02-05 PROCEDURE — 82306 VITAMIN D 25 HYDROXY: CPT | Performed by: INTERNAL MEDICINE

## 2021-02-05 PROCEDURE — 80053 COMPREHEN METABOLIC PANEL: CPT | Performed by: INTERNAL MEDICINE

## 2021-02-05 PROCEDURE — 80061 LIPID PANEL: CPT | Performed by: INTERNAL MEDICINE

## 2021-02-05 PROCEDURE — 36415 COLL VENOUS BLD VENIPUNCTURE: CPT

## 2021-02-05 PROCEDURE — G0103 PSA SCREENING: HCPCS | Performed by: INTERNAL MEDICINE

## 2021-02-05 PROCEDURE — 83036 HEMOGLOBIN GLYCOSYLATED A1C: CPT | Performed by: INTERNAL MEDICINE

## 2021-02-05 PROCEDURE — 84443 ASSAY THYROID STIM HORMONE: CPT | Performed by: INTERNAL MEDICINE

## 2021-02-05 PROCEDURE — 82784 ASSAY IGA/IGD/IGG/IGM EACH: CPT | Performed by: INTERNAL MEDICINE

## 2021-02-05 PROCEDURE — 85025 COMPLETE CBC W/AUTO DIFF WBC: CPT | Performed by: INTERNAL MEDICINE

## 2021-03-01 DIAGNOSIS — F51.01 PRIMARY INSOMNIA: ICD-10-CM

## 2021-03-01 RX ORDER — TEMAZEPAM 15 MG/1
15 CAPSULE ORAL NIGHTLY PRN
Qty: 30 CAPSULE | Refills: 2 | Status: SHIPPED | OUTPATIENT
Start: 2021-03-01 | End: 2021-05-03 | Stop reason: SDUPTHER

## 2021-03-01 NOTE — TELEPHONE ENCOUNTER
Last Office Visit: 02/04/2021  Next Office Visit: 05/03/2021    Labs completed in past 6 months? yes  Labs completed in past year? yes    Last Refill Date: 02/01/2021  Quantity: 30  Refills: 0    Pharmacy:  [facility]IN[facility] ESPINOZA [address] [place] [address] [facility] SNE[facility] AT [facility] - [phone] [facility] [phone] FX

## 2021-04-07 RX ORDER — AZELASTINE 1 MG/ML
SPRAY, METERED NASAL
Qty: 90 EACH | Refills: 2 | OUTPATIENT
Start: 2021-04-07

## 2021-04-07 RX ORDER — AZELASTINE 1 MG/ML
SPRAY, METERED NASAL
Qty: 3 EACH | Refills: 3 | Status: SHIPPED | OUTPATIENT
Start: 2021-04-07 | End: 2022-04-25 | Stop reason: SDUPTHER

## 2021-05-03 ENCOUNTER — OFFICE VISIT (OUTPATIENT)
Dept: INTERNAL MEDICINE | Facility: CLINIC | Age: 61
End: 2021-05-03

## 2021-05-03 VITALS
WEIGHT: 206.6 LBS | HEIGHT: 75 IN | DIASTOLIC BLOOD PRESSURE: 78 MMHG | TEMPERATURE: 96.9 F | SYSTOLIC BLOOD PRESSURE: 118 MMHG | HEART RATE: 84 BPM | BODY MASS INDEX: 25.69 KG/M2 | OXYGEN SATURATION: 99 %

## 2021-05-03 DIAGNOSIS — F41.9 ANXIETY: ICD-10-CM

## 2021-05-03 DIAGNOSIS — E03.9 HYPOTHYROIDISM (ACQUIRED): Chronic | ICD-10-CM

## 2021-05-03 DIAGNOSIS — G25.81 RESTLESS LEGS SYNDROME: ICD-10-CM

## 2021-05-03 DIAGNOSIS — K59.09 CHRONIC CONSTIPATION: Chronic | ICD-10-CM

## 2021-05-03 DIAGNOSIS — F51.01 PRIMARY INSOMNIA: Primary | Chronic | ICD-10-CM

## 2021-05-03 PROBLEM — R25.2 LEG CRAMPS: Chronic | Status: ACTIVE | Noted: 2021-05-03

## 2021-05-03 PROBLEM — R25.2 LEG CRAMPS: Status: ACTIVE | Noted: 2021-05-03

## 2021-05-03 PROCEDURE — 99214 OFFICE O/P EST MOD 30 MIN: CPT | Performed by: INTERNAL MEDICINE

## 2021-05-03 RX ORDER — TEMAZEPAM 15 MG/1
15 CAPSULE ORAL NIGHTLY PRN
Qty: 30 CAPSULE | Refills: 2 | Status: SHIPPED | OUTPATIENT
Start: 2021-05-03 | End: 2021-08-02 | Stop reason: SDUPTHER

## 2021-05-03 RX ORDER — CLONAZEPAM 0.5 MG/1
TABLET ORAL
Qty: 90 TABLET | Refills: 0 | Status: SHIPPED | OUTPATIENT
Start: 2021-05-03 | End: 2021-08-02 | Stop reason: SDUPTHER

## 2021-05-03 RX ORDER — GABAPENTIN 100 MG/1
CAPSULE ORAL
Qty: 60 CAPSULE | Refills: 5 | Status: SHIPPED | OUTPATIENT
Start: 2021-05-03 | End: 2021-08-02

## 2021-05-03 NOTE — PROGRESS NOTES
Chief Complaint  Insomnia, Hypothyroidism, Restless Legs Syndrome, and Med Refill    Subjective          Tyler Doss presents to [facility]  History of Present Illness    Insomnia-he is on temazepam 15 mg and this is working well generally    Restless leg syndrome -not as much leg cramps recently as the restless feeling.  He has used Klonopin occasionally in addition to the temazepam to help with the restless legs, but if he takes a full dose he feels very groggy the next day so he did end up restarting the gabapentin 100 mg, and does better with this.  He does not feel hung over the next day.  He still get restless legs occasionally but not every night.  Going to gym again and doing stretches throughout the day helps a lot as well.  Wears compression socks at night, which also helps  Has not use the muscle relaxer recently    Anxiety -doing well, has retired.  He will use Klonopin still just occasionally so would like to have some on hand though he does not take often because of the grogginess with it    Hypothyroid-his last TSH was 3.3 done 3 months ago.  He is on 25 mcg. He feels well. Energy good overall    Constipation -doing well-he takes Linzess and miralax daily and has been going daily    Current Outpatient Medications:   •  albuterol sulfate  (90 Base) MCG/ACT inhaler, Inhale 2 puffs Every 4 (Four) Hours As Needed for Wheezing., Disp: 18 g, Rfl: 1  •  azelastine (Astelin) 0.1 % nasal spray, 2 sprays each mostril bid, Disp: 3 each, Rfl: 3  •  clonazePAM (KlonoPIN) 0.5 MG tablet, 1 qd prn anxiety, Disp: 90 tablet, Rfl: 0  •  fluticasone (FLONASE) 50 MCG/ACT nasal spray, 2 sprays into the nostril(s) as directed by provider Daily., Disp: , Rfl:   •  gabapentin (NEURONTIN) 100 MG capsule, 1-2 qhs, Disp: 60 capsule, Rfl: 5  •  levothyroxine (Synthroid) 25 MCG tablet, Take 1 tablet by mouth Daily., Disp: 30 tablet, Rfl: 11  •  linaclotide (Linzess) 290 MCG capsule  "capsule, Take 1 capsule by mouth Every Morning Before Breakfast. Wait At least 1 hour  before eating breakfast, Disp: 30 capsule, Rfl: 11  •  methocarbamol (Robaxin) 500 MG tablet, 1 tid prn back spasms, Disp: 45 tablet, Rfl: 3  •  omeprazole (priLOSEC) 20 MG capsule, Take 20 mg by mouth Daily., Disp: , Rfl:   •  temazepam (RESTORIL) 15 MG capsule, Take 1 capsule by mouth At Night As Needed for Sleep., Disp: 30 capsule, Rfl: 2      Objective   Vital Signs:   /78 (BP Location: Left arm, Patient Position: Sitting)   Pulse 84   Temp 96.9 °F (36.1 °C) (Infrared)   Ht 189.2 cm (74.5\")   Wt 93.7 kg (206 lb 9.6 oz)   SpO2 99%   BMI 26.17 kg/m²       Physical exam  Constitutional: oriented to person, place, and time.  well-developed and well-nourished. No distress.   HENT:   Head: Normocephalic and atraumatic.   Eyes: Conjunctivae and EOM are normal.   Cardiovascular: Normal rate, regular rhythm and normal heart sounds.  Exam reveals no gallop and no friction rub.    No murmur heard.  Pulmonary/Chest: Effort normal and breath sounds normal. No respiratory distress.   no wheezes.   Neurological:  alert and oriented to person, place, and time.   Skin: Skin is warm and dry. not diaphoretic.   Psychiatric:  normal mood and affect. behavior is normal. Judgment and thought content normal.      Result Review :   The following data was reviewed by: Ya Shafer MD on 05/03/2021:  Common labs    Common Labsle 2/5/21 2/5/21 2/5/21 2/5/21 2/5/21    0810 0810 0810 0810 0810   Glucose  83      BUN  12      Creatinine  0.98      eGFR Non African Am  78      Sodium  138      Potassium  4.4      Chloride  100      Calcium  10.2      Albumin  4.60      Total Bilirubin  0.6      Alkaline Phosphatase  52      AST (SGOT)  27      ALT (SGPT)  29      WBC 5.94       Hemoglobin 15.9       Hematocrit 47.8       Platelets 291       Total Cholesterol   186     Triglycerides   149     HDL Cholesterol   41     LDL Cholesterol    118 (A) "     Hemoglobin A1C    5.60    PSA     0.327   (A) Abnormal value            TSH    TSH 7/29/20 2/5/21   TSH 2.470 3.360           Last Urine Toxicity     LAST URINE TOXICITY RESULTS Latest Ref Rng & Units 7/29/2020 6/5/2019    AMPHETAMINES SCREEN, URINE Negative Negative Negative    BARBITURATES SCREEN Negative Negative Negative    BENZODIAZEPINE SCREEN, URINE Negative Positive(A) Positive(A)    BUPRENORPHINEUR Negative Negative Negative    COCAINE SCREEN, URINE Negative Negative Negative    METHADONE SCREEN, URINE Negative Negative Negative    METHAMPHETAMINEUR Negative Negative Negative                  Assessment and Plan    Diagnoses and all orders for this visit:    1. Primary insomnia (Primary)  Comments:  Stable with temazepam.  CSC signed today.  Hema appropriate.  UDS due 7/21  Orders:  -     temazepam (RESTORIL) 15 MG capsule; Take 1 capsule by mouth At Night As Needed for Sleep.  Dispense: 30 capsule; Refill: 2    2. Hypothyroidism (acquired)  Comments:  Recheck with next blood work    3. Anxiety  Comments:  Uses Klonopin just occasionally.  He would like to go ahead and refill  Orders:  -     clonazePAM (KlonoPIN) 0.5 MG tablet; 1 qd prn anxiety  Dispense: 90 tablet; Refill: 0    4. Restless legs syndrome  Comments:  We will raise gabapentin to 1-2 capsules at night as needed  Orders:  -     gabapentin (NEURONTIN) 100 MG capsule; 1-2 qhs  Dispense: 60 capsule; Refill: 5    5. Chronic constipation  Comments:  Doing well with Linzess and MiraLAX        Follow Up   Return in about 3 months (around 8/3/2021) for Next scheduled follow up.  Patient was given instructions and counseling regarding his condition or for health maintenance advice. Please see specific information pulled into the AVS if appropriate.

## 2021-05-13 ENCOUNTER — TELEPHONE (OUTPATIENT)
Dept: INTERNAL MEDICINE | Facility: CLINIC | Age: 61
End: 2021-05-13

## 2021-05-13 RX ORDER — DOXYCYCLINE HYCLATE 100 MG/1
CAPSULE ORAL
Qty: 2 CAPSULE | Refills: 0 | Status: SHIPPED | OUTPATIENT
Start: 2021-05-13 | End: 2021-08-02

## 2021-05-13 NOTE — TELEPHONE ENCOUNTER
Patient's wife sent message yesterday that patient found a tick on his scrotum that had probably been there for several days. it was a deer tick.  I will go ahead and send in prophylactic dose of doxycycline

## 2021-05-17 DIAGNOSIS — G25.81 RESTLESS LEGS SYNDROME: ICD-10-CM

## 2021-05-17 RX ORDER — GABAPENTIN 100 MG/1
CAPSULE ORAL
Qty: 60 CAPSULE | Refills: 5 | Status: CANCELLED | OUTPATIENT
Start: 2021-05-17

## 2021-05-17 NOTE — TELEPHONE ENCOUNTER
Spoke with patient and gabapentin has been sent to the pharmacy.  He states the pharmacy won't fill.  I contacted the pharmacy and they needs his insurance information.  The insurance they have has been terminated.  I let the patient know this and he will contact the pharmacy.

## 2021-05-30 DIAGNOSIS — F51.01 PRIMARY INSOMNIA: Chronic | ICD-10-CM

## 2021-06-01 RX ORDER — TEMAZEPAM 15 MG/1
15 CAPSULE ORAL NIGHTLY PRN
Qty: 30 CAPSULE | Refills: 2 | OUTPATIENT
Start: 2021-06-01

## 2021-06-14 RX ORDER — LEVOTHYROXINE SODIUM 0.03 MG/1
TABLET ORAL
Qty: 90 TABLET | Refills: 0 | Status: SHIPPED | OUTPATIENT
Start: 2021-06-14 | End: 2021-09-08

## 2021-07-12 RX ORDER — LEVOTHYROXINE SODIUM 0.03 MG/1
25 TABLET ORAL DAILY
Qty: 90 TABLET | Refills: 0 | OUTPATIENT
Start: 2021-07-12

## 2021-08-02 ENCOUNTER — OFFICE VISIT (OUTPATIENT)
Dept: INTERNAL MEDICINE | Facility: CLINIC | Age: 61
End: 2021-08-02

## 2021-08-02 ENCOUNTER — LAB (OUTPATIENT)
Dept: LAB | Facility: HOSPITAL | Age: 61
End: 2021-08-02

## 2021-08-02 ENCOUNTER — TELEPHONE (OUTPATIENT)
Dept: INTERNAL MEDICINE | Facility: CLINIC | Age: 61
End: 2021-08-02

## 2021-08-02 VITALS
OXYGEN SATURATION: 99 % | BODY MASS INDEX: 25.51 KG/M2 | SYSTOLIC BLOOD PRESSURE: 112 MMHG | DIASTOLIC BLOOD PRESSURE: 76 MMHG | WEIGHT: 205.2 LBS | HEART RATE: 61 BPM | HEIGHT: 75 IN | TEMPERATURE: 97.1 F

## 2021-08-02 DIAGNOSIS — G25.81 RLS (RESTLESS LEGS SYNDROME): Chronic | ICD-10-CM

## 2021-08-02 DIAGNOSIS — E03.9 HYPOTHYROIDISM (ACQUIRED): Chronic | ICD-10-CM

## 2021-08-02 DIAGNOSIS — Z79.899 HIGH RISK MEDICATIONS (NOT ANTICOAGULANTS) LONG-TERM USE: ICD-10-CM

## 2021-08-02 DIAGNOSIS — F41.9 ANXIETY: ICD-10-CM

## 2021-08-02 DIAGNOSIS — F51.01 PRIMARY INSOMNIA: Primary | Chronic | ICD-10-CM

## 2021-08-02 DIAGNOSIS — F51.01 PRIMARY INSOMNIA: Chronic | ICD-10-CM

## 2021-08-02 PROCEDURE — 99214 OFFICE O/P EST MOD 30 MIN: CPT | Performed by: INTERNAL MEDICINE

## 2021-08-02 RX ORDER — CLONAZEPAM 0.5 MG/1
TABLET ORAL
Qty: 90 TABLET | Refills: 0 | Status: SHIPPED | OUTPATIENT
Start: 2021-08-02 | End: 2022-12-29 | Stop reason: SDUPTHER

## 2021-08-02 RX ORDER — TEMAZEPAM 15 MG/1
15 CAPSULE ORAL NIGHTLY PRN
Qty: 30 CAPSULE | Refills: 2 | Status: SHIPPED | OUTPATIENT
Start: 2021-08-02 | End: 2021-12-01 | Stop reason: SDUPTHER

## 2021-08-02 RX ORDER — GABAPENTIN 300 MG/1
CAPSULE ORAL
Qty: 30 CAPSULE | Refills: 5 | Status: SHIPPED | OUTPATIENT
Start: 2021-08-02 | End: 2022-02-07 | Stop reason: SDUPTHER

## 2021-08-02 NOTE — TELEPHONE ENCOUNTER
Noted below. LM 3rd msg for [facility] asking that they send radiologist report via different means than they have been doing as it is NOT attaching to the actual faxes they have sent. The radiologist report is needed for treatment decisions.     Phone [phone] Pt returned call back to office to speak with Kaylyn

## 2021-08-02 NOTE — PROGRESS NOTES
Chief Complaint  Insomnia (follow up), Restless Legs Syndrome, Hypothyroidism, and Med Refill    Subjective          Tyler Doss presents to [facility]  History of Present Illness    Insomnia-he is on temazepam 15 mg .  He filled this 7/27. This has worked as well    Restless leg syndrome -he has been using gabapentin 100 mg 2 at night and this works fairly well though sometimes will still get the restless feeling or cramps in legs, especially when he hs been more active. He does do stretches as well which helps.     Anxiety-uses Klonopin just occasionally.  Last filled in May    Hypothyroid-he is on Synthroid 25.  Last TSH was 3.3 done in 2/21. Energy level is good. Wt stable. BM better    Constipation - better - on linzess and miralax and having a BM daily    Current Outpatient Medications:   •  albuterol sulfate  (90 Base) MCG/ACT inhaler, Inhale 2 puffs Every 4 (Four) Hours As Needed for Wheezing., Disp: 18 g, Rfl: 1  •  azelastine (Astelin) 0.1 % nasal spray, 2 sprays each mostril bid, Disp: 3 each, Rfl: 3  •  clonazePAM (KlonoPIN) 0.5 MG tablet, 1 qd prn anxiety, Disp: 90 tablet, Rfl: 0  •  fluticasone (FLONASE) 50 MCG/ACT nasal spray, 2 sprays into the nostril(s) as directed by provider Daily., Disp: , Rfl:   •  levothyroxine (SYNTHROID, LEVOTHROID) 25 MCG tablet, TAKE ONE TABLET BY MOUTH DAILY, Disp: 90 tablet, Rfl: 0  •  linaclotide (Linzess) 290 MCG capsule capsule, Take 1 capsule by mouth Every Morning Before Breakfast. Wait At least 1 hour  before eating breakfast, Disp: 30 capsule, Rfl: 11  •  methocarbamol (Robaxin) 500 MG tablet, 1 tid prn back spasms, Disp: 45 tablet, Rfl: 3  •  omeprazole (priLOSEC) 20 MG capsule, Take 20 mg by mouth Daily., Disp: , Rfl:   •  temazepam (RESTORIL) 15 MG capsule, Take 1 capsule by mouth At Night As Needed for Sleep., Disp: 30 capsule, Rfl: 2  •  gabapentin (NEURONTIN) 300 MG capsule, 1 qhs, Disp: 30 capsule, Rfl: 5     "  Objective   Vital Signs:   /76 (BP Location: Left arm, Patient Position: Sitting)   Pulse 61   Temp 97.1 °F (36.2 °C) (Infrared)   Ht 189.2 cm (74.5\")   Wt 93.1 kg (205 lb 3.2 oz)   SpO2 99%   BMI 25.99 kg/m²       Physical exam  Constitutional: oriented to person, place, and time.  well-developed and well-nourished. No distress.   HENT:   Head: Normocephalic and atraumatic.   Eyes: Conjunctivae and EOM are normal.   Cardiovascular: Normal rate, regular rhythm and normal heart sounds.  Exam reveals no gallop and no friction rub.    No murmur heard.  Pulmonary/Chest: Effort normal and breath sounds normal. No respiratory distress.   no wheezes.   Neurological:  alert and oriented to person, place, and time.   Skin: Skin is warm and dry. not diaphoretic.   Psychiatric:  normal mood and affect. behavior is normal. Judgment and thought content normal.      Result Review :   The following data was reviewed by: Ya Shafer MD on 08/02/2021:  CMP    CMP 2/5/21   Glucose 83   BUN 12   Creatinine 0.98   eGFR Non African Am 78   Sodium 138   Potassium 4.4   Chloride 100   Calcium 10.2   Albumin 4.60   Total Bilirubin 0.6   Alkaline Phosphatase 52   AST (SGOT) 27   ALT (SGPT) 29           CBC    CBC 2/5/21   WBC 5.94   RBC 5.17   Hemoglobin 15.9   Hematocrit 47.8   MCV 92.5   MCH 30.8   MCHC 33.3   RDW 12.7   Platelets 291           Lipid Panel    Lipid Panel 2/5/21   Total Cholesterol 186   Triglycerides 149   HDL Cholesterol 41   VLDL Cholesterol 27   LDL Cholesterol  118 (A)   LDL/HDL Ratio 2.81   (A) Abnormal value            TSH    TSH 2/5/21   TSH 3.360           Last Urine Toxicity     LAST URINE TOXICITY RESULTS Latest Ref Rng & Units 7/29/2020 6/5/2019    AMPHETAMINES SCREEN, URINE Negative Negative Negative    BARBITURATES SCREEN Negative Negative Negative    BENZODIAZEPINE SCREEN, URINE Negative Positive(A) Positive(A)    BUPRENORPHINEUR Negative Negative Negative    COCAINE SCREEN, URINE Negative " Negative Negative    METHADONE SCREEN, URINE Negative Negative Negative    METHAMPHETAMINEUR Negative Negative Negative                  Assessment and Plan    Diagnoses and all orders for this visit:    1. Primary insomnia (Primary)  Comments:  stable on current regimen. he has signed CSC. kumar appropriate. UDS today  Orders:  -     Urine Drug Screen - Urine, Clean Catch; Future  -     temazepam (RESTORIL) 15 MG capsule; Take 1 capsule by mouth At Night As Needed for Sleep.  Dispense: 30 capsule; Refill: 2    2. RLS (restless legs syndrome)  Comments:  we will raise the gabapentin to 300mg as he is having some breakthrough symptoms. call if any problems  Orders:  -     gabapentin (NEURONTIN) 300 MG capsule; 1 qhs  Dispense: 30 capsule; Refill: 5    3. Hypothyroidism (acquired)  Comments:  pt feels well; will recheck in 2/22 at physical    4. High risk medications (not anticoagulants) long-term use  -     Urine Drug Screen - Urine, Clean Catch; Future    5. Anxiety  Comments:  Uses Klonopin just occasionally.  He would like to go ahead and refill  Orders:  -     clonazePAM (KlonoPIN) 0.5 MG tablet; 1 qd prn anxiety  Dispense: 90 tablet; Refill: 0        Follow Up   Return in about 3 months (around 11/2/2021) for Next scheduled follow up.  Patient was given instructions and counseling regarding his condition or for health maintenance advice. Please see specific information pulled into the AVS if appropriate.

## 2021-08-03 ENCOUNTER — LAB (OUTPATIENT)
Dept: LAB | Facility: HOSPITAL | Age: 61
End: 2021-08-03

## 2021-08-03 DIAGNOSIS — F51.01 PRIMARY INSOMNIA: Chronic | ICD-10-CM

## 2021-08-03 DIAGNOSIS — Z79.899 HIGH RISK MEDICATIONS (NOT ANTICOAGULANTS) LONG-TERM USE: ICD-10-CM

## 2021-08-03 PROCEDURE — 80306 DRUG TEST PRSMV INSTRMNT: CPT | Performed by: INTERNAL MEDICINE

## 2021-09-08 RX ORDER — LEVOTHYROXINE SODIUM 0.03 MG/1
TABLET ORAL
Qty: 90 TABLET | Refills: 1 | Status: SHIPPED | OUTPATIENT
Start: 2021-09-08 | End: 2022-02-23 | Stop reason: SDUPTHER

## 2021-10-08 ENCOUNTER — TELEPHONE (OUTPATIENT)
Dept: INTERNAL MEDICINE | Facility: CLINIC | Age: 61
End: 2021-10-08

## 2021-10-08 NOTE — TELEPHONE ENCOUNTER
PA on linzess was approved by his insurance.  I have faxed over the approval to his insurance.  PN of approval.

## 2021-10-08 NOTE — TELEPHONE ENCOUNTER
Tried to PA his linzess but on cover my meds it won't go through to [facility] I keep getting patient not found with name or date of birth.  I have asked him to call me back to discuss insurance.

## 2021-10-08 NOTE — TELEPHONE ENCOUNTER
Spoke with insurance company and they have the wrong birth date on him.  In their system it is 1960.  I have notified patient of this and he will call and get the birth date fixed on his insurance.  I also have completed the PA on the linzess and waiting on reply from his insurance.

## 2021-10-17 ENCOUNTER — APPOINTMENT (OUTPATIENT)
Dept: MRI IMAGING | Facility: HOSPITAL | Age: 61
End: 2021-10-17

## 2021-10-17 ENCOUNTER — HOSPITAL ENCOUNTER (OUTPATIENT)
Facility: HOSPITAL | Age: 61
Setting detail: OBSERVATION
Discharge: HOME | End: 2021-10-18
Attending: EMERGENCY MEDICINE | Admitting: EMERGENCY MEDICINE

## 2021-10-17 ENCOUNTER — APPOINTMENT (OUTPATIENT)
Dept: CT IMAGING | Facility: HOSPITAL | Age: 61
End: 2021-10-17

## 2021-10-17 ENCOUNTER — APPOINTMENT (OUTPATIENT)
Dept: CARDIOLOGY | Facility: HOSPITAL | Age: 61
End: 2021-10-17

## 2021-10-17 ENCOUNTER — APPOINTMENT (OUTPATIENT)
Dept: GENERAL RADIOLOGY | Facility: HOSPITAL | Age: 61
End: 2021-10-17

## 2021-10-17 DIAGNOSIS — I63.9 ACUTE ISCHEMIC STROKE (HCC): Primary | ICD-10-CM

## 2021-10-17 PROBLEM — R53.1 WEAKNESS: Status: ACTIVE | Noted: 2021-10-17

## 2021-10-17 PROBLEM — R26.89 BALANCE PROBLEM: Status: ACTIVE | Noted: 2021-10-17

## 2021-10-17 PROBLEM — R20.0 NUMBNESS AND TINGLING: Status: ACTIVE | Noted: 2021-10-17

## 2021-10-17 PROBLEM — R20.2 NUMBNESS AND TINGLING: Status: ACTIVE | Noted: 2021-10-17

## 2021-10-17 LAB
ALBUMIN SERPL-MCNC: 4.9 G/DL (ref 3.5–5.2)
ALBUMIN/GLOB SERPL: 2 G/DL
ALP SERPL-CCNC: 58 U/L (ref 39–117)
ALT SERPL W P-5'-P-CCNC: 29 U/L (ref 1–41)
ANION GAP SERPL CALCULATED.3IONS-SCNC: 13 MMOL/L (ref 5–15)
AST SERPL-CCNC: 27 U/L (ref 1–40)
BASE EXCESS BLDA CALC-SCNC: 4 MMOL/L (ref -5–5)
BASOPHILS # BLD AUTO: 0.03 10*3/MM3 (ref 0–0.2)
BASOPHILS NFR BLD AUTO: 0.4 % (ref 0–1.5)
BH CV ECHO MEAS - AO MAX PG (FULL): 1.3 MMHG
BH CV ECHO MEAS - AO MAX PG: 7.1 MMHG
BH CV ECHO MEAS - AO MEAN PG (FULL): 0.71 MMHG
BH CV ECHO MEAS - AO MEAN PG: 3.4 MMHG
BH CV ECHO MEAS - AO ROOT AREA (BSA CORRECTED): 1.8
BH CV ECHO MEAS - AO ROOT AREA: 12.2 CM^2
BH CV ECHO MEAS - AO ROOT DIAM: 3.9 CM
BH CV ECHO MEAS - AO V2 MAX: 133 CM/SEC
BH CV ECHO MEAS - AO V2 MEAN: 83.4 CM/SEC
BH CV ECHO MEAS - AO V2 VTI: 30.8 CM
BH CV ECHO MEAS - ASC AORTA: 3.3 CM
BH CV ECHO MEAS - AVA(I,A): 2.2 CM^2
BH CV ECHO MEAS - AVA(I,D): 2.2 CM^2
BH CV ECHO MEAS - AVA(V,A): 2.6 CM^2
BH CV ECHO MEAS - AVA(V,D): 2.6 CM^2
BH CV ECHO MEAS - BSA(HAYCOCK): 2.1 M^2
BH CV ECHO MEAS - BSA: 2.2 M^2
BH CV ECHO MEAS - BZI_BMI: 24 KILOGRAMS/M^2
BH CV ECHO MEAS - BZI_METRIC_HEIGHT: 190.5 CM
BH CV ECHO MEAS - BZI_METRIC_WEIGHT: 87.1 KG
BH CV ECHO MEAS - EDV(CUBED): 127.3 ML
BH CV ECHO MEAS - EDV(MOD-SP2): 75 ML
BH CV ECHO MEAS - EDV(MOD-SP4): 83 ML
BH CV ECHO MEAS - EDV(TEICH): 119.9 ML
BH CV ECHO MEAS - EF(CUBED): 82.2 %
BH CV ECHO MEAS - EF(MOD-BP): 66 %
BH CV ECHO MEAS - EF(MOD-SP2): 64 %
BH CV ECHO MEAS - EF(MOD-SP4): 66.3 %
BH CV ECHO MEAS - EF(TEICH): 74.7 %
BH CV ECHO MEAS - ESV(CUBED): 22.7 ML
BH CV ECHO MEAS - ESV(MOD-SP2): 27 ML
BH CV ECHO MEAS - ESV(MOD-SP4): 28 ML
BH CV ECHO MEAS - ESV(TEICH): 30.3 ML
BH CV ECHO MEAS - FS: 43.7 %
BH CV ECHO MEAS - IVS/LVPW: 0.77
BH CV ECHO MEAS - IVSD: 0.57 CM
BH CV ECHO MEAS - LA DIMENSION: 3.3 CM
BH CV ECHO MEAS - LA/AO: 0.84
BH CV ECHO MEAS - LAD MAJOR: 4.6 CM
BH CV ECHO MEAS - LAT PEAK E' VEL: 12.3 CM/SEC
BH CV ECHO MEAS - LATERAL E/E' RATIO: 5.4
BH CV ECHO MEAS - LV DIASTOLIC VOL/BSA (35-75): 38.5 ML/M^2
BH CV ECHO MEAS - LV MASS(C)D: 106.6 GRAMS
BH CV ECHO MEAS - LV MASS(C)DI: 49.4 GRAMS/M^2
BH CV ECHO MEAS - LV MAX PG: 5.8 MMHG
BH CV ECHO MEAS - LV MEAN PG: 2.7 MMHG
BH CV ECHO MEAS - LV SYSTOLIC VOL/BSA (12-30): 13 ML/M^2
BH CV ECHO MEAS - LV V1 MAX: 120.4 CM/SEC
BH CV ECHO MEAS - LV V1 MEAN: 75.5 CM/SEC
BH CV ECHO MEAS - LV V1 VTI: 23.7 CM
BH CV ECHO MEAS - LVIDD: 5 CM
BH CV ECHO MEAS - LVIDS: 2.8 CM
BH CV ECHO MEAS - LVLD AP2: 7.5 CM
BH CV ECHO MEAS - LVLD AP4: 8.8 CM
BH CV ECHO MEAS - LVLS AP2: 6 CM
BH CV ECHO MEAS - LVLS AP4: 6.8 CM
BH CV ECHO MEAS - LVOT AREA (M): 2.8 CM^2
BH CV ECHO MEAS - LVOT AREA: 2.9 CM^2
BH CV ECHO MEAS - LVOT DIAM: 1.9 CM
BH CV ECHO MEAS - LVPWD: 0.74 CM
BH CV ECHO MEAS - MED PEAK E' VEL: 8.5 CM/SEC
BH CV ECHO MEAS - MEDIAL E/E' RATIO: 7.8
BH CV ECHO MEAS - MV A MAX VEL: 54.8 CM/SEC
BH CV ECHO MEAS - MV DEC TIME: 0.32 SEC
BH CV ECHO MEAS - MV E MAX VEL: 67.1 CM/SEC
BH CV ECHO MEAS - MV E/A: 1.2
BH CV ECHO MEAS - PA ACC SLOPE: 435.4 CM/SEC^2
BH CV ECHO MEAS - PA ACC TIME: 0.22 SEC
BH CV ECHO MEAS - PA PR(ACCEL): -22 MMHG
BH CV ECHO MEAS - RAP SYSTOLE: 3 MMHG
BH CV ECHO MEAS - RVSP: 23.1 MMHG
BH CV ECHO MEAS - SI(AO): 174.5 ML/M^2
BH CV ECHO MEAS - SI(CUBED): 48.5 ML/M^2
BH CV ECHO MEAS - SI(LVOT): 31.8 ML/M^2
BH CV ECHO MEAS - SI(MOD-SP2): 22.3 ML/M^2
BH CV ECHO MEAS - SI(MOD-SP4): 25.5 ML/M^2
BH CV ECHO MEAS - SI(TEICH): 41.5 ML/M^2
BH CV ECHO MEAS - SV(AO): 376.3 ML
BH CV ECHO MEAS - SV(CUBED): 104.6 ML
BH CV ECHO MEAS - SV(LVOT): 68.6 ML
BH CV ECHO MEAS - SV(MOD-SP2): 48 ML
BH CV ECHO MEAS - SV(MOD-SP4): 55 ML
BH CV ECHO MEAS - SV(TEICH): 89.6 ML
BH CV ECHO MEAS - TR MAX PG: 20.1 MMHG
BH CV ECHO MEAS - TR MAX VEL: 224 CM/SEC
BH CV ECHO MEASUREMENTS AVERAGE E/E' RATIO: 6.45
BILIRUB SERPL-MCNC: 0.3 MG/DL (ref 0–1.2)
BILIRUB UR QL STRIP: NEGATIVE
BUN SERPL-MCNC: 15 MG/DL (ref 8–23)
BUN/CREAT SERPL: 15.8 (ref 7–25)
CA-I BLDA-SCNC: 1.28 MMOL/L (ref 1.2–1.32)
CALCIUM SPEC-SCNC: 9.8 MG/DL (ref 8.6–10.5)
CHLORIDE SERPL-SCNC: 97 MMOL/L (ref 98–107)
CLARITY UR: CLEAR
CO2 BLDA-SCNC: 30 MMOL/L (ref 24–29)
CO2 SERPL-SCNC: 23 MMOL/L (ref 22–29)
COLOR UR: YELLOW
CREAT BLDA-MCNC: 1 MG/DL (ref 0.6–1.3)
CREAT SERPL-MCNC: 0.95 MG/DL (ref 0.76–1.27)
DEPRECATED RDW RBC AUTO: 46.7 FL (ref 37–54)
EOSINOPHIL # BLD AUTO: 0.19 10*3/MM3 (ref 0–0.4)
EOSINOPHIL NFR BLD AUTO: 2.4 % (ref 0.3–6.2)
ERYTHROCYTE [DISTWIDTH] IN BLOOD BY AUTOMATED COUNT: 13.7 % (ref 12.3–15.4)
FLUAV SUBTYP SPEC NAA+PROBE: NOT DETECTED
FLUBV RNA ISLT QL NAA+PROBE: NOT DETECTED
GFR SERPL CREATININE-BSD FRML MDRD: 80 ML/MIN/1.73
GLOBULIN UR ELPH-MCNC: 2.4 GM/DL
GLUCOSE BLDC GLUCOMTR-MCNC: 115 MG/DL (ref 70–130)
GLUCOSE BLDC GLUCOMTR-MCNC: 98 MG/DL (ref 70–130)
GLUCOSE SERPL-MCNC: 111 MG/DL (ref 65–99)
GLUCOSE UR STRIP-MCNC: NEGATIVE MG/DL
HCO3 BLDA-SCNC: 28.7 MMOL/L (ref 22–26)
HCT VFR BLD AUTO: 46 % (ref 37.5–51)
HCT VFR BLDA CALC: 47 % (ref 38–51)
HGB BLD-MCNC: 15.1 G/DL (ref 13–17.7)
HGB BLDA-MCNC: 16 G/DL (ref 12–17)
HGB UR QL STRIP.AUTO: NEGATIVE
IMM GRANULOCYTES # BLD AUTO: 0.03 10*3/MM3 (ref 0–0.05)
IMM GRANULOCYTES NFR BLD AUTO: 0.4 % (ref 0–0.5)
INR PPP: 0.98 (ref 0.85–1.16)
KETONES UR QL STRIP: NEGATIVE
LEFT ATRIUM VOLUME INDEX: 17.6 ML/M^2
LEFT ATRIUM VOLUME: 38 ML
LEUKOCYTE ESTERASE UR QL STRIP.AUTO: NEGATIVE
LV EF 2D ECHO EST: 70 %
LYMPHOCYTES # BLD AUTO: 2.04 10*3/MM3 (ref 0.7–3.1)
LYMPHOCYTES NFR BLD AUTO: 25.3 % (ref 19.6–45.3)
MAGNESIUM SERPL-MCNC: 2 MG/DL (ref 1.6–2.4)
MCH RBC QN AUTO: 30.2 PG (ref 26.6–33)
MCHC RBC AUTO-ENTMCNC: 32.8 G/DL (ref 31.5–35.7)
MCV RBC AUTO: 92 FL (ref 79–97)
MONOCYTES # BLD AUTO: 0.81 10*3/MM3 (ref 0.1–0.9)
MONOCYTES NFR BLD AUTO: 10 % (ref 5–12)
NEUTROPHILS NFR BLD AUTO: 4.96 10*3/MM3 (ref 1.7–7)
NEUTROPHILS NFR BLD AUTO: 61.5 % (ref 42.7–76)
NITRITE UR QL STRIP: NEGATIVE
NRBC BLD AUTO-RTO: 0 /100 WBC (ref 0–0.2)
PCO2 BLDA: 46.1 MM HG (ref 35–45)
PH BLDA: 7.4 PH UNITS (ref 7.35–7.6)
PH UR STRIP.AUTO: 7.5 [PH] (ref 5–8)
PLATELET # BLD AUTO: 277 10*3/MM3 (ref 140–450)
PMV BLD AUTO: 11.5 FL (ref 6–12)
PO2 BLDA: 21 MMHG (ref 80–105)
POTASSIUM BLDA-SCNC: 4 MMOL/L (ref 3.5–4.9)
POTASSIUM SERPL-SCNC: 4.2 MMOL/L (ref 3.5–5.2)
PROT SERPL-MCNC: 7.3 G/DL (ref 6–8.5)
PROT UR QL STRIP: NEGATIVE
PROTHROMBIN TIME: 12.7 SECONDS (ref 11.4–14.4)
RBC # BLD AUTO: 5 10*6/MM3 (ref 4.14–5.8)
SAO2 % BLDA: 34 % (ref 95–98)
SARS-COV-2 RNA PNL SPEC NAA+PROBE: NOT DETECTED
SODIUM BLD-SCNC: 137 MMOL/L (ref 138–146)
SODIUM SERPL-SCNC: 133 MMOL/L (ref 136–145)
SP GR UR STRIP: 1.03 (ref 1–1.03)
T4 FREE SERPL-MCNC: 1.29 NG/DL (ref 0.93–1.7)
TROPONIN T SERPL-MCNC: <0.01 NG/ML (ref 0–0.03)
TSH SERPL DL<=0.05 MIU/L-ACNC: 2.09 UIU/ML (ref 0.27–4.2)
UROBILINOGEN UR QL STRIP: NORMAL
WBC # BLD AUTO: 8.06 10*3/MM3 (ref 3.4–10.8)

## 2021-10-17 PROCEDURE — 82947 ASSAY GLUCOSE BLOOD QUANT: CPT

## 2021-10-17 PROCEDURE — 84295 ASSAY OF SERUM SODIUM: CPT

## 2021-10-17 PROCEDURE — 96360 HYDRATION IV INFUSION INIT: CPT

## 2021-10-17 PROCEDURE — G0378 HOSPITAL OBSERVATION PER HR: HCPCS

## 2021-10-17 PROCEDURE — C9803 HOPD COVID-19 SPEC COLLECT: HCPCS

## 2021-10-17 PROCEDURE — 80053 COMPREHEN METABOLIC PANEL: CPT | Performed by: EMERGENCY MEDICINE

## 2021-10-17 PROCEDURE — 84439 ASSAY OF FREE THYROXINE: CPT | Performed by: EMERGENCY MEDICINE

## 2021-10-17 PROCEDURE — 70551 MRI BRAIN STEM W/O DYE: CPT

## 2021-10-17 PROCEDURE — 82330 ASSAY OF CALCIUM: CPT

## 2021-10-17 PROCEDURE — 82565 ASSAY OF CREATININE: CPT

## 2021-10-17 PROCEDURE — 93306 TTE W/DOPPLER COMPLETE: CPT | Performed by: INTERNAL MEDICINE

## 2021-10-17 PROCEDURE — 70498 CT ANGIOGRAPHY NECK: CPT

## 2021-10-17 PROCEDURE — 85025 COMPLETE CBC W/AUTO DIFF WBC: CPT | Performed by: EMERGENCY MEDICINE

## 2021-10-17 PROCEDURE — 83735 ASSAY OF MAGNESIUM: CPT | Performed by: EMERGENCY MEDICINE

## 2021-10-17 PROCEDURE — 85014 HEMATOCRIT: CPT

## 2021-10-17 PROCEDURE — 71045 X-RAY EXAM CHEST 1 VIEW: CPT

## 2021-10-17 PROCEDURE — 70496 CT ANGIOGRAPHY HEAD: CPT

## 2021-10-17 PROCEDURE — 82803 BLOOD GASES ANY COMBINATION: CPT

## 2021-10-17 PROCEDURE — 0 IOPAMIDOL PER 1 ML: Performed by: EMERGENCY MEDICINE

## 2021-10-17 PROCEDURE — 99220 PR INITIAL OBSERVATION CARE/DAY 70 MINUTES: CPT | Performed by: INTERNAL MEDICINE

## 2021-10-17 PROCEDURE — 93306 TTE W/DOPPLER COMPLETE: CPT

## 2021-10-17 PROCEDURE — 84443 ASSAY THYROID STIM HORMONE: CPT | Performed by: EMERGENCY MEDICINE

## 2021-10-17 PROCEDURE — 87636 SARSCOV2 & INF A&B AMP PRB: CPT | Performed by: INTERNAL MEDICINE

## 2021-10-17 PROCEDURE — 0042T HC CT CEREBRAL PERFUSION W/WO CONTRAST: CPT

## 2021-10-17 PROCEDURE — 85610 PROTHROMBIN TIME: CPT | Performed by: EMERGENCY MEDICINE

## 2021-10-17 PROCEDURE — 84484 ASSAY OF TROPONIN QUANT: CPT | Performed by: EMERGENCY MEDICINE

## 2021-10-17 PROCEDURE — 70450 CT HEAD/BRAIN W/O DYE: CPT

## 2021-10-17 PROCEDURE — 84132 ASSAY OF SERUM POTASSIUM: CPT

## 2021-10-17 PROCEDURE — 99204 OFFICE O/P NEW MOD 45 MIN: CPT

## 2021-10-17 PROCEDURE — 99284 EMERGENCY DEPT VISIT MOD MDM: CPT

## 2021-10-17 PROCEDURE — 81003 URINALYSIS AUTO W/O SCOPE: CPT | Performed by: EMERGENCY MEDICINE

## 2021-10-17 RX ORDER — SODIUM CHLORIDE 0.9 % (FLUSH) 0.9 %
10 SYRINGE (ML) INJECTION AS NEEDED
Status: DISCONTINUED | OUTPATIENT
Start: 2021-10-17 | End: 2021-10-18 | Stop reason: HOSPADM

## 2021-10-17 RX ORDER — CLOPIDOGREL BISULFATE 75 MG/1
75 TABLET ORAL DAILY
Status: DISCONTINUED | OUTPATIENT
Start: 2021-10-18 | End: 2021-10-18 | Stop reason: HOSPADM

## 2021-10-17 RX ORDER — FLUTICASONE PROPIONATE 50 MCG
2 SPRAY, SUSPENSION (ML) NASAL DAILY
Status: DISCONTINUED | OUTPATIENT
Start: 2021-10-17 | End: 2021-10-18 | Stop reason: HOSPADM

## 2021-10-17 RX ORDER — SODIUM CHLORIDE 0.9 % (FLUSH) 0.9 %
10 SYRINGE (ML) INJECTION EVERY 12 HOURS SCHEDULED
Status: DISCONTINUED | OUTPATIENT
Start: 2021-10-17 | End: 2021-10-18 | Stop reason: HOSPADM

## 2021-10-17 RX ORDER — TEMAZEPAM 15 MG/1
15 CAPSULE ORAL NIGHTLY PRN
Status: DISCONTINUED | OUTPATIENT
Start: 2021-10-17 | End: 2021-10-18 | Stop reason: HOSPADM

## 2021-10-17 RX ORDER — CLONAZEPAM 0.5 MG/1
0.5 TABLET ORAL 2 TIMES DAILY PRN
Status: DISCONTINUED | OUTPATIENT
Start: 2021-10-17 | End: 2021-10-18 | Stop reason: HOSPADM

## 2021-10-17 RX ORDER — ASPIRIN 300 MG/1
300 SUPPOSITORY RECTAL DAILY
Status: DISCONTINUED | OUTPATIENT
Start: 2021-10-17 | End: 2021-10-18 | Stop reason: HOSPADM

## 2021-10-17 RX ORDER — ROSUVASTATIN CALCIUM 20 MG/1
20 TABLET, COATED ORAL NIGHTLY
Status: DISCONTINUED | OUTPATIENT
Start: 2021-10-17 | End: 2021-10-18 | Stop reason: HOSPADM

## 2021-10-17 RX ORDER — ACETAMINOPHEN 160 MG/5ML
650 SOLUTION ORAL EVERY 4 HOURS PRN
Status: DISCONTINUED | OUTPATIENT
Start: 2021-10-17 | End: 2021-10-18 | Stop reason: HOSPADM

## 2021-10-17 RX ORDER — ASPIRIN 81 MG/1
81 TABLET, CHEWABLE ORAL DAILY
Status: DISCONTINUED | OUTPATIENT
Start: 2021-10-17 | End: 2021-10-18 | Stop reason: HOSPADM

## 2021-10-17 RX ORDER — LUBIPROSTONE 8 UG/1
8 CAPSULE ORAL
Status: DISCONTINUED | OUTPATIENT
Start: 2021-10-18 | End: 2021-10-18 | Stop reason: HOSPADM

## 2021-10-17 RX ORDER — ACETAMINOPHEN 325 MG/1
650 TABLET ORAL EVERY 4 HOURS PRN
Status: DISCONTINUED | OUTPATIENT
Start: 2021-10-17 | End: 2021-10-18 | Stop reason: HOSPADM

## 2021-10-17 RX ORDER — LEVOTHYROXINE SODIUM 0.03 MG/1
25 TABLET ORAL
Status: DISCONTINUED | OUTPATIENT
Start: 2021-10-18 | End: 2021-10-18 | Stop reason: HOSPADM

## 2021-10-17 RX ORDER — ACETAMINOPHEN 650 MG/1
650 SUPPOSITORY RECTAL EVERY 4 HOURS PRN
Status: DISCONTINUED | OUTPATIENT
Start: 2021-10-17 | End: 2021-10-18 | Stop reason: HOSPADM

## 2021-10-17 RX ORDER — CLOPIDOGREL BISULFATE 75 MG/1
600 TABLET ORAL ONCE
Status: COMPLETED | OUTPATIENT
Start: 2021-10-17 | End: 2021-10-17

## 2021-10-17 RX ORDER — GABAPENTIN 300 MG/1
300 CAPSULE ORAL NIGHTLY PRN
Status: DISCONTINUED | OUTPATIENT
Start: 2021-10-17 | End: 2021-10-18 | Stop reason: HOSPADM

## 2021-10-17 RX ORDER — ALBUTEROL SULFATE 2.5 MG/3ML
2.5 SOLUTION RESPIRATORY (INHALATION) EVERY 4 HOURS PRN
Status: DISCONTINUED | OUTPATIENT
Start: 2021-10-17 | End: 2021-10-18 | Stop reason: HOSPADM

## 2021-10-17 RX ORDER — PANTOPRAZOLE SODIUM 40 MG/1
40 TABLET, DELAYED RELEASE ORAL EVERY MORNING
Status: DISCONTINUED | OUTPATIENT
Start: 2021-10-18 | End: 2021-10-18 | Stop reason: HOSPADM

## 2021-10-17 RX ORDER — METHOCARBAMOL 500 MG/1
500 TABLET, FILM COATED ORAL EVERY 8 HOURS PRN
Status: DISCONTINUED | OUTPATIENT
Start: 2021-10-17 | End: 2021-10-18 | Stop reason: HOSPADM

## 2021-10-17 RX ADMIN — ROSUVASTATIN CALCIUM 20 MG: 20 TABLET, COATED ORAL at 20:59

## 2021-10-17 RX ADMIN — IOPAMIDOL 115 ML: 755 INJECTION, SOLUTION INTRAVENOUS at 13:58

## 2021-10-17 RX ADMIN — SODIUM CHLORIDE, PRESERVATIVE FREE 10 ML: 5 INJECTION INTRAVENOUS at 21:00

## 2021-10-17 RX ADMIN — FLUTICASONE PROPIONATE 2 SPRAY: 50 SPRAY, METERED NASAL at 21:00

## 2021-10-17 RX ADMIN — SODIUM CHLORIDE, PRESERVATIVE FREE 10 ML: 5 INJECTION INTRAVENOUS at 21:01

## 2021-10-17 RX ADMIN — CLONAZEPAM 0.5 MG: 0.5 TABLET ORAL at 23:37

## 2021-10-17 RX ADMIN — CLOPIDOGREL BISULFATE 600 MG: 75 TABLET ORAL at 15:15

## 2021-10-17 RX ADMIN — ASPIRIN 81 MG CHEWABLE TABLET 81 MG: 81 TABLET CHEWABLE at 15:15

## 2021-10-17 NOTE — H&P
[facility] Medicine Services  HISTORY AND PHYSICAL    Patient Name: Tyler Doss  : 1960  MRN: [id number]  Primary Care Physician: Ya Shafer MD  Date of admission: 10/17/2021      Subjective   Subjective     Chief Complaint:  Numbness tingling, balance problems    HPI:  Tyler Doss is a 60 y.o. male never smoker with HLD, chronic constipation, insomnia who presents to the hospital for evaluation of paresthesias and balance difficulties.  Approximately 1 week he noticed while walking his balance was off and that he felt like his legs were not doing what he wanted them to do, he describes it as a feeling of walking on a sponge.  Initially he attributed this to recently moving and carrying a lot of heavy objects. This has persisted throughout the week, today in the middle of the day he developed a numbness/tingling feeling on his left forearm and left thigh which raised concern in combination with the balance issues prompting evaluation in the ED.  The paresthesias are improving at this time.    His grandfather had a stroke in the 7th decade of life, both of his older siblings have atrial fibrillation.      COVID Details:    Symptoms:    [x] NONE [] Fever []  Cough [] Shortness of breath [] Change in taste/smell      The patient has a COVID HM Topic on their chart, and they are fully vaccinated.      Review of Systems   Gen- No fevers, chills  CV- No chest pain, palpitations  Resp- No cough, dyspnea  GI- No N/V/D, abd pain  All other systems reviewed and are negative.     Personal History     Past Medical History:   Diagnosis Date   • Anxiety    • Chronic constipation    • Colon polyp    • Constipation    • Depression    • Diverticulosis     by colon   • Eye exam, routine    • GERD (gastroesophageal reflux disease)    • H/O cardiovascular stress test 10/2015    neg   • H/O colonoscopy 2015     dilated and slightly tortuos colon   • H/O  echocardiogram 10/2015    neg   • H/O x-ray of lumbar spine 02/2015    hypertrophic facet changes l3-s1, significant    • History of dental examination 06/2012   • Prostatitis    • Retinal tear of left eye 06/2018   • Screening PSA (prostate specific antigen) 11/2015    0.3   • Seasonal allergies        Past Surgical History:   Procedure Laterality Date   • CATARACT EXTRACTION Right 11/29/2016   • COLONOSCOPY  09/20/2020    Dr. Ravi   • ENDOSCOPY  09/2014    neg   • EYE SURGERY Left 06/2018    retinal tear       Family History:  family history includes Atrial fibrillation in his brother and sister; Cancer in his sister; Colon polyps in his brother; Crohn's disease in his father; Depression in an other family member; Diabetes in his sister; Heart attack in his paternal uncle; Heart attack (age of onset: 66) in his father; Heart disease in an other family member; Hyperlipidemia in his brother and sister; Hypothyroidism in his sister; Immunodeficiency in his son; Ovarian cancer in an other family member; Ovarian cancer (age of onset: 59) in his mother; Prostate cancer in an other family member; Stroke in his paternal grandfather; Stroke (age of onset: 51) in his sister; Thyroid disease in his brother, mother, and paternal grandmother; Uterine cancer (age of onset: 40) in his paternal grandmother. Otherwise pertinent FHx was reviewed and unremarkable.     Social History:  reports that he has never smoked. He has never used smokeless tobacco. He reports current alcohol use of about 3.0 standard drinks of alcohol per week. He reports that he does not use drugs.  Social History     Social History Narrative   • Not on file       Medications:  Available home medication information reviewed.  Medications Prior to Admission   Medication Sig Dispense Refill Last Dose   • albuterol sulfate  (90 Base) MCG/ACT inhaler Inhale 2 puffs Every 4 (Four) Hours As Needed for Wheezing. 18 g 1    • azelastine (Astelin) 0.1 % nasal  spray 2 sprays each mostril bid 3 each 3    • clonazePAM (KlonoPIN) 0.5 MG tablet 1 qd prn anxiety 90 tablet 0    • fluticasone (FLONASE) 50 MCG/ACT nasal spray 2 sprays into the nostril(s) as directed by provider Daily.      • gabapentin (NEURONTIN) 300 MG capsule 1 qhs 30 capsule 5    • levothyroxine (SYNTHROID, LEVOTHROID) 25 MCG tablet TAKE ONE TABLET BY MOUTH DAILY 90 tablet 1    • linaclotide (Linzess) 290 MCG capsule capsule TAKE ONE CAPSULE BY MOUTH EVERY MORNING BEFORE BREAKFAST. AT LEAST 30 MINUTES BEFORE BREAKFAST 30 capsule 11    • methocarbamol (Robaxin) 500 MG tablet 1 tid prn back spasms 45 tablet 3    • omeprazole (priLOSEC) 20 MG capsule Take 20 mg by mouth Daily.      • temazepam (RESTORIL) 15 MG capsule Take 1 capsule by mouth At Night As Needed for Sleep. 30 capsule 2        Allergies   Allergen Reactions   • Atorvastatin Myalgia   • Penicillins Rash   • Pravastatin Myalgia       Objective   Objective     Vital Signs:   Temp:  [98 °F (36.7 °C)] 98 °F (36.7 °C)  Heart Rate:  [81-85] 85  Resp:  [16] 16  BP: (125-144)/(83-88) 125/83  Total (NIH Stroke Scale): 1    Physical Exam   Constitutional: Awake, alert, no acute distress, sitting up in chair in the ED  Eyes: PERRL, sclerae anicteric, no conjunctival injection  HENT: NCAT, mucous membranes moist  Neck: Supple, trachea midline  Respiratory: Clear to auscultation bilaterally, nonlabored respirations   Cardiovascular: RRR, no murmurs, rubs, or gallops, palpable radial pulses bilaterally  Gastrointestinal: Positive bowel sounds, soft, nontender, nondistended  Musculoskeletal: No bilateral ankle edema, no clubbing or cyanosis to extremities  Psychiatric: Appropriate affect, cooperative  Neurologic: Oriented x 3, strength symmetric in all extremities, Cranial Nerves grossly intact to confrontation, speech clear    Result Review:  I have personally reviewed the results from the time of this admission to 10/17/2021 16:02 EDT and agree with these  findings:  []  Laboratory  []  Microbiology  [x]  Radiology  []  EKG/Telemetry   []  Cardiology/Vascular   []  Pathology  []  Old records  []  Other:  Most notable findings include: CT brain unremarkable      LAB RESULTS:      Lab 10/17/21  1415 10/17/21  1349   WBC 8.06  --    HEMOGLOBIN 15.1  --    HEMOGLOBIN, POC  --  16.0   HEMATOCRIT 46.0  --    HEMATOCRIT POC  --  47   PLATELETS 277  --    NEUTROS ABS 4.96  --    IMMATURE GRANS (ABS) 0.03  --    LYMPHS ABS 2.04  --    MONOS ABS 0.81  --    EOS ABS 0.19  --    MCV 92.0  --    PROTIME 12.7  --    INR 0.98  --          Lab 10/17/21  1415 10/17/21  1349   SODIUM 133*  --    POTASSIUM 4.2  --    CHLORIDE 97*  --    CO2 23.0  --    ANION GAP 13.0  --    BUN 15  --    CREATININE 0.95 1.00   GLUCOSE 111*  --    CALCIUM 9.8  --    MAGNESIUM 2.0  --    TSH 2.090  --          Lab 10/17/21  1415   TOTAL PROTEIN 7.3   ALBUMIN 4.90   GLOBULIN 2.4   ALT (SGPT) 29   AST (SGOT) 27   BILIRUBIN 0.3   ALK PHOS 58         Lab 10/17/21  1415   TROPONIN T <0.010                 Lab 10/17/21  1349   PH, ARTERIAL 7.40     UA    Urinalysis 10/17/21   Specific Gravity, UA 1.026   Ketones, UA Negative   Blood, UA Negative   Leukocytes, UA Negative   Nitrite, UA Negative             Microbiology Results (last 10 days)     ** No results found for the last 240 hours. **          CT Angiogram Neck    Result Date: 10/17/2021  EXAMINATION: CT ANGIOGRAM NECK-  INDICATION: Stroke, follow up  TECHNIQUE: Pre and postcontrast 3 mm and 0.75 mm axial images through the neck with sagittal and coronal 2-D reconstructions and 3-D VRT and MIP angiographic reconstructions.  The radiation dose reduction device was turned on for each scan per the ALARA (As Low as Reasonably Achievable) protocol.  COMPARISON: NONE  FINDINGS: Reconstruction images show prominent ICA infundibulum bilaterally, and tortuosity but no evidence of significant carotid stenosis the neck.  Axial thin section images show limited  visualization of the proximal subclavian arteries but no obvious stenosis.  On the right, no evidence of hemodynamically significant common, internal, or external carotid artery stenosis is seen to the level of skull base. The ectatic right ICA infundibulum measures up to 12 mm in diameter. No significant thrombus is appreciated.  On the left, no significant common carotid artery stenosis is seen. There is again a large ICA infundibulum as on the contralateral side, and a left this measures up to 12 mm in diameter. There is no evidence of underlying thrombus or calcification. No evidence of significant ICA or ECA stenosis is identified to the level of skull base.  Vertebral arteries are moderate in size, difficult to evaluate well proximally due to streak artifact, but grossly normal in appearance elsewhere.  Included lung apices and superior segment appear grossly normal. No evidence of significant incidental soft tissue neck pathology is appreciated.          Impression: 1. Ectatic right and left ICA infundibulum, up to 12 mm bilaterally. No evidence of atherosclerosis/thrombus. 2. No evidence of hemodynamically significant carotid or vertebral artery stenosis in the neck.       XR Chest 1 View    Result Date: 10/17/2021  EXAMINATION: XR CHEST 1 VW-  INDICATION: stroke alert  COMPARISON: NONE  FINDINGS: The heart mediastinum and pulmonary vasculature appear within normal limits. Lungs are well-expanded and show some generalized coarsening of the pulmonary interstitial markings, but no second focal lung disease is seen. No effusion or pneumothorax is seen.       Impression: Mild chronic appearing interstitial lung changes. No acute chest disease is identified.        CT Head Without Contrast Stroke Protocol    Result Date: 10/17/2021  EXAMINATION: CT HEAD WO CONTRAST STROKE PROTOCOL-  INDICATION: stroke  TECHNIQUE: 5 mm unenhanced images through the brain  The radiation dose reduction device was turned on for each  scan per the ALARA (As Low as Reasonably Achievable) protocol.  COMPARISON: NONE  FINDINGS: The calvarium appears intact. Included paranasal sinuses and mastoids appear clear. Soft tissue window images show no evidence of hemorrhage contusion or edema, no evidence of mass or mass effect, acute infarction, or hydrocephalus. At the skull vertex on the left, there there is focal prominence of the CSF over a roughly 3 x 1 similar area, whether a small developmental irregularity of the cortex, small arachnoid cyst, or area of evidence of inflammation from previous infarct, but the adjacent parenchyma appears grossly normal. No similar findings are seen elsewhere.        Impression: 1. No evidence of acute infarction or other acute intrapelvic disease. 2.. Incidentally noted mild focal prominence of the left frontoparietal CSF space, whether from prior insult, developmental variant, or whether representing a small arachnoid cyst.       CT Angiogram Head w AI Analysis of LVO    Result Date: 10/17/2021  EXAMINATION: CT ANGIOGRAM HEAD W AI ANALYSIS OF LVO-  INDICATION: Stroke, follow up  TECHNIQUE: Pre and postcontrast 0.75 mm and 3 mm axial images through the brain with sagittal and coronal 2-D reconstructions and 3-D VRT and MIP angiographic reconstructions.  The radiation dose reduction device was turned on for each scan per the ALARA (As Low as Reasonably Achievable) protocol.  AI analysis of LVO was utilized.  COMPARISON: There is no evidence of hemodynamically significant stenosis of the inferior portions of the internal carotid arteries. Anterior and middle cerebral arteries and proximal branches appear normal in caliber.  The vertebral arteries and basilar artery and posterior cerebral arteries are relatively small in caliber, apparently due to the presence fairly large bilateral posterior connecting arteries. No significant focal stenosis is seen.  FINDINGS:        Impression: 1. No evidence of hemodynamically  significant intracranial vascular stenosis. 2. Small basilar artery and vertebral arteries, apparently due to the presence of bilateral posterior communicating arteries supplying most of the flow to the posterior cerebral arteries.        CT CEREBRAL PERFUSION WITH & WITHOUT CONTRAST    Result Date: 10/17/2021  EXAMINATION: CT CEREBRAL PERFUSION W WO CONTRAST-  INDICATION: Neuro deficit, acute, stroke suspected  TECHNIQUE: Cerebral perfusion analysis was performed using computed tomography with IV contrast initiation including postprocessing of parametric maps with determination of cerebral blood flow, cerebral blood volume in transit time and time to drain and T-Max  The radiation dose reduction device was turned on for each scan per the ALARA (As Low as Reasonably Achievable) protocol.  COMPARISON: Unenhanced CT scan of same date  FINDINGS: Rapid analysis images show no areas of the brain with cerebral blood flow less than 30%. Focal area shown as potential ischemia, with T-Max through the six seconds is noted in the medial right parieto-occipital region, with no corresponding CT scan abnormality. Individual perfusion maps show no consistent focal abnormality of blood flow to suggest significant ischemia or infarct.        Impression: No evidence of large vessel occlusion or ischemia. Rapid analysis images with questionable small focus of ischemia in the right temporal proximal region, potentially artifactual.             Assessment/Plan   Assessment & Plan     Active Hospital Problems    Diagnosis  POA   • **Balance problem [R26.89]  Yes   • Numbness and tingling [R20.0, R20.2]  Yes   • Chronic constipation [K59.09]  Yes   • Primary insomnia [F51.01]  Yes   • Anxiety [F41.9]  Yes     Summary: This is a 60-year-old non-smoker with HLD, chronic constipation, insomnia who presented to hospital for evaluation of 1 week balance difficulty with subsequent development of left arm and leg  paresthesias    Assessment/plan    Balance difficulty with unilateral paresthesia  -CT brain without acute process  -CTA head/neck with small basilar and vertebral arteries; balance difficulties could be c/w posterior circulation issue  -MRI ordered and pending  -Echo ordered and pending  -Seen by stroke navigator, ischemic stroke order set ordered by them  -PT/OT  -Received aspirin and Plavix load in the ED  -Prior intolerance to pravastatin and atorvastatin, cramping; Crestor ordered here  -Patient notes that both of his siblings have atrial fibrillation    Chronic constipation  -Takes Linzess at home -> [employer] substitute for formulary    GERD  -Pantoprazole    Hypothyroidism  -Levothyroxine    Insomnia  -Restoril at bedtime as needed for sleep    DVT prophylaxis: Mechanical      CODE STATUS: Per patient and wife at bedside he is full code  Code Status and Medical Interventions:   Ordered at: 10/17/21 1602     Level Of Support Discussed With:    Patient     Code Status:    CPR     Medical Interventions (Level of Support Prior to Arrest):    Full       Admission Status:  I believe this patient meets OBSERVATION status, however if further evaluation or treatment plans warrant, status may change.  Based upon current information, I predict patient's care encounter to be less than or equal to 2 midnights.      David Parker, DO  10/17/21

## 2021-10-17 NOTE — ED PROVIDER NOTES
"Subjective   60-year-old male presents for evaluation of left-sided numbness.  He states that his gait has felt \"a bit off\" for the past week or so.  Today, he was at [employer] with his wife at approximately noon when he was sitting on the pew with his arm around her and began experiencing numbness and tingling in his left arm.  This was shortly thereafter followed by numbness and tingling to his left leg.  The numbness in his left arm is improved spontaneously but is still mildly present.  The numbness in his left lower extremity has not gone away.  He denies any accompanying speech changes.  He denies any focal weakness.  No similar episodes before in the past.  He is unsure as to what may have triggered his symptoms.  He was concerned about a potential stroke and subsequently came to the ED to be evaluated today.          Review of Systems   Neurological: Positive for numbness.   All other systems reviewed and are negative.      Past Medical History:   Diagnosis Date   • Anxiety    • Chronic constipation    • Colon polyp 2020   • Constipation    • Depression    • Diverticulosis 2020    by colon   • Eye exam, routine 2011   • GERD (gastroesophageal reflux disease)    • H/O cardiovascular stress test 10/2015    neg   • H/O colonoscopy 08/2015     dilated and slightly tortuos colon   • H/O echocardiogram 10/2015    neg   • H/O x-ray of lumbar spine 02/2015    hypertrophic facet changes l3-s1, significant    • History of dental examination 06/2012   • Prostatitis    • Retinal tear of left eye 06/2018   • Screening PSA (prostate specific antigen) 11/2015    0.3   • Seasonal allergies        Allergies   Allergen Reactions   • Atorvastatin Myalgia   • Penicillins Rash   • Pravastatin Myalgia       Past Surgical History:   Procedure Laterality Date   • CATARACT EXTRACTION Right 11/29/2016   • COLONOSCOPY  09/20/2020    Dr. Ravi   • ENDOSCOPY  09/2014    neg   • EYE SURGERY Left 06/2018    retinal tear       Family History "   Problem Relation Age of Onset   • Ovarian cancer Mother 59        thyroid/goiter   • Thyroid disease Mother         goiter   • Heart attack Father 66        crohn's disease   • Crohn's disease Father    • Diabetes Sister         CVA, hyperlipidemia   • Hypothyroidism Sister    • Stroke Sister 51   • Hyperlipidemia Sister    • Cancer Sister         intestinal; carcinoid   • Atrial fibrillation Sister    • Thyroid disease Brother         partial thyroidectomy benign   • Hyperlipidemia Brother    • Atrial fibrillation Brother    • Colon polyps Brother    • Uterine cancer Paternal Grandmother 40   • Thyroid disease Paternal Grandmother         Mass   • Stroke Paternal Grandfather         before 60   • Heart attack Paternal Uncle         in 70's   • Heart disease Other    • Depression Other    • Prostate cancer Other    • Ovarian cancer Other    • Immunodeficiency Son         IgA deficiency       Social History     Socioeconomic History   • Marital status:    Tobacco Use   • Smoking status: Never Smoker   • Smokeless tobacco: Never Used   Substance and Sexual Activity   • Alcohol use: Yes     Alcohol/week: 3.0 standard drinks     Types: 3 Standard drinks or equivalent per week     Comment: weekly   • Drug use: No           Objective   Physical Exam  Vitals and nursing note reviewed.   Constitutional:       General: He is not in acute distress.     Appearance: He is well-developed. He is not diaphoretic.      Comments: Nontoxic-appearing male   HENT:      Head: Normocephalic and atraumatic.   Neck:      Vascular: No JVD.   Cardiovascular:      Rate and Rhythm: Normal rate and regular rhythm.      Heart sounds: Normal heart sounds. No murmur heard.  No friction rub. No gallop.    Pulmonary:      Effort: Pulmonary effort is normal. No respiratory distress.      Breath sounds: Normal breath sounds. No wheezing or rales.   Abdominal:      General: Bowel sounds are normal. There is no distension.      Palpations:  "Abdomen is soft. There is no mass.      Tenderness: There is no abdominal tenderness. There is no guarding.   Musculoskeletal:         General: Normal range of motion.      Cervical back: Normal range of motion.   Skin:     General: Skin is warm and dry.      Coloration: Skin is not pale.      Findings: No erythema or rash.   Neurological:      General: No focal deficit present.      Mental Status: He is alert and oriented to person, place, and time.      Comments: Awake, alert, and oriented x3, clear and fluent speech, no ataxia or dysmetria, normal gait, decreased sensation noted to left lower extremity when compared to the right; otherwise neurovascularly intact distally in other 3 extremities, 5 out of 5 strength in all fours, no cranial nerve deficits noted with cranial nerves II through XII grossly intact   Psychiatric:         Thought Content: Thought content normal.         Judgment: Judgment normal.         Critical Care  Performed by: Barber Nation MD  Authorized by: Barber Nation MD     Critical care provider statement:     Critical care time (minutes):  35    Critical care was necessary to treat or prevent imminent or life-threatening deterioration of the following conditions:  CNS failure or compromise    Critical care was time spent personally by me on the following activities:  Discussions with consultants, development of treatment plan with patient or surrogate, evaluation of patient's response to treatment, examination of patient, obtaining history from patient or surrogate, ordering and performing treatments and interventions, ordering and review of laboratory studies, ordering and review of radiographic studies, pulse oximetry, re-evaluation of patient's condition and review of old charts               ED Course  ED Course as of 10/17/21 1626   Sun Oct 17, 2021   1350 60-year-old male presents for evaluation of left-sided numbness.  He states that his gait has felt a bit \"off\" for " the past week.  Today, at noon he was sitting in [place] with his wife when he began experiencing numbness in his left upper extremity followed by numbness in his left lower extremity.  No accompanying weakness.  No speech difficulty.  He became concerned and subsequently came to the ED to be evaluated.  On arrival, a stroke alert was called from the lobby.  Our stroke navigator, Evy, met me in the CT scanner.  On exam, the patient's only neurological deficit is decreased sensation to his left lower extremity when compared to the right.  NIH stroke scale of 1.  CT head negative for bleed.  No indication for TPA based on the patient's minor deficits, especially since the numbness in his left upper extremity has improved spontaneously. [DD]   1418 After reviewing the patient's advanced imaging studies, the patient's case was discussed with Dr. Ramirez of neurology.  He agreed with refraining from giving the patient TPA as he has technically been having symptoms for a week and his sensory symptoms seem to be improving spontaneously. [DD]   1418 He recommended loading the patient with Plavix and admitting to the hospitalist for an MRI and further stroke work-up.  I am in agreement with his expert assessment.  Awaiting callback from the hospitalist at this time. [DD]   1450 Discussed the patient's case with Dr. Parker, and the patient will be admitted under his care for further evaluation and treatment. The patient is aware/agreeable with the plan at this time. [DD]   1450 I personally viewed the patient's x-ray images myself, and I am in agreement with the radiologist's reading for final interpretation.     [DD]      ED Course User Index  [DD] Barber Nation MD                                   Recent Results (from the past 24 hour(s))   POC Surgery Labs    Collection Time: 10/17/21  1:49 PM    Specimen: Blood   Result Value Ref Range    Ionized Calcium 1.28 1.20 - 1.32 mmol/L    POC Potassium 4.0 3.5 - 4.9  mmol/L    Sodium 137 (L) 138 - 146 mmol/L    Total CO2 30 (H) 24 - 29 mmol/L    Hemoglobin 16.0 12.0 - 17.0 g/dL    Hematocrit 47 38 - 51 %    pCO2, Arterial 46.1 (H) 35 - 45 mm Hg    pO2, Arterial 21 (L) 80 - 105 mmHg    Base Excess 4.0000 -5 - 5 mmol/L    O2 Saturation, Arterial 34 (L) 95 - 98 %    pH, Arterial 7.40 7.35 - 7.6 pH units    HCO3, Arterial 28.7 (H) 22 - 26 mmol/L    Glucose 115 70 - 130 mg/dL   POC Creatinine    Collection Time: 10/17/21  1:49 PM    Specimen: Blood   Result Value Ref Range    Creatinine 1.00 0.60 - 1.30 mg/dL   Comprehensive Metabolic Panel    Collection Time: 10/17/21  2:15 PM    Specimen: Blood   Result Value Ref Range    Glucose 111 (H) 65 - 99 mg/dL    BUN 15 8 - 23 mg/dL    Creatinine 0.95 0.76 - 1.27 mg/dL    Sodium 133 (L) 136 - 145 mmol/L    Potassium 4.2 3.5 - 5.2 mmol/L    Chloride 97 (L) 98 - 107 mmol/L    CO2 23.0 22.0 - 29.0 mmol/L    Calcium 9.8 8.6 - 10.5 mg/dL    Total Protein 7.3 6.0 - 8.5 g/dL    Albumin 4.90 3.50 - 5.20 g/dL    ALT (SGPT) 29 1 - 41 U/L    AST (SGOT) 27 1 - 40 U/L    Alkaline Phosphatase 58 39 - 117 U/L    Total Bilirubin 0.3 0.0 - 1.2 mg/dL    eGFR Non African Amer 80 >60 mL/min/1.73    Globulin 2.4 gm/dL    A/G Ratio 2.0 g/dL    BUN/Creatinine Ratio 15.8 7.0 - 25.0    Anion Gap 13.0 5.0 - 15.0 mmol/L   Protime-INR    Collection Time: 10/17/21  2:15 PM    Specimen: Blood   Result Value Ref Range    Protime 12.7 11.4 - 14.4 Seconds    INR 0.98 0.85 - 1.16   Troponin    Collection Time: 10/17/21  2:15 PM    Specimen: Blood   Result Value Ref Range    Troponin T <0.010 0.000 - 0.030 ng/mL   Magnesium    Collection Time: 10/17/21  2:15 PM    Specimen: Blood   Result Value Ref Range    Magnesium 2.0 1.6 - 2.4 mg/dL   TSH    Collection Time: 10/17/21  2:15 PM    Specimen: Blood   Result Value Ref Range    TSH 2.090 0.270 - 4.200 uIU/mL   T4, Free    Collection Time: 10/17/21  2:15 PM    Specimen: Blood   Result Value Ref Range    Free T4 1.29 0.93 - 1.70  ng/dL   CBC Auto Differential    Collection Time: 10/17/21  2:15 PM    Specimen: Blood   Result Value Ref Range    WBC 8.06 3.40 - 10.80 10*3/mm3    RBC 5.00 4.14 - 5.80 10*6/mm3    Hemoglobin 15.1 13.0 - 17.7 g/dL    Hematocrit 46.0 37.5 - 51.0 %    MCV 92.0 79.0 - 97.0 fL    MCH 30.2 26.6 - 33.0 pg    MCHC 32.8 31.5 - 35.7 g/dL    RDW 13.7 12.3 - 15.4 %    RDW-SD 46.7 37.0 - 54.0 fl    MPV 11.5 6.0 - 12.0 fL    Platelets 277 140 - 450 10*3/mm3    Neutrophil % 61.5 42.7 - 76.0 %    Lymphocyte % 25.3 19.6 - 45.3 %    Monocyte % 10.0 5.0 - 12.0 %    Eosinophil % 2.4 0.3 - 6.2 %    Basophil % 0.4 0.0 - 1.5 %    Immature Grans % 0.4 0.0 - 0.5 %    Neutrophils, Absolute 4.96 1.70 - 7.00 10*3/mm3    Lymphocytes, Absolute 2.04 0.70 - 3.10 10*3/mm3    Monocytes, Absolute 0.81 0.10 - 0.90 10*3/mm3    Eosinophils, Absolute 0.19 0.00 - 0.40 10*3/mm3    Basophils, Absolute 0.03 0.00 - 0.20 10*3/mm3    Immature Grans, Absolute 0.03 0.00 - 0.05 10*3/mm3    nRBC 0.0 0.0 - 0.2 /100 WBC   Urinalysis With Microscopic If Indicated (No Culture) - Urine, Clean Catch    Collection Time: 10/17/21  3:02 PM    Specimen: Urine, Clean Catch   Result Value Ref Range    Color, UA Yellow Yellow, Straw    Appearance, UA Clear Clear    pH, UA 7.5 5.0 - 8.0    Specific Gravity, UA 1.026 1.001 - 1.030    Glucose, UA Negative Negative    Ketones, UA Negative Negative    Bilirubin, UA Negative Negative    Blood, UA Negative Negative    Protein, UA Negative Negative    Leuk Esterase, UA Negative Negative    Nitrite, UA Negative Negative    Urobilinogen, UA 0.2 E.U./dL 0.2 - 1.0 E.U./dL     Note: In addition to lab results from this visit, the labs listed above may include labs taken at another facility or during a different encounter within the last 24 hours. Please correlate lab times with ED admission and discharge times for further clarification of the services performed during this visit.    XR Chest 1 View   Preliminary Result   Mild chronic  "appearing interstitial lung changes. No acute   chest disease is identified.               CT Angiogram Head w AI Analysis of LVO   Preliminary Result   1. No evidence of hemodynamically significant intracranial vascular   stenosis.   2. Small basilar artery and vertebral arteries, apparently due to the   presence of bilateral posterior communicating arteries supplying most of   the flow to the posterior cerebral arteries.                  CT CEREBRAL PERFUSION WITH & WITHOUT CONTRAST   Preliminary Result   No evidence of large vessel occlusion or ischemia. Rapid   analysis images with questionable small focus of ischemia in the right   temporal proximal region, potentially artifactual.              CT Angiogram Neck   Preliminary Result   1. Ectatic right and left ICA infundibulum, up to 12 mm bilaterally. No   evidence of atherosclerosis/thrombus.   2. No evidence of hemodynamically significant carotid or vertebral   artery stenosis in the neck.              CT Head Without Contrast Stroke Protocol   Preliminary Result   1. No evidence of acute infarction or other acute intrapelvic disease.   2.. Incidentally noted mild focal prominence of the left frontoparietal   CSF space, whether from prior insult, developmental variant, or whether   representing a small arachnoid cyst.              MRI Brain Without Contrast    (Results Pending)     Vitals:    10/17/21 1336 10/17/21 1424 10/17/21 1605   BP: 144/88 125/83 (!) 142/106   BP Location: Left arm  Left arm   Patient Position: Sitting  Lying   Pulse: 81 85 71   Resp: 16  18   Temp: 98 °F (36.7 °C)  98 °F (36.7 °C)   TempSrc: Tympanic  Oral   SpO2: 98% 96% 97%   Weight: 87.5 kg (193 lb)  92.2 kg (203 lb 3.2 oz)   Height: 190.5 cm (75\")       Medications   sodium chloride 0.9 % flush 10 mL (has no administration in time range)   sodium chloride 0.9 % flush 10 mL (has no administration in time range)   aspirin chewable tablet 81 mg (81 mg Oral Given 10/17/21 1515)     " Or   aspirin suppository 300 mg ( Rectal Not Given:  See Alt 10/17/21 1515)   clopidogrel (PLAVIX) tablet 600 mg (600 mg Oral Given 10/17/21 1515)     And   clopidogrel (PLAVIX) tablet 75 mg (has no administration in time range)   sodium chloride 0.9 % bolus 1,000 mL (has no administration in time range)   rosuvastatin (CRESTOR) tablet 20 mg (has no administration in time range)   albuterol (PROVENTIL) nebulizer solution 0.083% 2.5 mg/3mL (has no administration in time range)   clonazePAM (KlonoPIN) tablet 0.5 mg (has no administration in time range)   fluticasone (FLONASE) 50 MCG/ACT nasal spray 2 spray (has no administration in time range)   gabapentin (NEURONTIN) capsule 300 mg (has no administration in time range)   levothyroxine (SYNTHROID, LEVOTHROID) tablet 25 mcg (has no administration in time range)   lubiprostone (AMITIZA) capsule 8 mcg (has no administration in time range)   methocarbamol (ROBAXIN) tablet 500 mg (has no administration in time range)   pantoprazole (PROTONIX) EC tablet 40 mg (has no administration in time range)   temazepam (RESTORIL) capsule 15 mg (has no administration in time range)   sodium chloride 0.9 % flush 10 mL (has no administration in time range)   sodium chloride 0.9 % flush 10 mL (has no administration in time range)   acetaminophen (TYLENOL) tablet 650 mg (has no administration in time range)     Or   acetaminophen (TYLENOL) 160 MG/5ML solution 650 mg (has no administration in time range)     Or   acetaminophen (TYLENOL) suppository 650 mg (has no administration in time range)   influenza vac split quad (FLUZONE,FLUARIX,AFLURIA,FLULAVAL) injection 0.5 mL (has no administration in time range)   iopamidol (ISOVUE-370) 76 % injection 115 mL (115 mL Intravenous Given 10/17/21 1358)     ECG/EMG Results (last 24 hours)     ** No results found for the last 24 hours. **        No orders to display               MDM    Final diagnoses:   Acute ischemic stroke (HCC)       ED  Disposition  ED Disposition     ED Disposition Condition Comment    Decision to Admit  Level of Care: Telemetry [5]   Diagnosis: Weakness [060218]            No follow-up provider specified.       Medication List      No changes were made to your prescriptions during this visit.          Barber Nation MD  10/17/21 4254

## 2021-10-17 NOTE — CONSULTS
"Stroke Consult Note    Patient Name: Tyler Doss   MRN: [id number]  Age: 60 y.o.  Sex: male  : 1960    Primary Care Physician: Ya Shafer MD  Referring Physician:  Dr. Hemal Nation    TIME STROKE TEAM CALLED: 1337 EST     TIME PATIENT SEEN: 1339 EST    Handedness: Right  Race:     Chief Complaint/Reason for Consultation: Left-sided paresthesias    Subjective .  HPI: Mr. Doss is a 60-year-old male with known medical diagnosis of hyperlipidemia, GERD, hypothyroidism, and RLS who presents to the emergency department for further evaluation of left-sided paresthesias which she noticed this morning while sitting in [employer] service.  He states that at approximately 1200 he noticed that he had tingling in his left thigh and left forearm.  He denies any unilateral weakness, visual disturbances, difficulty speaking, facial drooping, or headache.  He does report that he feels that he has been \"off balance\" for the past week and did sustain a fall yesterday, without injury to his head or loss of consciousness.  Since arriving in the emergency department he states that the paresthesias in his leg are improving however still mildly present in his left forearm.    He did states that he does not take any antithrombotic or anticoagulation medications.  He has a family history of stroke in his paternal grandfather.    Last Known Normal Date/Time: 1 week ago     Review of Systems   Constitutional: Negative for activity change, chills, fatigue and fever.   HENT: Positive for congestion and rhinorrhea. Negative for sore throat and trouble swallowing.    Eyes: Negative for photophobia and visual disturbance.   Respiratory: Positive for shortness of breath. Negative for cough and chest tightness.    Cardiovascular: Positive for leg swelling. Negative for chest pain and palpitations.   Gastrointestinal: Positive for constipation and diarrhea. Negative for blood in stool, nausea and vomiting.   Endocrine: " Negative.    Genitourinary: Positive for frequency. Negative for difficulty urinating, dysuria and hematuria.   Musculoskeletal: Positive for gait problem.   Skin: Negative.    Neurological: Positive for numbness. Negative for dizziness, seizures, facial asymmetry, speech difficulty, weakness and headaches.   Hematological: Negative.    Psychiatric/Behavioral: Negative.       Past Medical History:   Diagnosis Date   • Anxiety    • Chronic constipation    • Colon polyp 2020   • Constipation    • Depression    • Diverticulosis 2020    by colon   • Eye exam, routine 2011   • GERD (gastroesophageal reflux disease)    • H/O cardiovascular stress test 10/2015    neg   • H/O colonoscopy 08/2015     dilated and slightly tortuos colon   • H/O echocardiogram 10/2015    neg   • H/O x-ray of lumbar spine 02/2015    hypertrophic facet changes l3-s1, significant    • History of dental examination 06/2012   • Prostatitis    • Retinal tear of left eye 06/2018   • Screening PSA (prostate specific antigen) 11/2015    0.3   • Seasonal allergies      Past Surgical History:   Procedure Laterality Date   • CATARACT EXTRACTION Right 11/29/2016   • COLONOSCOPY  09/20/2020    Dr. Ravi   • ENDOSCOPY  09/2014    neg   • EYE SURGERY Left 06/2018    retinal tear     Family History   Problem Relation Age of Onset   • Ovarian cancer Mother 59        thyroid/goiter   • Thyroid disease Mother         goiter   • Heart attack Father 66        crohn's disease   • Crohn's disease Father    • Diabetes Sister         CVA, hyperlipidemia   • Hypothyroidism Sister    • Stroke Sister 51   • Hyperlipidemia Sister    • Cancer Sister         intestinal; carcinoid   • Atrial fibrillation Sister    • Thyroid disease Brother         partial thyroidectomy benign   • Hyperlipidemia Brother    • Atrial fibrillation Brother    • Colon polyps Brother    • Uterine cancer Paternal Grandmother 40   • Thyroid disease Paternal Grandmother         Mass   • Stroke Paternal  Grandfather         before 60   • Heart attack Paternal Uncle         in 70's   • Heart disease Other    • Depression Other    • Prostate cancer Other    • Ovarian cancer Other    • Immunodeficiency Son         IgA deficiency     Social History     Socioeconomic History   • Marital status:    Tobacco Use   • Smoking status: Never Smoker   • Smokeless tobacco: Never Used   Substance and Sexual Activity   • Alcohol use: Yes     Alcohol/week: 3.0 standard drinks     Types: 3 Standard drinks or equivalent per week     Comment: weekly   • Drug use: No     Allergies   Allergen Reactions   • Atorvastatin Myalgia   • Penicillins Rash   • Pravastatin Myalgia     Prior to Admission medications    Medication Sig Start Date End Date Taking? Authorizing Provider   albuterol sulfate  (90 Base) MCG/ACT inhaler Inhale 2 puffs Every 4 (Four) Hours As Needed for Wheezing. 2/4/21   Ya Shafer MD   azelastine (Astelin) 0.1 % nasal spray 2 sprays each mostril bid 4/7/21   Ya Shafer MD   clonazePAM (KlonoPIN) 0.5 MG tablet 1 qd prn anxiety 8/2/21   Ya Shafer MD   fluticasone (FLONASE) 50 MCG/ACT nasal spray 2 sprays into the nostril(s) as directed by provider Daily.    Provider, MD Ynes   gabapentin (NEURONTIN) 300 MG capsule 1 qhs 8/2/21   Ya Shafer MD   levothyroxine (SYNTHROID, LEVOTHROID) 25 MCG tablet TAKE ONE TABLET BY MOUTH DAILY 9/8/21   Ya Shafer MD   linaclotide (Linzess) 290 MCG capsule capsule TAKE ONE CAPSULE BY MOUTH EVERY MORNING BEFORE BREAKFAST. AT LEAST 30 MINUTES BEFORE BREAKFAST 9/8/21   Ya Shafer MD   methocarbamol (Robaxin) 500 MG tablet 1 tid prn back spasms 11/2/20   Ya Shafer MD   omeprazole (priLOSEC) 20 MG capsule Take 20 mg by mouth Daily.    Provider, MD Ynes   temazepam (RESTORIL) 15 MG capsule Take 1 capsule by mouth At Night As Needed for Sleep. 8/2/21   Ya Shafer MD           Objective     Temp:  [98 °F (36.7 °C)] 98 °F  (36.7 °C)  Heart Rate:  [81] 81  Resp:  [16] 16  BP: (144)/(88) 144/88  Neurological Exam  Mental Status  Awake, alert and oriented to person, place and time.Alert. Recent and remote memory are intact. Speech is normal. Language is fluent with no aphasia. Attention and concentration are normal.    Cranial Nerves  CN II: Visual fields full to confrontation.  CN III, IV, VI: Extraocular movements intact bilaterally. Pupils equal round and reactive to light bilaterally.  CN V: Facial sensation is normal.  CN VII: Full and symmetric facial movement.  CN VIII: Hearing intact bilaterally.  CN IX, X: Palate elevates symmetrically  CN XI: Shoulder shrug strength is normal.  CN XII: Tongue midline without atrophy or fasciculations.    Motor  Normal muscle bulk throughout. No fasciculations present. Normal muscle tone. Strength is 5/5 throughout all four extremities.    Sensory  Light touch abnormality: Left forearm.     Coordination  Finger-to-nose, rapid alternating movements and heel-to-shin normal bilaterally without dysmetria.    Gait  Unsteady gait.      Physical Exam  Vitals and nursing note reviewed.   Constitutional:       General: He is not in acute distress.     Appearance: Normal appearance. He is not ill-appearing.   HENT:      Head: Normocephalic and atraumatic.      Mouth/Throat:      Mouth: Mucous membranes are dry.      Pharynx: Oropharynx is clear.   Eyes:      Extraocular Movements: Extraocular movements intact.      Pupils: Pupils are equal, round, and reactive to light.   Cardiovascular:      Rate and Rhythm: Normal rate and regular rhythm.   Pulmonary:      Effort: Pulmonary effort is normal. No respiratory distress.      Comments: On room air  Skin:     General: Skin is warm and dry.   Neurological:      Mental Status: He is alert and oriented to person, place, and time.      Cranial Nerves: No cranial nerve deficit.      Sensory: Sensory deficit present.      Motor: No weakness.      Coordination:  Coordination is intact. Coordination normal.      Gait: Gait abnormal.      Deep Tendon Reflexes: Strength normal.   Psychiatric:         Mood and Affect: Mood normal.         Speech: Speech normal.         Behavior: Behavior normal.       Acute Stroke Data    Alteplase (tPA) Inclusion / Exclusion Criteria    Time: 13:56 EDT  Person Administering Scale: GIOVANNA Mccormack    Inclusion Criteria  [x]   18 years of age or greater   []   Onset of symptoms < 4.5 hours before beginning treatment (stroke onset = time patient was last seen well or without symptoms).   [x]   Diagnosis of acute ischemic stroke causing measurable disabling deficit (Complete Hemianopia, Any Aphasia, Visual or Sensory Extinction, Any weakness limiting sustained effort against gravity)   []   Any remaining deficit considered potentially disabling in view of patient and practitioner   Exclusion criteria (Do not proceed with Alteplase if any are checked under exclusion criteria)  [x]   Onset unknown or GREATER than 4.5 hours   []   ICH on CT/MRI   []   CT demonstrates hypodensity representing acute or subacute infarct   []   Significant head trauma or prior stroke in the previous 3 months   []   Symptoms suggestive of subarachnoid hemorrhage   []   History of un-ruptured intracranial aneurysm GREATER than 10 mm   []   Recent intracranial or intraspinal surgery within the last 3 months   []   Arterial puncture at a non-compressible site in the previous 7 days   []   Active internal bleeding   []   Acute bleeding tendency   []   Platelet count LESS than 100,000 for known hematological diseases such as leukemia, thrombocytopenia or chronic cirrhosis   []   Current use of anticoagulant with INR GREATER than 1.7 or PT GREATER than 15 seconds, aPTT GREATER than 40 seconds   []   Heparin received within 48 hours, resulting in abnormally elevated aPTT GREATER than upper limit of normal   []   Current use of direct thrombin inhibitors or direct  factor Xa inhibitors in the past 48 hours   []   Elevated blood pressure refractory to treatment (systolic GREATER than 185 mm/Hg or diastolic  GREATER than 110 mm/Hg   []   Suspected infective endocarditis and aortic arch dissection   []   Current use of therapeutic treatment dose of low-molecular-weight heparin (LMWH) within the previous 24 hours   []   Structural GI malignancy or bleed   Relative exclusion for all patients  [x]   Only minor non-disabling symptoms   []   Pregnancy   []   Seizure at onset with postictal residual neurological impairments   []   Major surgery or previous trauma within past 14 days   []   History of previous spontaneous ICH, intracranial neoplasm, or AV malformation   []   Postpartum (within previous 14 days)   []   Recent GI or urinary tract hemorrhage (within previous 21 days)   []   Recent acute MI (within previous 3 months)   []   History of un-ruptured intracranial aneurysm LESS than 10 mm   []   History of ruptured intracranial aneurysm   []   Blood glucose LESS than 50 mg/dL (2.7 mmol/L)   []   Dural puncture within the last 7 days   []   Known GREATER than 10 cerebral microbleeds   Additional exclusions for patients with symptoms onset between 3 and 4.5 hours.  []   Age > 80.   []   On any anticoagulants regardless of INR  >>> Warfarin (Coumadin), Heparin, Enoxaparin (Lovenox), fondaparinux (Arixtra), bivalirudin (Angiomax), Argatroban, dabigatran (Pradaxa), rivaroxaban (Xarelto), or apixaban (Eliquis)   []   Severe stroke (NIHSS > 25).   []   History of BOTH diabetes and previous ischemic stroke.   []   The risks and benefits have been discussed with the patient or family related to the administration of IV Alteplase for stroke symptoms.   []   I have discussed and reviewed the patient's case and imaging with the attending prior to IV Alteplase.   N/A Time Alteplase administered       Hospital Meds:  Scheduled- iopamidol, 150 mL, Intravenous, Once in imaging      Infusions-      PRNs-     Functional Status Prior to Current Stroke/[place] Score: 0    NIH Stroke Scale  Time: 13:56 EDT  Person Administering Scale: GIOVANNA Mccormack    Interval: baseline  1a. Level of Consciousness: 0-->Alert, keenly responsive  1b. LOC Questions: 0-->Answers both questions correctly  1c. LOC Commands: 0-->Performs both tasks correctly  2. Best Gaze: 0-->Normal  3. Visual: 0-->No visual loss  4. Facial Palsy: 0-->Normal symmetrical movements  5a. Motor Arm, Left: 0-->No drift, limb holds 90 (or 45) degrees for full 10 secs  5b. Motor Arm, Right: 0-->No drift, limb holds 90 (or 45) degrees for full 10 secs  6a. Motor Leg, Left: 0-->No drift, leg holds 30 degree position for full 5 secs  6b. Motor Leg, Right: 0-->No drift, leg holds 30 degree position for full 5 secs  7. Limb Ataxia: 0-->Absent  8. Sensory: 1-->Mild-to-moderate sensory loss, patient feels pinprick is less sharp or is dull on the affected side, or there is a loss of superficial pain with pinprick, but patient is aware of being touched (left forearm)  9. Best Language: 0-->No aphasia, normal  10. Dysarthria: 0-->Normal  11. Extinction and Inattention (formerly Neglect): 0-->No abnormality    Total (NIH Stroke Scale): 1    Results Reviewed:  I have personally reviewed current lab, radiology, and data and agree with results.    CT of the head without contrast is negative for hemorrhage and/or acute process.     CTA head/neck with small bilateral vertebral arteries and small basilar artery but no evidence of flow-limiting stenosis or large vessel occlusive stroke.    CTP with perfusion deficit within the right PCA territory (may be artifact) however after reviewing the scans with Dr. Ryan there is no target vessel for neuro intervention.        Assessment/Plan:    This is a 60-year-old right-handed  male with stroke risk factors of hyperlipidemia who presents to the emergency department after experiencing sudden onset of  left-sided paresthesias at approximately 1200 today.  On arrival to the emergency department he states that his symptoms have somewhat improved.  He does report that he has experienced difficulty with his balance over the past week and therefore is not a candidate for IV alteplase therapy.  CT perfusion scan reveals a potiental area of restricted diffusion within the right PCA territory however upon reviewing the CTA head/neck there is no target vessel for endovascular therapy.       1. Suspected acute right PCA ischemic stroke  -TIA/CVA order set without thrombolytic therapy has been initiated  -MRI of the brain without contrast  -N.p.o. until nursing dysphagia screen completed, then patient can be started on a cardiac healthy diet  -Plavix 600 mg load now followed by 75 mg daily  -ASA 81 mg now and daily  -1 L normal saline bolus  -TTE  -A1c and lipid panel in a.m.  -Bedrest until MRI of the brain is complete  -Allow permissive hypertension overnight for adequate cerebral perfusion    2. Hyperlipidemia  -Patient states he has a history of hyperlipidemia however he has intolerance to multiple statins and experiences severe myalgias.  -Discussed the importance of statin medications in reducing future stroke risks and he is willing to consider taking a low dose statin to see if he is able to tolerate.  Has not tried rosuvastatin in the past.  -Rosuvastatin 20mg nightly  -Lipid panel in a.m.    Discussed the patient with Dr. Ramirez.  Plan of care was discussed with the patient, his wife (over telephone), and Dr. Nation who will admit the patient to the hospital service for further stroke work-up.  Stroke neurology will continue to follow.    Evy Garcia, GIOVANNA  October 17, 2021  13:56 EDT

## 2021-10-18 ENCOUNTER — READMISSION MANAGEMENT (OUTPATIENT)
Dept: CALL CENTER | Facility: HOSPITAL | Age: 61
End: 2021-10-18

## 2021-10-18 ENCOUNTER — APPOINTMENT (OUTPATIENT)
Dept: NEUROLOGY | Facility: HOSPITAL | Age: 61
End: 2021-10-18

## 2021-10-18 ENCOUNTER — TELEPHONE (OUTPATIENT)
Dept: NEUROLOGY | Facility: CLINIC | Age: 61
End: 2021-10-18

## 2021-10-18 ENCOUNTER — TELEPHONE (OUTPATIENT)
Dept: INTERNAL MEDICINE | Facility: CLINIC | Age: 61
End: 2021-10-18

## 2021-10-18 VITALS
HEIGHT: 75 IN | WEIGHT: 203.2 LBS | SYSTOLIC BLOOD PRESSURE: 120 MMHG | DIASTOLIC BLOOD PRESSURE: 83 MMHG | OXYGEN SATURATION: 97 % | TEMPERATURE: 98.1 F | HEART RATE: 78 BPM | RESPIRATION RATE: 18 BRPM | BODY MASS INDEX: 25.27 KG/M2

## 2021-10-18 PROBLEM — R20.0 NUMBNESS AND TINGLING: Status: RESOLVED | Noted: 2021-10-17 | Resolved: 2021-10-18

## 2021-10-18 PROBLEM — R20.2 NUMBNESS AND TINGLING: Status: RESOLVED | Noted: 2021-10-17 | Resolved: 2021-10-18

## 2021-10-18 PROBLEM — R26.89 BALANCE PROBLEM: Status: RESOLVED | Noted: 2021-10-17 | Resolved: 2021-10-18

## 2021-10-18 PROBLEM — G93.0 ARACHNOID CYST: Status: ACTIVE | Noted: 2021-10-18

## 2021-10-18 LAB
CHOLEST SERPL-MCNC: 173 MG/DL (ref 0–200)
GLUCOSE BLDC GLUCOMTR-MCNC: 112 MG/DL (ref 70–130)
HBA1C MFR BLD: 5.3 % (ref 4.8–5.6)
HDLC SERPL-MCNC: 41 MG/DL (ref 40–60)
LDLC SERPL CALC-MCNC: 113 MG/DL (ref 0–100)
LDLC/HDLC SERPL: 2.71 {RATIO}
TRIGL SERPL-MCNC: 105 MG/DL (ref 0–150)
VLDLC SERPL-MCNC: 19 MG/DL (ref 5–40)

## 2021-10-18 PROCEDURE — 92523 SPEECH SOUND LANG COMPREHEN: CPT

## 2021-10-18 PROCEDURE — G0378 HOSPITAL OBSERVATION PER HR: HCPCS

## 2021-10-18 PROCEDURE — 83036 HEMOGLOBIN GLYCOSYLATED A1C: CPT

## 2021-10-18 PROCEDURE — 80061 LIPID PANEL: CPT

## 2021-10-18 PROCEDURE — 99213 OFFICE O/P EST LOW 20 MIN: CPT | Performed by: PSYCHIATRY & NEUROLOGY

## 2021-10-18 PROCEDURE — 99217 PR OBSERVATION CARE DISCHARGE MANAGEMENT: CPT | Performed by: HOSPITALIST

## 2021-10-18 PROCEDURE — 97530 THERAPEUTIC ACTIVITIES: CPT

## 2021-10-18 PROCEDURE — 95816 EEG AWAKE AND DROWSY: CPT

## 2021-10-18 PROCEDURE — 82962 GLUCOSE BLOOD TEST: CPT

## 2021-10-18 PROCEDURE — 97165 OT EVAL LOW COMPLEX 30 MIN: CPT

## 2021-10-18 RX ORDER — ROSUVASTATIN CALCIUM 5 MG/1
5 TABLET, COATED ORAL DAILY
Qty: 30 TABLET | Refills: 5 | Status: SHIPPED | OUTPATIENT
Start: 2021-10-18 | End: 2021-10-29 | Stop reason: SDUPTHER

## 2021-10-18 RX ORDER — CLOPIDOGREL BISULFATE 75 MG/1
75 TABLET ORAL DAILY
Qty: 30 TABLET | Refills: 0 | Status: SHIPPED | OUTPATIENT
Start: 2021-10-19 | End: 2021-10-18

## 2021-10-18 RX ORDER — ASPIRIN 81 MG/1
81 TABLET, CHEWABLE ORAL DAILY
Qty: 30 TABLET | Refills: 0 | Status: SHIPPED | OUTPATIENT
Start: 2021-10-19

## 2021-10-18 RX ORDER — CLOPIDOGREL BISULFATE 75 MG/1
75 TABLET ORAL DAILY
Qty: 30 TABLET | Refills: 0 | Status: SHIPPED | OUTPATIENT
Start: 2021-10-19 | End: 2021-11-18

## 2021-10-18 RX ORDER — ROSUVASTATIN CALCIUM 20 MG/1
20 TABLET, COATED ORAL NIGHTLY
Qty: 30 TABLET | Refills: 0 | Status: SHIPPED | OUTPATIENT
Start: 2021-10-18 | End: 2021-10-18 | Stop reason: SDUPTHER

## 2021-10-18 RX ORDER — ROSUVASTATIN CALCIUM 20 MG/1
20 TABLET, COATED ORAL NIGHTLY
Qty: 30 TABLET | Refills: 0 | Status: SHIPPED | OUTPATIENT
Start: 2021-10-18 | End: 2021-10-18

## 2021-10-18 RX ADMIN — SODIUM CHLORIDE, PRESERVATIVE FREE 10 ML: 5 INJECTION INTRAVENOUS at 08:37

## 2021-10-18 RX ADMIN — TEMAZEPAM 15 MG: 15 CAPSULE ORAL at 00:36

## 2021-10-18 RX ADMIN — CLOPIDOGREL BISULFATE 75 MG: 75 TABLET ORAL at 08:36

## 2021-10-18 RX ADMIN — FLUTICASONE PROPIONATE 2 SPRAY: 50 SPRAY, METERED NASAL at 08:36

## 2021-10-18 RX ADMIN — PANTOPRAZOLE SODIUM 40 MG: 40 TABLET, DELAYED RELEASE ORAL at 06:09

## 2021-10-18 RX ADMIN — LUBIPROSTONE 8 MCG: 8 CAPSULE, GELATIN COATED ORAL at 08:34

## 2021-10-18 RX ADMIN — LEVOTHYROXINE SODIUM 25 MCG: 25 TABLET ORAL at 06:09

## 2021-10-18 RX ADMIN — ASPIRIN 81 MG CHEWABLE TABLET 81 MG: 81 TABLET CHEWABLE at 08:36

## 2021-10-18 NOTE — THERAPY DISCHARGE NOTE
Acute Care - Speech Language Pathology Initial Evaluation/Discharge  [facility]   Cognitive-Communication Evaluation       Patient Name: Tyler Doss  : 1960  MRN: [id number]  Today's Date: 10/18/2021               Admit Date: 10/17/2021     Visit Dx:    ICD-10-CM ICD-9-CM   1. Acute ischemic stroke (HCC)  I63.9 434.91     Patient Active Problem List   Diagnosis   • Pure hypercholesterolemia   • Primary insomnia   • Anxiety   • Esophageal reflux   • Depressive disorder   • Attention deficit hyperactivity disorder   • Allergic rhinitis   • Dysfunction of eustachian tube   • Obstructive sleep apnea, adult   • Overweight   • Leg cramps   • Hypothyroidism (acquired)   • Chronic constipation   • RLS (restless legs syndrome)   • Numbness and tingling   • Balance problem     Past Medical History:   Diagnosis Date   • Anxiety    • Chronic constipation    • Colon polyp    • Constipation    • Depression    • Diverticulosis 2020    by colon   • Eye exam, routine    • GERD (gastroesophageal reflux disease)    • H/O cardiovascular stress test 10/2015    neg   • H/O colonoscopy 2015     dilated and slightly tortuos colon   • H/O echocardiogram 10/2015    neg   • H/O x-ray of lumbar spine 2015    hypertrophic facet changes l3-s1, significant    • History of dental examination 2012   • Hyperlipidemia    • Prostatitis    • Retinal tear of left eye 2018   • Screening PSA (prostate specific antigen) 2015    0.3   • Seasonal allergies      Past Surgical History:   Procedure Laterality Date   • CATARACT EXTRACTION Right 2016   • COLONOSCOPY  2020    Dr. Ravi   • ENDOSCOPY  2014    neg   • EYE SURGERY Left 2018    retinal tear       SLP Recommendation and Plan  SLP Diagnosis: No deficits identified with speech, language or cognition at this time (10/18/21 1000)     SLC Criteria for Skilled Therapy Interventions Met: no problems identified which require skilled intervention,  baseline status (10/18/21 1000)  Anticipated Discharge Disposition (SLP): home (10/18/21 1000)                    Reason for Discharge: all goals and outcomes met, no further needs identified (10/18/21 1000)         SLP EVALUATION (last 72 hours)     SLP SLC Evaluation     Row Name 10/18/21 1000                   Communication Assessment/Intervention    Document Type evaluation  -        Subjective Information no complaints  -        Patient Observations alert; cooperative; agree to therapy  -        Patient/Family/Caregiver Comments/Observations spouse present  -        Care Plan Review evaluation/treatment results reviewed; care plan/treatment goals reviewed; risks/benefits reviewed; current/potential barriers reviewed; patient/other agree to care plan  -        Care Plan Review, Other Participant(s) spouse  -        Patient Effort excellent  -        Symptoms Noted During/After Treatment none  -                  General Information    Patient Profile Reviewed yes  -        Pertinent History Of Current Problem Patient admitted on stroke pathway with L paresthesias. SLC-E completed per stroke protocol. NIH 1, CT: no acute findings.  -        Precautions/Limitations, Vision WFL; for purposes of eval  -        Precautions/Limitations, Hearing WFL; for purposes of eval  -        Prior Level of Function-Communication WFL  -        Plans/Goals Discussed with patient; spouse/S.O.; agreed upon  -        Barriers to Rehab none identified  -        Patient's Goals for Discharge return to all previous roles/activities  -                  Pain    Additional Documentation Pain Scale: FACES Pre/Post-Treatment (Group)  -                  Pain Scale: FACES Pre/Post-Treatment    Pain: FACES Scale, Pretreatment 0-->no hurt  -                  Comprehension Assessment/Intervention    Comprehension Assessment/Intervention Auditory Comprehension; Reading Comprehension  -                  Auditory  Comprehension Assessment/Intervention    Auditory Comprehension (Communication) WNL  -                  Reading Comprehension Assessment/Intervention    Reading Comprehension (Communication) WNL  -        Functional Reading Tasks WNL  -                  Expression Assessment/Intervention    Expression Assessment/Intervention verbal expression; graphic expression  -CH                  Verbal Expression Assessment/Intervention    Verbal Expression WNL  -        Automatic Speech (Communication) response to greeting; WNL  -        Repetition sentences; WNL  -        Phrase Completion unpredictable; WNL  -        Responsive Naming complex; WNL  -        Confrontational Naming low frequency; WNL  -        Spontaneous/Functional Words complex; WNL  -        Sentence Formulation complex; WNL  -        Conversational Discourse/Fluency WN  -                  Graphic Expression Assessment/Intervention    Graphic Expression WNL  -        Biographical Information name; address; WNL  -                  Oral Motor Structure and Function    Oral Motor Structure and Function WNL  -                  Motor Speech Assessment/Intervention    Motor Speech Function WNL  -                  Cursory Voice Assessment/Intervention    Quality and Resonance (Voice) WNL  -                  Cognitive Assessment Intervention- SLP    Cognitive Function (Cognition) WNL  -        Orientation Status (Cognition) awareness of basic personal information; person; place; time; situation; WNL  -        Memory (Cognitive) simple; functional; immediate; short-term; long-term; delayed; WNL  -        Attention (Cognitive) selective; sustained; alternating; divided; attention to detail; WNL  -        Thought Organization (Cognitive) concrete divergent; abstract divergent; concrete convergent; abstract convergent; verbal sequencing; WNL  -        Reasoning (Cognitive) simple; deductive; mental flexibility; WNL  -         Problem Solving (Cognitive) simple; divergent; temporal; WNL  -        Functional Math (Cognitive) simple; word problems; WNL  -        Executive Function (Cognition) WNL  -        Pragmatics (Communication) WNL  -                  SLP Clinical Impressions    SLP Diagnosis No deficits identified with speech, language or cognition at this time  -        SLC Criteria for Skilled Therapy Interventions Met no problems identified which require skilled intervention; baseline status  -        Functional Impact no impact on function  -                  Recommendations    Therapy Frequency (SLP SLC) evaluation only  -        Anticipated Discharge Disposition (SLP) home  -                  SLP Discharge Summary    Discharge Destination unknown  -        Discharge Diagnostic Statement No deficits identified with speech, language or cognition at this time  -        Progress Toward Achieving Short/long Term Goals discharge on same date as initial evaluation  -        Reason for Discharge all goals and outcomes met, no further needs identified  -              User Key  (r) = Recorded By, (t) = Taken By, (c) = Cosigned By    Initials Name Effective Dates     Ekta Garner, MS CCC-SLP 06/16/21 -                Section K & L                   EDUCATION  The patient has been educated in the following areas:   Cognitive Impairment Communication Impairment.                  Time Calculation:    Time Calculation- SLP     Row Name 10/18/21 1045             Time Calculation- SLP    SLP Start Time 1000  -CH      SLP Received On 10/18/21  -              Untimed Charges    SLP Eval/Re-eval  ST Eval Speech and Production w/ Language - [id number]  -      [id number] Eval Speech and Production w/ Language Minutes 45  -CH              Total Minutes    Untimed Charges Total Minutes 45  -CH       Total Minutes 45  -CH            User Key  (r) = Recorded By, (t) = Taken By, (c) = Cosigned By    Initials Name Provider  Type    CH Ekta Garner MS CCC-SLP Speech and Language Pathologist                Therapy Charges for Today     Code Description Service Date Service Provider Modifiers Qty    [id number] HC ST EVAL SPEECH AND PROD W LANG  3 10/18/2021 Ekta Garner MS CCC-SLP GN 1                   SLP Discharge Summary  Anticipated Discharge Disposition (SLP): home  Reason for Discharge: all goals and outcomes met, no further needs identified  Progress Toward Achieving Short/long Term Goals: discharge on same date as initial evaluation  Discharge Destination: unknown    MS LACHELLE Cooper  10/18/2021     Patient was not wearing a face mask and did not exhibit coughing during this therapy encounter.  Procedure performed was aerosolizing, involved close contact (within 6 feet for at least 15 minutes or longer), and did not involve contact with infectious secretions or specimens.  Therapist used appropriate personal protective equipment including gloves, standard procedure mask and eye protection.  Appropriate PPE was worn during the entire therapy session.  Hand hygiene was completed before and after therapy session.

## 2021-10-18 NOTE — PLAN OF CARE
Goal Outcome Evaluation:           Progress: no change   Pt to go to [facility] valve clinic tomorrow at 14;15 for holter monitor

## 2021-10-18 NOTE — PLAN OF CARE
Problem: Adult Inpatient Plan of Care  Goal: Plan of Care Review  Recent Flowsheet Documentation  Taken 10/18/2021 5565 by Sakshi Taylor OT  Progress: improving  Plan of Care Reviewed With:   patient   spouse  Outcome Summary: OT IE completed post chart review. Pt A & O x 4 and reported no pain throughout. Pt initially presented with L sided paresthesia however resolved prior to OT eval and grossly pt demonstrated I in gross ADLs and related t/f and mobility with no AD used throughout. Pt demonstrated good safety awareness and optimal hand position throughout and presented with good coordination, strength, ROM and sensation at BUEs, no concerns noted or verbalized with good symmetry at L and R UEs. OT educated pt and spouse on good safety in regard to transition home as pt resumes tasks and seeking timely care if s/s arise at home with good understanding and competency noted.  Pt does not require further skilled IPOT POC and recommend home with A at d/c.

## 2021-10-18 NOTE — PROGRESS NOTES
Neurology Note    Patient:  Tyler Doss    YOB: 1960    REFERRING PHYSICIAN:  Dr. Wagner    CHIEF COMPLAINT:    Left sided numbness    HISTORY OF PRESENT ILLNESS:   The patient is back to baseline, denies new concerns.    Past Medical History:  Past Medical History:   Diagnosis Date   • Anxiety    • Chronic constipation    • Colon polyp 2020   • Constipation    • Depression    • Diverticulosis 2020    by colon   • Eye exam, routine 2011   • GERD (gastroesophageal reflux disease)    • H/O cardiovascular stress test 10/2015    neg   • H/O colonoscopy 08/2015     dilated and slightly tortuos colon   • H/O echocardiogram 10/2015    neg   • H/O x-ray of lumbar spine 02/2015    hypertrophic facet changes l3-s1, significant    • History of dental examination 06/2012   • Hyperlipidemia    • Prostatitis    • Retinal tear of left eye 06/2018   • Screening PSA (prostate specific antigen) 11/2015    0.3   • Seasonal allergies        Past Surgical History:  Past Surgical History:   Procedure Laterality Date   • CATARACT EXTRACTION Right 11/29/2016   • COLONOSCOPY  09/20/2020    Dr. Ravi   • ENDOSCOPY  09/2014    neg   • EYE SURGERY Left 06/2018    retinal tear       Social History:   Social History     Socioeconomic History   • Marital status:    Tobacco Use   • Smoking status: Never Smoker   • Smokeless tobacco: Never Used   Substance and Sexual Activity   • Alcohol use: Yes     Alcohol/week: 3.0 standard drinks     Types: 3 Standard drinks or equivalent per week     Comment: weekly   • Drug use: No        Family History:   Family History   Problem Relation Age of Onset   • Ovarian cancer Mother 59        thyroid/goiter   • Thyroid disease Mother         goiter   • Heart attack Father 66        crohn's disease   • Crohn's disease Father    • Diabetes Sister         CVA, hyperlipidemia   • Hypothyroidism Sister    • Stroke Sister 51   • Hyperlipidemia Sister    • Cancer Sister          intestinal; carcinoid   • Atrial fibrillation Sister    • Thyroid disease Brother         partial thyroidectomy benign   • Hyperlipidemia Brother    • Atrial fibrillation Brother    • Colon polyps Brother    • Uterine cancer Paternal Grandmother 40   • Thyroid disease Paternal Grandmother         Mass   • Stroke Paternal Grandfather         before 60   • Heart attack Paternal Uncle         in 70's   • Heart disease Other    • Depression Other    • Prostate cancer Other    • Ovarian cancer Other    • Immunodeficiency Son         IgA deficiency       Medications Prior to Admission:    Prior to Admission medications    Medication Sig Start Date End Date Taking? Authorizing Provider   albuterol sulfate  (90 Base) MCG/ACT inhaler Inhale 2 puffs Every 4 (Four) Hours As Needed for Wheezing. 2/4/21   Ya Shafer MD   azelastine (Astelin) 0.1 % nasal spray 2 sprays each mostril bid 4/7/21   Ya Shafer MD   clonazePAM (KlonoPIN) 0.5 MG tablet 1 qd prn anxiety 8/2/21   Ya Shafer MD   fluticasone (FLONASE) 50 MCG/ACT nasal spray 2 sprays into the nostril(s) as directed by provider Daily.    Provider, MD Ynes   gabapentin (NEURONTIN) 300 MG capsule 1 qhs 8/2/21   Ya Shafer MD   levothyroxine (SYNTHROID, LEVOTHROID) 25 MCG tablet TAKE ONE TABLET BY MOUTH DAILY 9/8/21   Ya Shafer MD   linaclotide (Linzess) 290 MCG capsule capsule TAKE ONE CAPSULE BY MOUTH EVERY MORNING BEFORE BREAKFAST. AT LEAST 30 MINUTES BEFORE BREAKFAST 9/8/21   Ya Shafer MD   methocarbamol (Robaxin) 500 MG tablet 1 tid prn back spasms 11/2/20   Ya Shafer MD   omeprazole (priLOSEC) 20 MG capsule Take 20 mg by mouth Daily.    Provider, MD Ynes   temazepam (RESTORIL) 15 MG capsule Take 1 capsule by mouth At Night As Needed for Sleep. 8/2/21   Ya Shafer MD       Allergies:  Atorvastatin, Penicillins, and Pravastatin      Review of system  Review of Systems   Neurological: Negative for speech  difficulty, weakness and numbness.   All other systems reviewed and are negative.      Vitals:    10/18/21 0728   BP: 115/84   Pulse: 68   Resp: 18   Temp: 98.1 °F (36.7 °C)   SpO2: 97%       Physical exam  Physical Exam  Constitutional:       Appearance: He is well-developed.   Cardiovascular:      Rate and Rhythm: Normal rate and regular rhythm.   Pulmonary:      Effort: Pulmonary effort is normal.   Neurological:      General: No focal deficit present.      Mental Status: He is alert and oriented to person, place, and time.      Comments: Speech clear, VFF, no facial droop, no limb drift, gets up and walks w/o help.   Psychiatric:         Behavior: Behavior normal.         Thought Content: Thought content normal.           Lab Results   Component Value Date    WBC 8.06 10/17/2021    HGB 15.1 10/17/2021    HCT 46.0 10/17/2021    MCV 92.0 10/17/2021     10/17/2021     Lab Results   Component Value Date    GLUCOSE 111 (H) 10/17/2021    BUN 15 10/17/2021    CREATININE 0.95 10/17/2021    EGFRIFNONA 80 10/17/2021    BCR 15.8 10/17/2021    CO2 23.0 10/17/2021    CALCIUM 9.8 10/17/2021    ALBUMIN 4.90 10/17/2021    AST 27 10/17/2021    ALT 29 10/17/2021   Lipid Panel  Order: [id number]   Status: Final result     Visible to patient: Yes (seen)     Next appt: 11/03/2021 at 09:45 AM in Internal Medicine (Ya Shafer MD)    Specimen Information: Blood         0 Result Notes     1  Topic    Component   Ref Range & Units 04:39 8 mo ago 1 yr ago 3 yr ago 5 yr ago   Total Cholesterol   0 - 200 mg/dL 173  186  176  181  215 High     Triglycerides   0 - 150 mg/dL 105  149  152 High   178 High   121    HDL Cholesterol   40 - 60 mg/dL 41  41  39 Low   39 Low   46    LDL Cholesterol    0 - 100 mg/dL 113 High   118 High   107 High   122 R  136 High  R    VLDL Cholesterol   5 - 40 mg/dL 19  27  30.4      LDL/HDL Ratio  2.71  2.81  2.73   3.15         Hemoglobin A1C   4.80 - 5.60 % 5.30          Echocardiogram  Findings    Left Ventricle Calculated left ventricular EF = 66% Estimated left ventricular EF = 70% Left ventricular ejection fraction appears to be 66 - 70%. Left ventricular systolic function is normal.   Normal left ventricular cavity size and wall thickness noted. All left ventricular wall segments contract normally. Left ventricular diastolic function was normal.   Right Ventricle Normal right ventricular cavity size, wall thickness, systolic function and septal motion noted.   Left Atrium Normal left atrial size and volume noted.  Saline test results are negative.   Right Atrium Normal right atrial cavity size noted.   Aortic Valve The aortic valve is structurally normal with no regurgitation or stenosis present.   Mitral Valve The mitral valve is structurally normal with no regurgitation or significant stenosis present.   Tricuspid Valve The tricuspid valve is structurally normal with no significant stenosis present. Mild tricuspid valve regurgitation is present. Calculated right ventricular systolic pressure from tricuspid regurgitation is 23.1 mmHg.   Pulmonic Valve The pulmonic valve is structurally normal with no regurgitation or significant stenosis present.   Greater Vessels No dilation of the aortic root is present. No dilation of the sinuses of Valsalva is present.   Pericardium The pericardium is normal. There is no evidence of pericardial effusion. .         Radiological Studies:  Adult Transthoracic Echo Complete W/ Cont if Necessary Per Protocol (With Agitated Saline)    Result Date: 10/17/2021  · Estimated left ventricular EF = 70% Left ventricular ejection fraction appears to be 66 - 70%. Left ventricular systolic function is normal. · Left ventricular diastolic function was normal. · Saline test results are negative.      CT Angiogram Neck    Result Date: 10/17/2021  EXAMINATION: CT ANGIOGRAM NECK - 10/17/2021  INDICATION: Follow up stroke.  TECHNIQUE: Pre and postcontrast 3 mm and 0.75 mm  axial images through the neck with sagittal and coronal 2-D reconstructions and 3-D VRT and MIP angiographic reconstructions.  The radiation dose reduction device was turned on for each scan per the ALARA (As Low as Reasonably Achievable) protocol.  COMPARISON: None  FINDINGS: Reconstruction images show prominent ICA infundibula bilaterally, and tortuosity but no evidence of significant carotid stenosis in the neck.  Axial thin section images show limited visualization of the proximal subclavian arteries but no obvious stenosis.  On the right, no evidence of hemodynamically significant common, internal, or external carotid artery stenosis is seen to the level of the skull base. The ectatic right ICA infundibulum measures up to 12 mm in diameter. No significant thrombus is appreciated.  On the left, no significant common carotid artery stenosis is seen. There is again a large ICA infundibulum as on the contralateral side, and on the left this measures up to 12 mm in diameter. There is no evidence of underlying thrombus or calcification. No evidence of significant ICA or ECA stenosis is identified to the level of skull base.  Vertebral arteries are moderate in size, difficult to evaluate well proximally due to streak artifact, but grossly normal in appearance elsewhere.  Included lung apices and superior mediastinum appear grossly normal. No evidence of significant incidental soft tissue neck pathology is appreciated.      1. Ectatic right and left ICA infundibula, up to 12 mm bilaterally. No evidence of atherosclerosis/thrombus.  2. No evidence of hemodynamically significant carotid or vertebral artery stenosis in the neck.  DICTATED:   10/17/2021 EDITED/lfs:   10/17/2021       MRI Brain Without Contrast    Result Date: 10/18/2021  EXAMINATION: MRI BRAIN WO CONTRAST-  INDICATION: left sided numbness since 1200,  balance difficulty x 1 week; I63.9-Cerebral infarction, unspecified  TECHNIQUE: Multiplanar multisequence  MRI of the brain performed without IV contrast  COMPARISON: CT head 1 day prior  FINDINGS: No acute infarct noted on diffusion weighted sequences. Midline structures are unremarkable and the craniocervical junction is satisfactory in appearance. Minimal T2 hyperintense periventricular white matter changes present compatible with likely age-related microvascular ischemia. There is also a lenticular finding following CSF on all sequences measuring 3.4 x 1.6 cm in the sagittal plane, likely representing an incidental arachnoid cyst. There is otherwise no evidence of intracranial hemorrhage, mass or mass effect. The ventricles are normal in size and configuration. The orbits are unremarkable and the paranasal sinuses are grossly clear. Intracranial arterial flow voids are maintained.      Likely incidental CSF intensity lenticular finding along the left perirolandic cortex, probably an \arachnoid cyst. Otherwise essentially normal noncontrast MRI of the brain for patient age, with no evidence of acute infarct, hemorrhage, parenchymal mass or mass effect.   This report was finalized on 10/18/2021 8:49 AM by Boy De Jesus.      XR Chest 1 View    Result Date: 10/18/2021  EXAMINATION: XR CHEST 1 VW - 10/17/2021  INDICATION: Left arm numbness, left leg numbness. Stroke protocol.  COMPARISON: None  FINDINGS: The heart, mediastinum and pulmonary vasculature appear within normal limits. Lungs are well-expanded and show some generalized coarsening of the pulmonary interstitial markings, but no significant focal lung disease is seen. No effusion or pneumothorax is seen.      Mild chronic appearing interstitial lung changes. No acute chest disease is identified.  DICTATED:   10/17/2021 EDITED/lfs:   10/17/2021    This report was finalized on 10/18/2021 9:35 AM by Dr. Jag Dougherty MD.      CT Head Without Contrast Stroke Protocol    Result Date: 10/17/2021  EXAMINATION: CT HEAD WO CONTRAST - 10/17/2021  INDICATION: Left-sided  numbness. Evaluate for stroke.  TECHNIQUE: 5 mm unenhanced images through the brain. Stroke protocol.  The radiation dose reduction device was turned on for each scan per the ALARA (As Low as Reasonably Achievable) protocol.  COMPARISON: None  FINDINGS: The calvarium appears intact. Included paranasal sinuses and mastoids appear clear. Soft tissue window images show no evidence of hemorrhage, contusion or edema, and no evidence of mass or mass effect, acute infarction, or hydrocephalus. At the skull vertex on the left, there is focal prominence of the CSF over a roughly 3 x 1 cm area, whether a small developmental irregularity of the cortex, small arachnoid cyst, or evidence of inflammation from previous infarct, but the adjacent parenchyma appears grossly normal. No similar findings are seen elsewhere.      1. No evidence of acute infarction or other acute intrapelvic disease. 2. Incidentally noted mild focal prominence of the left frontoparietal CSF space, whether from prior insult, developmental variant, or whether representing a small arachnoid cyst.  DICTATED:   10/17/2021 EDITED/lfs:   10/17/2021       CT Angiogram Head w AI Analysis of LVO    Result Date: 10/17/2021  EXAMINATION: CT ANGIOGRAM HEAD W AI ANALYSIS OF LVO - 10/17/2021  INDICATION: Follow up stroke.  TECHNIQUE: Pre and postcontrast 0.75 mm and 3 mm axial images through the brain with sagittal and coronal 2-D reconstructions and 3-D VRT and MIP angiographic reconstructions.  The radiation dose reduction device was turned on for each scan per the ALARA (As Low as Reasonably Achievable) protocol.  AI analysis of LVO was utilized.  FINDINGS: There is no evidence of hemodynamically significant stenosis of the inferior portions of the internal carotid arteries. Anterior and middle cerebral arteries and proximal branches appear normal in caliber.  The vertebral arteries and basilar artery and posterior cerebral arteries are relatively small in caliber,  apparently due to the presence of fairly large bilateral posterior connecting arteries. No significant focal stenosis is seen.      1. No evidence of hemodynamically significant intracranial vascular stenosis. 2. Small basilar artery and vertebral arteries, apparently due to the presence of bilateral posterior communicating arteries supplying most of the flow to the posterior cerebral arteries.  DICTATED:   10/17/2021 EDITED/lfs:   10/17/2021        CT CEREBRAL PERFUSION WITH & WITHOUT CONTRAST    Result Date: 10/17/2021  EXAMINATION: CT CEREBRAL PERFUSION WWO CONTRAST - 10/17/2021  INDICATION: Neuro deficit.  TECHNIQUE: Cerebral perfusion analysis was performed using computed tomography with IV contrast administration including postprocessing of parametric maps with determination of cerebral blood flow, cerebral blood volume, mean transit time and time to drain and T-max.  The radiation dose reduction device was turned on for each scan per the ALARA (As Low as Reasonably Achievable) protocol.  COMPARISON: Unenhanced CT scan of same date  FINDINGS: Rapid analysis images show no areas of the brain with cerebral blood flow less than 30%. Focal area shown as potential ischemia, with T-max greater than 6 seconds is noted in the medial right parieto-occipital region, with no corresponding CT scan abnormality. Individual perfusion maps show no consistent focal abnormality of blood flow to suggest significant ischemia or infarct.      No evidence of large vessel occlusion or ischemia. Rapid analysis images with questionable small focus of ischemia in the right temporal occipital region, potentially artifactual.  DICTATED:   10/17/2021 EDITED/lfs:   10/17/2021           During this visit the following were done:  Labs Reviewed [x]    Labs Ordered []    Radiology Reports Reviewed [x]    Radiology Ordered []    EKG, echo, and/or stress test reviewed [x]    EEG results reviewed  []    EEG reviewed and interpreted per myself    []    Discussed case with neurointerventionalist or neuroradiologist []    Referring Provider Records Reviewed []    ER Records Reviewed []    Hospital Records Reviewed []    History Obtained From Family []    Radiological images view and Interpreted per myself [x]    Case Discussed with referring provider []     Decision to obtain and request outside records  []        Assessment and Plan     1. TIA, negative CTA and TTE.   - Home on DAPT then aspirin 81 mg.   - High dose statin.   - BP<130/80.   - ZIO patch.   - Outpatient neurology F/U in 3 months.    2. Incidental left parietal arachnoid cyst.   - MRI brain w/o in 3-4 months.   - EEG (prelim negative).      Call for questions, will see prn. Thanks,          Electronically signed by John Ryan MD on 10/18/2021 at 10:01 EDT

## 2021-10-18 NOTE — CASE MANAGEMENT/SOCIAL WORK
Discharge Planning Assessment  [facility]     Patient Name: Tyler Doss  MRN: [id number]  Today's Date: 10/18/2021    Admit Date: 10/17/2021     Discharge Needs Assessment     Row Name 10/18/21 0925       Living Environment    Lives With spouse    Name(s) of Who Lives With Patient Mandeep    Current Living Arrangements home/apartment/condo    Primary Care Provided by self    Family Caregiver if Needed spouse       Transition Planning    Patient/Family Anticipates Transition to home    Transportation Anticipated family or friend will provide       Discharge Needs Assessment    Readmission Within the Last 30 Days no previous admission in last 30 days    Equipment Currently Used at Home none    Concerns to be Addressed discharge planning               Discharge Plan     Row Name 10/18/21 0925       Plan    Plan home    Patient/Family in Agreement with Plan yes    Plan Comments I met with Mr. Doss at the bedside. He lives in [facility] with his wife Mandeep. He is independent with mobility and all self care. He drives himself when he leaves the home. He is admitted for evaluation of dizziness/parathesias. PT/OT have been consulted. Case management will follow up with their post discharge recommendations.    Final Discharge Disposition Code 01 - home or self-care              Continued Care and Services - Admitted Since 10/17/2021    Coordination has not been started for this encounter.          Demographic Summary     Row Name 10/18/21 0924       General Information    General Information Comments I confirmed that Ya Shafer is Mr. Doss's PCP. [employer] is his insurer.               Functional Status     Row Name 10/18/21 0924       Functional Status, IADL    Medications independent    Meal Preparation independent    Housekeeping independent    Laundry independent    Shopping independent       Employment/    Employment Status retired               Psychosocial    No  documentation.                Abuse/Neglect    No documentation.                Legal    No documentation.                Substance Abuse    No documentation.                Patient Forms    No documentation.                   Jaspreet Mayer RN

## 2021-10-18 NOTE — CONSULTS
Order noted for diabetes education per stroke protocol. A1c 5.3%. No history of diabetes noted per chart review. Please reconsult if needed. Thank you.

## 2021-10-18 NOTE — TELEPHONE ENCOUNTER
Caller: NURSE    Relationship to patient:     Best call back number:[phone]    New or established patient?  [] New  [x] Established    Date of discharge: 10-18-21    Facility discharged from:[facility]    PATIENT IS SCHEDULED WITH DEMARIO KRUEGER FOR A HOSPITAL FOLLOW UP.

## 2021-10-18 NOTE — PLAN OF CARE
Goal Outcome Evaluation:  Plan of Care Reviewed With: patient, spouse    SLP evaluation completed. Will sign-off speech language and cognition as patient is at baseline without deficits at this time. Please see note for further details and recommendations.

## 2021-10-18 NOTE — TELEPHONE ENCOUNTER
Caller:  MELODY    Relationship to patient: NURSE    New or established patient?  [x] New  [] Established    Date of discharge: 10-17-21    Facility discharged from:  [facility]    Diagnosis/Symptoms: BALANCE PROBLEM - STROKE-LIKE SYMPTOM    Length of stay (If applicable): 1 DAY    Specialty Only: Did you see a [facility] provider?    [x] Yes  [] No  KATIE HEREDIA    REQUESTED 3 MONTH FOLLOW UP- SCHEDULED W DR. ENCARNACION FOR 1-18-22. MICH FLORES

## 2021-10-18 NOTE — OUTREACH NOTE
Prep Survey      Responses   [facility] facility patient discharged from? [facility]   Is LACE score < 7 ? Yes   Emergency Room discharge w/ pulse ox? No   Eligibility [facility]   Date of Admission 10/17/21   Date of Discharge 10/18/21   Discharge Disposition Home or Self Care   Discharge diagnosis balance problem, numbness, arachnoid cyst   Does the patient have one of the following disease processes/diagnoses(primary or secondary)? Other   Does the patient have Home health ordered? No   Is there a DME ordered? No   Prep survey completed? Yes          Jayla Webster RN

## 2021-10-18 NOTE — DISCHARGE SUMMARY
[facility] Medicine Services  DISCHARGE SUMMARY    Patient Name: Tyler Doss  : 1960  MRN: [id number]    Date of Admission: 10/17/2021  1:39 PM  Date of Discharge:  10/18/21 15:41  Primary Care Physician: Ya Shafer MD    Consults     No orders found for last 30 day(s).          Hospital Course     Presenting Problem:   Weakness [R53.1]    Active Hospital Problems    Diagnosis  POA   • Arachnoid cyst [G93.0]  Yes   • Chronic constipation [K59.09]  Yes   • Primary insomnia [F51.01]  Yes   • Anxiety [F41.9]  Yes      Resolved Hospital Problems    Diagnosis Date Resolved POA   • **Balance problem [R26.89] 10/18/2021 Yes   • Numbness and tingling [R20.0, R20.2] 10/18/2021 Yes          Hospital Course:  Tyler Doss is a 60 y.o. male never smoker with HLD, chronic constipation, insomnia who presented to the hospital for evaluation of paresthesias and balance difficulties.  Approximately 1 week ago he noticed his balance was off while walking and that he felt like his legs were not doing what he wanted them to do, he describes it as a feeling of walking on a sponge.  Initially he attributed this to recently moving and carrying a lot of heavy objects. This has persisted throughout the week. 10/17/21 he developed a numbness/tingling feeling on his left forearm and left thigh which raised concern in combination with the balance issues prompting evaluation in the ED. CT brain was negative. CTA head/neck with small basilar and vertebral arteries. MRI showed an arachnoid cyst, which neurology would like followed as an outpatient. EEG was negative. ECHO was wnl. The patient was seen and evaluated by neurology, who recommends Plavix 75mg x 1 month with Aspirin, then Aspirin only. Patient has a previous intolerance to statins due to muscle cramping. He is willing to try Rosuvastatin 20mg and this has been started. The patient now has a Zio patch (heart monitor) per  neurology as well. Patient is to follow up with neurology in 1-2 months. Patient is stable for discharge at this time.    This patient's problems and plans were partially entered by my partner and updated as appropriate by me 10/18/21.    Discharge Follow Up Recommendations for outpatient labs/diagnostics:  Take Plavix 75mg PO daily x 30 days then stop.  Take Aspirin 81mg daily indefinitely.  Take Rosuvastatin 20mg PO daily indefinitely.  F/u with neurology in 1-2 months.    Day of Discharge     HPI:   Patient states he is feeling much better today and like he is back to his baseline. No acute complaints.    Review of Systems  Gen- No fevers, chills  CV- No chest pain, palpitations  Resp- No cough, dyspnea  GI- No N/V/D, abd pain    Vital Signs:   Temp:  [97.7 °F (36.5 °C)-98.1 °F (36.7 °C)] 98.1 °F (36.7 °C)  Heart Rate:  [65-97] 74  Resp:  [16-18] 18  BP: (115-142)/() 120/83     Physical Exam:  Constitutional: No acute distress, awake, alert  HENT: NCAT, mucous membranes moist  Respiratory: Clear to auscultation bilaterally, respiratory effort normal   Cardiovascular: RRR, no murmurs, rubs, or gallops  Gastrointestinal: Positive bowel sounds, soft, nontender, nondistended  Musculoskeletal: No bilateral ankle edema  Psychiatric: Appropriate affect, cooperative  Neurologic: Oriented x 3, strength symmetric in all extremities, Cranial Nerves grossly intact to confrontation, speech clear  Skin: No rashes    Pertinent  and/or Most Recent Results     LAB RESULTS:      Lab 10/17/21  1415 10/17/21  1349   WBC 8.06  --    HEMOGLOBIN 15.1  --    HEMOGLOBIN, POC  --  16.0   HEMATOCRIT 46.0  --    HEMATOCRIT POC  --  47   PLATELETS 277  --    NEUTROS ABS 4.96  --    IMMATURE GRANS (ABS) 0.03  --    LYMPHS ABS 2.04  --    MONOS ABS 0.81  --    EOS ABS 0.19  --    MCV 92.0  --    PROTIME 12.7  --          Lab 10/18/21  0439 10/17/21  1415 10/17/21  1349   SODIUM  --  133*  --    POTASSIUM  --  4.2  --    CHLORIDE  --  97*   --    CO2  --  23.0  --    ANION GAP  --  13.0  --    BUN  --  15  --    CREATININE  --  0.95 1.00   GLUCOSE  --  111*  --    CALCIUM  --  9.8  --    MAGNESIUM  --  2.0  --    HEMOGLOBIN A1C 5.30  --   --    TSH  --  2.090  --          Lab 10/17/21  1415   TOTAL PROTEIN 7.3   ALBUMIN 4.90   GLOBULIN 2.4   ALT (SGPT) 29   AST (SGOT) 27   BILIRUBIN 0.3   ALK PHOS 58         Lab 10/17/21  1415   TROPONIN T <0.010   PROTIME 12.7   INR 0.98         Lab 10/18/21  0439   CHOLESTEROL 173   LDL CHOL 113*   HDL CHOL 41   TRIGLYCERIDES 105             Lab 10/17/21  1349   PH, ARTERIAL 7.40     Brief Urine Lab Results  (Last result in the past 365 days)      Color   Clarity   Blood   Leuk Est   Nitrite   Protein   CREAT   Urine HCG        10/17/21 1502 Yellow   Clear   Negative   Negative   Negative   Negative               Microbiology Results (last 10 days)     Procedure Component Value - Date/Time    COVID PRE-OP / PRE-PROCEDURE SCREENING ORDER (NO ISOLATION) - Swab, Nasopharynx [[id number]]  (Normal) Collected: 10/17/21 1732    Lab Status: Final result Specimen: Swab from Nasopharynx Updated: 10/17/21 1823    Narrative:      The following orders were created for panel order COVID PRE-OP / PRE-PROCEDURE SCREENING ORDER (NO ISOLATION) - Swab, Nasopharynx.  Procedure                               Abnormality         Status                     ---------                               -----------         ------                     COVID-19 and FLU A/B PCR...[[id number]]  Normal              Final result                 Please view results for these tests on the individual orders.    COVID-19 and FLU A/B PCR - Swab, Nasopharynx [[id number]]  (Normal) Collected: 10/17/21 1732    Lab Status: Final result Specimen: Swab from Nasopharynx Updated: 10/17/21 1823     COVID19 Not Detected     Influenza A PCR Not Detected     Influenza B PCR Not Detected    Narrative:      Fact sheet for providers:  https://www.fda.gov/media/408942/download    Fact sheet for patients: https://www.fda.gov/media/472444/download    Test performed by PCR.          Adult Transthoracic Echo Complete W/ Cont if Necessary Per Protocol (With Agitated Saline)    Result Date: 10/17/2021  · Estimated left ventricular EF = 70% Left ventricular ejection fraction appears to be 66 - 70%. Left ventricular systolic function is normal. · Left ventricular diastolic function was normal. · Saline test results are negative.      CT Angiogram Neck    Result Date: 10/17/2021  EXAMINATION: CT ANGIOGRAM NECK - 10/17/2021  INDICATION: Follow up stroke.  TECHNIQUE: Pre and postcontrast 3 mm and 0.75 mm axial images through the neck with sagittal and coronal 2-D reconstructions and 3-D VRT and MIP angiographic reconstructions.  The radiation dose reduction device was turned on for each scan per the ALARA (As Low as Reasonably Achievable) protocol.  COMPARISON: None  FINDINGS: Reconstruction images show prominent ICA infundibula bilaterally, and tortuosity but no evidence of significant carotid stenosis in the neck.  Axial thin section images show limited visualization of the proximal subclavian arteries but no obvious stenosis.  On the right, no evidence of hemodynamically significant common, internal, or external carotid artery stenosis is seen to the level of the skull base. The ectatic right ICA infundibulum measures up to 12 mm in diameter. No significant thrombus is appreciated.  On the left, no significant common carotid artery stenosis is seen. There is again a large ICA infundibulum as on the contralateral side, and on the left this measures up to 12 mm in diameter. There is no evidence of underlying thrombus or calcification. No evidence of significant ICA or ECA stenosis is identified to the level of skull base.  Vertebral arteries are moderate in size, difficult to evaluate well proximally due to streak artifact, but grossly normal in appearance  elsewhere.  Included lung apices and superior mediastinum appear grossly normal. No evidence of significant incidental soft tissue neck pathology is appreciated.      1. Ectatic right and left ICA infundibula, up to 12 mm bilaterally. No evidence of atherosclerosis/thrombus.  2. No evidence of hemodynamically significant carotid or vertebral artery stenosis in the neck.  DICTATED:   10/17/2021 EDITED/lfs:   10/17/2021       MRI Brain Without Contrast    Result Date: 10/18/2021  EXAMINATION: MRI BRAIN WO CONTRAST-  INDICATION: left sided numbness since 1200,  balance difficulty x 1 week; I63.9-Cerebral infarction, unspecified  TECHNIQUE: Multiplanar multisequence MRI of the brain performed without IV contrast  COMPARISON: CT head 1 day prior  FINDINGS: No acute infarct noted on diffusion weighted sequences. Midline structures are unremarkable and the craniocervical junction is satisfactory in appearance. Minimal T2 hyperintense periventricular white matter changes present compatible with likely age-related microvascular ischemia. There is also a lenticular finding following CSF on all sequences measuring 3.4 x 1.6 cm in the sagittal plane, likely representing an incidental arachnoid cyst. There is otherwise no evidence of intracranial hemorrhage, mass or mass effect. The ventricles are normal in size and configuration. The orbits are unremarkable and the paranasal sinuses are grossly clear. Intracranial arterial flow voids are maintained.      Likely incidental CSF intensity lenticular finding along the left perirolandic cortex, probably an \arachnoid cyst. Otherwise essentially normal noncontrast MRI of the brain for patient age, with no evidence of acute infarct, hemorrhage, parenchymal mass or mass effect.   This report was finalized on 10/18/2021 8:49 AM by Boy De Jesus.      XR Chest 1 View    Result Date: 10/18/2021  EXAMINATION: XR CHEST 1 VW - 10/17/2021  INDICATION: Left arm numbness, left leg numbness.  Stroke protocol.  COMPARISON: None  FINDINGS: The heart, mediastinum and pulmonary vasculature appear within normal limits. Lungs are well-expanded and show some generalized coarsening of the pulmonary interstitial markings, but no significant focal lung disease is seen. No effusion or pneumothorax is seen.      Mild chronic appearing interstitial lung changes. No acute chest disease is identified.  DICTATED:   10/17/2021 EDITED/lfs:   10/17/2021    This report was finalized on 10/18/2021 9:35 AM by Dr. Jag Dougherty MD.      CT Head Without Contrast Stroke Protocol    Result Date: 10/18/2021  EXAMINATION: CT HEAD WO CONTRAST - 10/17/2021  INDICATION: Left-sided numbness. Evaluate for stroke.  TECHNIQUE: 5 mm unenhanced images through the brain. Stroke protocol.  The radiation dose reduction device was turned on for each scan per the ALARA (As Low as Reasonably Achievable) protocol.  COMPARISON: None  FINDINGS: The calvarium appears intact. Included paranasal sinuses and mastoids appear clear. Soft tissue window images show no evidence of hemorrhage, contusion or edema, and no evidence of mass or mass effect, acute infarction, or hydrocephalus. At the skull vertex on the left, there is focal prominence of the CSF over a roughly 3 x 1 cm area, whether a small developmental irregularity of the cortex, small arachnoid cyst, or evidence of encephalomalacia from previous infarct, but the adjacent parenchyma appears grossly normal. No similar findings are seen elsewhere.      1. No evidence of acute infarction or other acute intrapelvic disease. 2. Incidentally noted mild focal prominence of the left frontoparietal CSF space, whether from prior insult, developmental variant, or whether representing a small arachnoid cyst.  DICTATED:   10/17/2021 EDITED/lfs:   10/17/2021   This report was finalized on 10/18/2021 11:31 AM by Dr. Jag Dougherty MD.      CT Angiogram Head w AI Analysis of LVO    Result Date: 10/17/2021  EXAMINATION:  CT ANGIOGRAM HEAD W AI ANALYSIS OF LVO - 10/17/2021  INDICATION: Follow up stroke.  TECHNIQUE: Pre and postcontrast 0.75 mm and 3 mm axial images through the brain with sagittal and coronal 2-D reconstructions and 3-D VRT and MIP angiographic reconstructions.  The radiation dose reduction device was turned on for each scan per the ALARA (As Low as Reasonably Achievable) protocol.  AI analysis of LVO was utilized.  FINDINGS: There is no evidence of hemodynamically significant stenosis of the inferior portions of the internal carotid arteries. Anterior and middle cerebral arteries and proximal branches appear normal in caliber.  The vertebral arteries and basilar artery and posterior cerebral arteries are relatively small in caliber, apparently due to the presence of fairly large bilateral posterior connecting arteries. No significant focal stenosis is seen.      1. No evidence of hemodynamically significant intracranial vascular stenosis. 2. Small basilar artery and vertebral arteries, apparently due to the presence of bilateral posterior communicating arteries supplying most of the flow to the posterior cerebral arteries.  DICTATED:   10/17/2021 EDITED/lfs:   10/17/2021        CT CEREBRAL PERFUSION WITH & WITHOUT CONTRAST    Result Date: 10/17/2021  EXAMINATION: CT CEREBRAL PERFUSION WWO CONTRAST - 10/17/2021  INDICATION: Neuro deficit.  TECHNIQUE: Cerebral perfusion analysis was performed using computed tomography with IV contrast administration including postprocessing of parametric maps with determination of cerebral blood flow, cerebral blood volume, mean transit time and time to drain and T-max.  The radiation dose reduction device was turned on for each scan per the ALARA (As Low as Reasonably Achievable) protocol.  COMPARISON: Unenhanced CT scan of same date  FINDINGS: Rapid analysis images show no areas of the brain with cerebral blood flow less than 30%. Focal area shown as potential ischemia, with T-max  greater than 6 seconds is noted in the medial right parieto-occipital region, with no corresponding CT scan abnormality. Individual perfusion maps show no consistent focal abnormality of blood flow to suggest significant ischemia or infarct.      No evidence of large vessel occlusion or ischemia. Rapid analysis images with questionable small focus of ischemia in the right temporal occipital region, potentially artifactual.  DICTATED:   10/17/2021 EDITED/lfs:   10/17/2021                 Results for orders placed during the hospital encounter of 10/17/21    Adult Transthoracic Echo Complete W/ Cont if Necessary Per Protocol (With Agitated Saline)    Interpretation Summary  · Estimated left ventricular EF = 70% Left ventricular ejection fraction appears to be 66 - 70%. Left ventricular systolic function is normal.  · Left ventricular diastolic function was normal.  · Saline test results are negative.      Plan for Follow-up of Pending Labs/Results: F/u with neurology in 1-2 months    Discharge Details        Discharge Medications      New Medications      Instructions Start Date   aspirin 81 MG chewable tablet   81 mg, Oral, Daily   Start Date: October 19, 2021     clopidogrel 75 MG tablet  Commonly known as: PLAVIX   75 mg, Oral, Daily, Stop after 30 days   Start Date: October 19, 2021     rosuvastatin 20 MG tablet  Commonly known as: CRESTOR   20 mg, Oral, Nightly         Continue These Medications      Instructions Start Date   albuterol sulfate  (90 Base) MCG/ACT inhaler  Commonly known as: PROVENTIL HFA;VENTOLIN HFA;PROAIR HFA   2 puffs, Inhalation, Every 4 Hours PRN      azelastine 0.1 % nasal spray  Commonly known as: Astelin   2 sprays each mostril bid      clonazePAM 0.5 MG tablet  Commonly known as: KlonoPIN   1 qd prn anxiety      fluticasone 50 MCG/ACT nasal spray  Commonly known as: FLONASE   2 sprays, Nasal, Daily      gabapentin 300 MG capsule  Commonly known as: NEURONTIN   1 qhs       levothyroxine 25 MCG tablet  Commonly known as: SYNTHROID, LEVOTHROID   TAKE ONE TABLET BY MOUTH DAILY      linaclotide 290 MCG capsule capsule  Commonly known as: Linzess   TAKE ONE CAPSULE BY MOUTH EVERY MORNING BEFORE BREAKFAST. AT LEAST 30 MINUTES BEFORE BREAKFAST      methocarbamol 500 MG tablet  Commonly known as: Robaxin   1 tid prn back spasms      omeprazole 20 MG capsule  Commonly known as: priLOSEC   20 mg, Oral, Daily      temazepam 15 MG capsule  Commonly known as: RESTORIL   15 mg, Oral, Nightly PRN             Allergies   Allergen Reactions   • Atorvastatin Myalgia   • Penicillins Rash   • Pravastatin Myalgia         Discharge Disposition:  Home or Self Care    Diet:  Hospital:  Diet Order   Procedures   • Diet Regular; Cardiac       Activity:  Activity Instructions     Activity as Tolerated            Restrictions or Other Recommendations:  none       CODE STATUS:    Code Status and Medical Interventions:   Ordered at: 10/17/21 1602     Level Of Support Discussed With:    Patient     Code Status:    CPR     Medical Interventions (Level of Support Prior to Arrest):    Full       Future Appointments   Date Time Provider Department Center   11/3/2021  9:45 AM Ya Shafer MD [facility]   2/23/2022  1:00 PM Ya Shafer MD [facility]       Additional Instructions for the Follow-ups that You Need to Schedule     Discharge Follow-up with PCP   As directed       Currently Documented PCP:    Ya Shafer MD    PCP Phone Number:    [phone]     Follow Up Details: 7-10 days         Discharge Follow-up with Specified Provider: [facility] in 1-2 months   As directed      To: [facility] in 1-2 months                     Jessie Wagner DO  10/18/21      Time Spent on Discharge:  I spent  33  minutes on this discharge activity which included: face-to-face encounter with the patient, reviewing the data in the system, coordination of the care with the nursing staff as well  as consultants, documentation, and entering orders.

## 2021-10-18 NOTE — THERAPY DISCHARGE NOTE
Acute Care - Occupational Therapy Discharge  [facility]    Patient Name: Tyler Doss  : 1960    MRN: [id number]                              Today's Date: 10/18/2021       Admit Date: 10/17/2021    Visit Dx:     ICD-10-CM ICD-9-CM   1. Acute ischemic stroke (HCC)  I63.9 434.91     Patient Active Problem List   Diagnosis   • Pure hypercholesterolemia   • Primary insomnia   • Anxiety   • Esophageal reflux   • Depressive disorder   • Attention deficit hyperactivity disorder   • Allergic rhinitis   • Dysfunction of eustachian tube   • Obstructive sleep apnea, adult   • Overweight   • Leg cramps   • Hypothyroidism (acquired)   • Chronic constipation   • RLS (restless legs syndrome)   • Numbness and tingling   • Balance problem     Past Medical History:   Diagnosis Date   • Anxiety    • Chronic constipation    • Colon polyp    • Constipation    • Depression    • Diverticulosis 2020    by colon   • Eye exam, routine    • GERD (gastroesophageal reflux disease)    • H/O cardiovascular stress test 10/2015    neg   • H/O colonoscopy 2015     dilated and slightly tortuos colon   • H/O echocardiogram 10/2015    neg   • H/O x-ray of lumbar spine 2015    hypertrophic facet changes l3-s1, significant    • History of dental examination 2012   • Hyperlipidemia    • Prostatitis    • Retinal tear of left eye 2018   • Screening PSA (prostate specific antigen) 2015    0.3   • Seasonal allergies      Past Surgical History:   Procedure Laterality Date   • CATARACT EXTRACTION Right 2016   • COLONOSCOPY  2020    Dr. Ravi   • ENDOSCOPY  2014    neg   • EYE SURGERY Left 2018    retinal tear      General Information     Row Name 10/18/21 1444          OT Time and Intention    Document Type discharge evaluation/summary  -JY     Mode of Treatment occupational therapy; individual therapy  -JY     Row Name 10/18/21 1444          General Information    Patient Profile Reviewed yes  -JY      Prior Level of Function independent:; all household mobility; community mobility; gait; transfer; bed mobility; ADL's; feeding; grooming; dressing; bathing; home management; cooking; cleaning; driving; using stairs  -JY     Barriers to Rehab visual deficit  baseline floater in L eye, hx of detached retina R eye, L eye retina tear  -JY     Row Name 10/18/21 1444          Living Environment    Lives With spouse  -J     Row Name 10/18/21 1444          Home Main Entrance    Number of Stairs, Main Entrance none  -JY     Stair Railings, Main Entrance none  -JY     Row Name 10/18/21 1444          Stairs Within Home, Primary    Stairs, Within Home, Primary 0  -JY     Number of Stairs, Within Home, Primary none  -JY     Stair Railings, Within Home, Primary none  -JY     Stairs Comment, Within Home, Primary pt has step over tub/shower combo and average toilet height, DME: none  -JY     Row Name 10/18/21 1444          Cognition    Orientation Status (Cognition) oriented x 4  -JY     Row Name 10/18/21 1444          Safety Issues, Functional Mobility    Comment, Safety Issues/Impairments (Mobility) pt demonstrates good safety awareness throughout mobility, did not require any AD and able to navigate obstacles, change position, etc  -JY           User Key  (r) = Recorded By, (t) = Taken By, (c) = Cosigned By    Initials Name Provider Type    Sakshi Tyson OT Occupational Therapist               Mobility/ADL's     Row Name 10/18/21 1446          Bed Mobility    Bed Mobility supine-sit; scooting/bridging  -JY     Scooting/Bridging [place] (Bed Mobility) modified independence  -JY     Supine-Sit [place] (Bed Mobility) modified independence  -JY     Assistive Device (Bed Mobility) head of bed elevated  -JY     Comment (Bed Mobility) HOB elevated throughout bed mobility, did not require use of bed rails however available, denies any dizziness or feeling LH at EOB  -JY     Row Name 10/18/21 1446          Transfers     Transfers sit-stand transfer; toilet transfer  -JY     Comment (Transfers) pt demonstrated good safety awareness throughout and further optimal hand placement for controlled ascend, descend, did not utilize any AD  -JY     Sit-Stand [place] (Transfers) independent  -JY     [place] Level (Toilet Transfer) independent  -JY     Assistive Device (Toilet Transfer) commode  no further AD, gait belt donned for safety  -AgillicY     Row Name 10/18/21 1446          Sit-Stand Transfer    Assistive Device (Sit-Stand Transfers) other (see comments)  no AD, gait belt donned for safety  -JY     Row Name 10/18/21 1446          Toilet Transfer    Type (Toilet Transfer) sit-stand; stand-sit  -JY     Row Name 10/18/21 1446          Functional Mobility    Functional Mobility- Ind. Level supervision required; independent  -JY     Functional Mobility- Device other (see comments)  no AD, gait belt donned for safety  -JY     Functional Mobility-Distance (Feet) --  in room distances for ADLs  -JY     Functional Mobility- Comment pt demonstrated good safty awareness throughout in room ADL related mobility without need for I, demo'd ability to navigate obstacles, move laterally, forward, backward and  object from floor without LOB and able to identify stimuli at L and R sides during fxl mobility, denied any dizziness or feeling LH  -JY     Row Name 10/18/21 1446          Activities of Daily Living    BADL Assessment/Intervention upper body dressing; lower body dressing; toileting; grooming  -[facility] Name 10/18/21 1446          Upper Body Dressing Assessment/Training    [place] Level (Upper Body Dressing) doff; don; front opening garment; pajama/robe; independent  -JY     Position (Upper Body Dressing) unsupported sitting; edge of bed sitting  -[facility] Name 10/18/21 1446          Lower Body Dressing Assessment/Training    [place] Level (Lower Body Dressing) doff; don; socks; independent  -JY     Position (Lower Body  Dressing) unsupported sitting; edge of bed sitting  -JY     Row Name 10/18/21 1446          Toileting Assessment/Training    [place] Level (Toileting) perform perineal hygiene; adjust/manage clothing; independent  -JY     Assistive Devices (Toileting) commode  -JY     Position (Toileting) unsupported standing; unsupported sitting  -JY     Row Name 10/18/21 1446          Grooming Assessment/Training    [place] Level (Grooming) wash face, hands; set up  -JY     Position (Grooming) supported sitting  -JY           User Key  (r) = Recorded By, (t) = Taken By, (c) = Cosigned By    Initials Name Provider Type    Sakshi Tyson OT Occupational Therapist               Obj/Interventions     Row Name 10/18/21 1452          Sensory Assessment (Somatosensory)    Sensory Assessment (Somatosensory) bilateral UE; sensation intact  -J     Bilateral UE Sensory Assessment general sensation; light touch awareness; light touch localization; intact  -JY     Row Name 10/18/21 1452          Sensory Interventions    Comment, Sensory Intervention pt denies any numbness or tingling and able to recognize all stimuli presented at BUEs as intact and equal  -J     Row Name 10/18/21 1452          Vision Assessment/Intervention    Visual Impairment/Limitations corrective lenses full-time; other (see comments)  at baseline pt has vision deficits w/ floater L eye, hx detached retina and retina tear at R and L eyes, respectively, still fxl; no acute changes to vision  -     Vision Assessment Comment at baseline pt has vision deficits w/ floater L eye, hx detached retina and retina tear at R and L eyes, respectively, still fxl; no acute changes to vision  -     Row Name 10/18/21 1452          Range of Motion Comprehensive    General Range of Motion bilateral upper extremity ROM WFL  -JY     Comment, General Range of Motion BUE AROM WFL  -JY     Row Name 10/18/21 1452          Strength Comprehensive (MMT)    General Manual Muscle  Testing (MMT) Assessment no strength deficits identified  -JYARITZA     Comment, General Manual Muscle Testing (MMT) Assessment gross MMS 5/5 throughout BUEs  -JY     Row Name 10/18/21 7410          Balance    Balance Assessment sitting static balance; sitting dynamic balance; standing static balance; standing dynamic balance  -JY     Static Sitting Balance WFL; supported; unsupported; sitting, edge of bed  -JY     Dynamic Sitting Balance WFL; unsupported; sitting, edge of bed; other (see comments)  LBD  -JY     Static Standing Balance WFL; unsupported; standing  -JY     Dynamic Standing Balance WFL; unsupported; standing; other (see comments)  fxl transfer  -JY     Balance Interventions sitting; standing; static; dynamic; sit to stand; supported; occupation based/functional task  -JY     Comment, Balance pt did not present with any LOB during seated or standing tasks and did not require AD  -JY           User Key  (r) = Recorded By, (t) = Taken By, (c) = Cosigned By    Initials Name Provider Type    Sakshi Tyson OT Occupational Therapist               Goals/Plan     Row Name 10/18/21 1505          Problem Specific Goal 1 (OT)    Problem Specific Goal 1 (OT) Pt will verbalize/demonstrate understanding/competency in home safety/recognizing need for A, care as pt transitions home with A in order to maximize safety and I.  -JY     Time Frame (Problem Specific Goal 1, OT) short term goal (STG); by discharge  -JY     Progress/Outcome (Problem Specific Goal 1, OT) goal met  -JY           User Key  (r) = Recorded By, (t) = Taken By, (c) = Cosigned By    Initials Name Provider Type    Sakshi Tyson OT Occupational Therapist               Clinical Impression     Row Name 10/18/21 7307          Pain Assessment    Additional Documentation Pain Scale: Numbers Pre/Post-Treatment (Group)  -RERE     Row Name 10/18/21 1066          Pain Scale: Numbers Pre/Post-Treatment    Pretreatment Pain Rating 0/10 - no pain  -RERE      Posttreatment Pain Rating 0/10 - no pain  -JY     Pre/Posttreatment Pain Comment pt denies any pain throughout, tolerated all OT interventions  -JY     Pain Intervention(s) Repositioned; Ambulation/increased activity  -RERE     Row Name 10/18/21 1455          Plan of Care Review    Plan of Care Reviewed With patient; spouse  -JY     Progress improving  -JY     Outcome Summary OT IE completed post chart review. Pt A & O x 4 and reported no pain throughout. Pt initially presented with L sided paresthesia however resolved prior to OT eval and grossly pt demonstrated I in gross ADLs and related t/f and mobility with no AD used throughout. Pt demonstrated good safety awareness and optimal hand position throughout and presented with good coordination, strength, ROM and sensation at BUEs, no concerns noted or verbalized with good symmetry at L and R UEs. OT educated pt and spouse on good safety in regard to transition home as pt resumes tasks and seeking timely care if s/s arise at home with good understanding and competency noted.  Pt does not require further skilled IPOT POC and recommend home with A at d/c.  -RERE     Row Name 10/18/21 1458          Therapy Assessment/Plan (OT)    Patient/Family Therapy Goal Statement (OT) to increase I in ADLs, related t/f, return to PLOF  -JY     Rehab Potential (OT) good, to achieve stated therapy goals  -JY     Criteria for Skilled Therapeutic Interventions Met (OT) yes; skilled treatment is necessary  -JY     Therapy Frequency (OT) evaluation only  -RERE     Row Name 10/18/21 1455          Therapy Plan Review/Discharge Plan (OT)    Anticipated Discharge Disposition (OT) home with assist  -RERE     Row Name 10/18/21 1459          Vital Signs    Pre Systolic BP Rehab 119  -JY     Pre Treatment Diastolic BP 81  -JY     Post Systolic BP Rehab 111  -JY     Post Treatment Diastolic BP 77  -JY     Pretreatment Heart Rate (beats/min) 79  -JY     Posttreatment Heart Rate (beats/min) 72  -JY     Pre  SpO2 (%) 95  -JY     O2 Delivery Pre Treatment room air  -JY     O2 Delivery Intra Treatment room air  -JY     Post SpO2 (%) 96  -JY     O2 Delivery Post Treatment room air  -JY     Pre Patient Position Supine  -JY     Intra Patient Position Standing  -JY     Post Patient Position Sitting  -JY     Row Name 10/18/21 1455          Positioning and Restraints    Pre-Treatment Position in bed  -JY     Post Treatment Position chair  -JY     In Chair notified nsg; call light within reach; encouraged to call for assist; with family/caregiver; sitting  no alarm engaged and RN aware  -JY           User Key  (r) = Recorded By, (t) = Taken By, (c) = Cosigned By    Initials Name Provider Type    Sakshi Tyson OT Occupational Therapist               Outcome Measures     Row Name 10/18/21 1506          How much help from another is currently needed...    Putting on and taking off regular lower body clothing? 4  -JY     Bathing (including washing, rinsing, and drying) 4  -JY     Toileting (which includes using toilet bed pan or urinal) 4  -JY     Putting on and taking off regular upper body clothing 4  -JY     Taking care of personal grooming (such as brushing teeth) 4  -JY     Eating meals 4  -JY     AM-PAC 6 Clicks Score (OT) 24  -JY     Row Name 10/18/21 1506          Modified [place] Scale    Pre-Stroke Modified [place] Scale 0 - No Symptoms at all.  -JY     Modified [place] Scale 0 - No Symptoms at all.  -JY     Row Name 10/18/21 1506          Functional Assessment    Outcome Measure Options AM-PAC 6 Clicks Daily Activity (OT); Modified [place]  -JY           User Key  (r) = Recorded By, (t) = Taken By, (c) = Cosigned By    Initials Name Provider Type    Sakshi Tyson OT Occupational Therapist              Occupational Therapy Education                 Title: PT OT SLP Therapies (Done)     Topic: Occupational Therapy (Done)     Point: ADL training (Done)     Description:   Instruct learner(s) on proper safety adaptation  and remediation techniques during self care or transfers.   Instruct in proper use of assistive devices.              Learning Progress Summary           Patient Acceptance, E,D, VU,DU by RERE at 10/18/2021 1405   Family Acceptance, E,D, VU,DU by RERE at 10/18/2021 1405                   Point: Precautions (Done)     Description:   Instruct learner(s) on prescribed precautions during self-care and functional transfers.              Learning Progress Summary           Patient Acceptance, E,D, VU,DU by RERE at 10/18/2021 1405   Family Acceptance, E,D, VU,DU by RERE at 10/18/2021 1405                   Point: Body mechanics (Done)     Description:   Instruct learner(s) on proper positioning and spine alignment during self-care, functional mobility activities and/or exercises.              Learning Progress Summary           Patient Acceptance, E,D, VU,DU by RERE at 10/18/2021 1405   Family Acceptance, E,D, VU,DU by RERE at 10/18/2021 1405                               User Key     Initials Effective Dates Name Provider Type Discipline    RERE 06/16/21 -  Sakshi Taylor OT Occupational Therapist OT              OT Recommendation and Plan  Retired Outcome Summary/Treatment Plan (OT)  Anticipated Discharge Disposition (OT): home with assist  Therapy Frequency (OT): evaluation only  Plan of Care Review  Plan of Care Reviewed With: patient, spouse  Progress: improving  Outcome Summary: OT IE completed post chart review. Pt A & O x 4 and reported no pain throughout. Pt initially presented with L sided paresthesia however resolved prior to OT eval and grossly pt demonstrated I in gross ADLs and related t/f and mobility with no AD used throughout. Pt demonstrated good safety awareness and optimal hand position throughout and presented with good coordination, strength, ROM and sensation at BUEs, no concerns noted or verbalized with good symmetry at L and R UEs. OT educated pt and spouse on good safety in regard to transition home as pt  resumes tasks and seeking timely care if s/s arise at home with good understanding and competency noted.  Pt does not require further skilled IPOT POC and recommend home with A at d/c.  Plan of Care Reviewed With: patient, spouse  Outcome Summary: OT IE completed post chart review. Pt A & O x 4 and reported no pain throughout. Pt initially presented with L sided paresthesia however resolved prior to OT eval and grossly pt demonstrated I in gross ADLs and related t/f and mobility with no AD used throughout. Pt demonstrated good safety awareness and optimal hand position throughout and presented with good coordination, strength, ROM and sensation at BUEs, no concerns noted or verbalized with good symmetry at L and R UEs. OT educated pt and spouse on good safety in regard to transition home as pt resumes tasks and seeking timely care if s/s arise at home with good understanding and competency noted.  Pt does not require further skilled IPOT POC and recommend home with A at d/c.     Time Calculation:    Time Calculation- OT     Row Name 10/18/21 1508             Time Calculation- OT    OT Start Time 1405  -JY      OT Received On 10/18/21  -JY              Timed Charges    92365 - OT Therapeutic Activity Minutes 11  -JY              Untimed Charges    OT Eval/Re-eval Minutes 47  -JY              Total Minutes    Timed Charges Total Minutes 11  -JY      Untimed Charges Total Minutes 47  -JY       Total Minutes 58  -JY            User Key  (r) = Recorded By, (t) = Taken By, (c) = Cosigned By    Initials Name Provider Type    Sakshi Tyson OT Occupational Therapist              Therapy Charges for Today     Code Description Service Date Service Provider Modifiers Qty    [id number]  OT THERAPEUTIC ACT EA 15 MIN 10/18/2021 Sakshi Taylor OT GO 1    [id number]  OT EVAL LOW COMPLEXITY 4 10/18/2021 Sakshi Taylor OT GO 1               Sakshi Taylor OT  10/18/2021

## 2021-10-19 ENCOUNTER — OFFICE VISIT (OUTPATIENT)
Dept: CARDIOLOGY | Facility: HOSPITAL | Age: 61
End: 2021-10-19

## 2021-10-19 ENCOUNTER — HOSPITAL ENCOUNTER (OUTPATIENT)
Dept: CARDIOLOGY | Facility: HOSPITAL | Age: 61
Discharge: HOME | End: 2021-10-19
Admitting: NURSE PRACTITIONER

## 2021-10-19 ENCOUNTER — HOSPITAL ENCOUNTER (OUTPATIENT)
Dept: CARDIOLOGY | Facility: HOSPITAL | Age: 61
Discharge: HOME | End: 2021-10-19

## 2021-10-19 ENCOUNTER — TRANSITIONAL CARE MANAGEMENT TELEPHONE ENCOUNTER (OUTPATIENT)
Dept: CALL CENTER | Facility: HOSPITAL | Age: 61
End: 2021-10-19

## 2021-10-19 VITALS
BODY MASS INDEX: 25.99 KG/M2 | WEIGHT: 209 LBS | HEART RATE: 67 BPM | DIASTOLIC BLOOD PRESSURE: 73 MMHG | OXYGEN SATURATION: 97 % | RESPIRATION RATE: 18 BRPM | SYSTOLIC BLOOD PRESSURE: 120 MMHG | HEIGHT: 75 IN | TEMPERATURE: 97.8 F

## 2021-10-19 DIAGNOSIS — G45.9 TIA (TRANSIENT ISCHEMIC ATTACK): Primary | ICD-10-CM

## 2021-10-19 DIAGNOSIS — G45.9 TIA (TRANSIENT ISCHEMIC ATTACK): ICD-10-CM

## 2021-10-19 DIAGNOSIS — E78.00 PURE HYPERCHOLESTEROLEMIA: ICD-10-CM

## 2021-10-19 PROCEDURE — 93270 REMOTE 30 DAY ECG REV/REPORT: CPT

## 2021-10-19 PROCEDURE — 99214 OFFICE O/P EST MOD 30 MIN: CPT | Performed by: NURSE PRACTITIONER

## 2021-10-19 NOTE — OUTREACH NOTE
Call Center TCM Note      Responses   [facility] patient discharged from? [facility]   Does the patient have one of the following disease processes/diagnoses(primary or secondary)? Other   TCM attempt successful? Yes   Call start time 0926   Call end time 0928   Discharge diagnosis balance problem, numbness, arachnoid cyst   Person spoke with today (if not patient) and relationship Patient   Meds reviewed with patient/caregiver? Yes   Is the patient having any side effects they believe may be caused by any medication additions or changes? No   Does the patient have all medications ordered at discharge? Yes   Is the patient taking all medications as directed (includes completed medication regime)? Yes   Does the patient have a primary care provider?  Yes   Does the patient have an appointment with their PCP within 7 days of discharge? Yes   Comments regarding [facility] fu appt on 10/25/21 at 1:15 PM   Has the patient kept scheduled appointments due by today? N/A   Psychosocial issues? No   Did the patient receive a copy of their discharge instructions? Yes   Nursing interventions Reviewed instructions with patient   What is the patient's perception of their health status since discharge? Improving   Is the patient/caregiver able to teach back signs and symptoms related to disease process for when to call PCP? Yes   Is the patient/caregiver able to teach back signs and symptoms related to disease process for when to call 911? Yes   Is the patient/caregiver able to teach back the hierarchy of who to call/visit for symptoms/problems? PCP, Specialist, Home health nurse, Urgent Care, ED, 911 Yes   If the patient is a current smoker, are they able to teach back resources for cessation? Not a smoker   TCM call completed? Yes   Wrap up additional comments Pt states he is doing really good. States he could not be happier with his care he received. Pt verified [facility] fu appt on 10/25/21. No questions/concerns.           Allison Doss RN    10/19/2021, 09:30 EDT

## 2021-10-22 ENCOUNTER — TELEPHONE (OUTPATIENT)
Dept: INTERNAL MEDICINE | Facility: CLINIC | Age: 61
End: 2021-10-22

## 2021-10-22 RX ORDER — SILDENAFIL 50 MG/1
50 TABLET, FILM COATED ORAL DAILY PRN
Qty: 10 TABLET | Refills: 11 | Status: SHIPPED | OUTPATIENT
Start: 2021-10-22 | End: 2022-10-31

## 2021-10-29 ENCOUNTER — TELEPHONE (OUTPATIENT)
Dept: INTERNAL MEDICINE | Facility: CLINIC | Age: 61
End: 2021-10-29

## 2021-10-29 RX ORDER — ROSUVASTATIN CALCIUM 5 MG/1
5 TABLET, COATED ORAL DAILY
Qty: 90 TABLET | Refills: 1 | Status: SHIPPED | OUTPATIENT
Start: 2021-10-29 | End: 2022-05-09 | Stop reason: SDUPTHER

## 2021-10-29 NOTE — TELEPHONE ENCOUNTER
Received a refill request from [facility] for his crestor.  I spoke to patient to see if this is where he wanted it sent.  He does want it sent to his mail order service.  I have rest to [facility].

## 2021-11-01 NOTE — PROGRESS NOTES
"Chief Complaint  Transient Ischemic Attack and Establish Care    Subjective    History of Present Illness {CC  Problem List  Visit  Diagnosis   Encounters  Notes  Medications  Labs  Result Review Imaging  Media :23}       History of Present Illness   61-year-old man presents the office today for ongoing evaluation of his recent TIA. He was hospitalized at [facility] 10/17/2021 to 10/18/2021 with numbness and tingling and a balance problem.  Patient has a history of hyperlipidemia, chronic constipation and insomnia.  He presented to the hospital with paresthesias and balance difficulties.  Prior to admission to the hospital he developed numbness and tingling in his left forearm and left thigh.  CT brain was negative.  CTA head neck with small basilar and vertebral arteries.  MRI showed an arachnoid cyst.  Echo within normal limits.  Patient presents today for event monitor placement.  He reports numbness and tingling has resolved.  Objective     Vital Signs:   Vitals:    10/19/21 1431 10/19/21 1432 10/19/21 1433   BP: 124/78 120/77 120/73   BP Location: Right arm Left arm Left arm   Patient Position: Sitting Standing Sitting   Cuff Size: Adult Adult Adult   Pulse: 64 68 67   Resp:   18   Temp:   97.8 °F (36.6 °C)   TempSrc:   Temporal   SpO2: 95% 94% 97%   Weight:   94.8 kg (209 lb)   Height:   190.5 cm (75\")     Body mass index is 26.12 kg/m².  Physical Exam  Vitals and nursing note reviewed.   Constitutional:       Appearance: Normal appearance.   HENT:      Head: Normocephalic.   Eyes:      Pupils: Pupils are equal, round, and reactive to light.   Cardiovascular:      Rate and Rhythm: Normal rate and regular rhythm.      Pulses: Normal pulses.      Heart sounds: Normal heart sounds. No murmur heard.      Pulmonary:      Effort: Pulmonary effort is normal.      Breath sounds: Normal breath sounds.   Abdominal:      General: Bowel sounds are normal.      Palpations: Abdomen is soft. "   Musculoskeletal:         General: Normal range of motion.      Cervical back: Normal range of motion.      Right lower leg: No edema.      Left lower leg: No edema.   Skin:     General: Skin is warm and dry.      Capillary Refill: Capillary refill takes less than 2 seconds.   Neurological:      Mental Status: He is alert and oriented to person, place, and time.   Psychiatric:         Mood and Affect: Mood normal.         Thought Content: Thought content normal.              Result Review  Data Reviewed:{ Labs  Result Review  Imaging  Med Tab  Media :23}   Lab Results   Component Value Date    GLUCOSE 111 (H) 10/17/2021    CALCIUM 9.8 10/17/2021     (L) 10/17/2021    K 4.2 10/17/2021    CO2 23.0 10/17/2021    CL 97 (L) 10/17/2021    BUN 15 10/17/2021    CREATININE 0.95 10/17/2021    EGFRIFNONA 80 10/17/2021    BCR 15.8 10/17/2021    ANIONGAP 13.0 10/17/2021     Lab Results   Component Value Date    WBC 8.06 10/17/2021    HGB 15.1 10/17/2021    HCT 46.0 10/17/2021    MCV 92.0 10/17/2021     10/17/2021     October 2021: Echo  · Estimated left ventricular EF = 70% Left ventricular ejection fraction appears to be 66 - 70%. Left ventricular systolic function is normal.  · Left ventricular diastolic function was normal.  · Saline test results are negative.                Assessment and Plan {CC Problem List  Visit Diagnosis  ROS  Review (Popup)  Health Maintenance  Quality  BestPractice  Medications  SmartSets  SnapShot Encounters  Media :23}   1. TIA (transient ischemic attack)  Echo wnl   - Mobile Cardiac Outpatient Telemetry; Future  Continue aspirin, Crestor   2. Pure hypercholesterolemia  Newly started on Crestor          Follow Up {Instructions Charge Capture  Follow-up Communications :23}   Return in about 6 weeks (around 11/30/2021) for Office visit, Monitor results.    Patient was given instructions and counseling regarding his condition or for health maintenance advice. Please  see specific information pulled into the AVS if appropriate.  Patient was instructed to call the [facility] with any questions, concerns, or worsening symptoms.    *Please note that portions of this note were completed with a voice recognition program. Efforts were made to edit the dictations, but occasionally words are mistranscribed.

## 2021-11-03 ENCOUNTER — OFFICE VISIT (OUTPATIENT)
Dept: INTERNAL MEDICINE | Facility: CLINIC | Age: 61
End: 2021-11-03

## 2021-11-03 VITALS
DIASTOLIC BLOOD PRESSURE: 64 MMHG | SYSTOLIC BLOOD PRESSURE: 118 MMHG | OXYGEN SATURATION: 99 % | WEIGHT: 201.6 LBS | TEMPERATURE: 97.1 F | BODY MASS INDEX: 25.07 KG/M2 | HEART RATE: 74 BPM | HEIGHT: 75 IN

## 2021-11-03 DIAGNOSIS — Z23 NEED FOR INFLUENZA VACCINATION: ICD-10-CM

## 2021-11-03 DIAGNOSIS — E78.00 PURE HYPERCHOLESTEROLEMIA: Chronic | ICD-10-CM

## 2021-11-03 DIAGNOSIS — G25.81 RLS (RESTLESS LEGS SYNDROME): Chronic | ICD-10-CM

## 2021-11-03 DIAGNOSIS — E03.9 HYPOTHYROIDISM (ACQUIRED): Chronic | ICD-10-CM

## 2021-11-03 DIAGNOSIS — F51.01 PRIMARY INSOMNIA: Chronic | ICD-10-CM

## 2021-11-03 DIAGNOSIS — G93.0 ARACHNOID CYST: Chronic | ICD-10-CM

## 2021-11-03 DIAGNOSIS — G45.9 TIA (TRANSIENT ISCHEMIC ATTACK): Primary | Chronic | ICD-10-CM

## 2021-11-03 PROCEDURE — 99214 OFFICE O/P EST MOD 30 MIN: CPT | Performed by: INTERNAL MEDICINE

## 2021-11-03 PROCEDURE — 90471 IMMUNIZATION ADMIN: CPT | Performed by: INTERNAL MEDICINE

## 2021-11-03 PROCEDURE — 90686 IIV4 VACC NO PRSV 0.5 ML IM: CPT | Performed by: INTERNAL MEDICINE

## 2021-11-03 RX ORDER — TEMAZEPAM 15 MG/1
15 CAPSULE ORAL NIGHTLY PRN
Qty: 30 CAPSULE | Refills: 2 | Status: CANCELLED | OUTPATIENT
Start: 2021-11-03

## 2021-11-03 NOTE — PROGRESS NOTES
"Chief Complaint  Insomnia, Restless Legs Syndrome (follow up), Hypothyroidism, Hyperlipidemia, and Extremity Weakness (hospital follow up [facility], wearing a heart monitor, possible TIA)    Subjective          Tyler Doss presents to [facility]  History of Present Illness    Here for hospital followup. He was admitted from 10/17/2021 through 10/18/2021 for TIA. Approximately 1 week  prior to admission, he noticed his balance was off while walking and that he felt like his legs were not doing what he wanted them to do. he described it like \" walking on a sponge\".  Initially he attributed this to recently moving and carrying a lot of heavy objects.  On the day of admission, he developed a numbness/tingling feeling on his left forearm and left thigh. EEG was neg, CT brain was negative. CTA head/neck was essentially negative . MRI showed an arachnoid cyst, which neurology would like followed as an outpatient. EEG was negative. ECHO was wnl with EF of 70% and negative saline. neurology saw patient in hospital and recommended Plavix 75mg x 1 month with Aspirin, then Aspirin only.  A statin recommended as well but patient has had trouble in the past with myalgias but has started Crestor 5 mg.  He has seen cardiology, Sherron Andersen, and has had a Zio monitor.  He will follow up with neurology (Dr Alston) in 2 months.   He has felt well since discharge, and legs feel back to normal.  He is doing his usual activities and stays active physically    Insomnia-patient on temazepam 15 mg.  This is working well.  He just filled recently so does not need a refill yet    Restless legs/leg cramps -patient on gabapentin 300 nightly and this dose is working well    Constipation-on Linzess and MiraLAX daily and having a bowel movement every day which has helped    Hypothyroid-on Synthroid 25.  Last TSH was 10/17 in hospital, and was normal at 2    Hyperlipidemia-LDL was 113 in the hospital.he is " "tolerating the crestor so far. He is taking the coq10 as well.    Current Outpatient Medications:   •  albuterol sulfate  (90 Base) MCG/ACT inhaler, Inhale 2 puffs Every 4 (Four) Hours As Needed for Wheezing., Disp: 18 g, Rfl: 1  •  aspirin 81 MG chewable tablet, Chew 1 tablet Daily., Disp: 30 tablet, Rfl: 0  •  azelastine (Astelin) 0.1 % nasal spray, 2 sprays each mostril bid, Disp: 3 each, Rfl: 3  •  clonazePAM (KlonoPIN) 0.5 MG tablet, 1 qd prn anxiety, Disp: 90 tablet, Rfl: 0  •  clopidogrel (PLAVIX) 75 MG tablet, Take 1 tablet by mouth Daily for 30 days. Stop after 30 days, Disp: 30 tablet, Rfl: 0  •  fluticasone (FLONASE) 50 MCG/ACT nasal spray, 2 sprays into the nostril(s) as directed by provider Daily., Disp: , Rfl:   •  gabapentin (NEURONTIN) 300 MG capsule, 1 qhs, Disp: 30 capsule, Rfl: 5  •  levothyroxine (SYNTHROID, LEVOTHROID) 25 MCG tablet, TAKE ONE TABLET BY MOUTH DAILY, Disp: 90 tablet, Rfl: 1  •  linaclotide (Linzess) 290 MCG capsule capsule, TAKE ONE CAPSULE BY MOUTH EVERY MORNING BEFORE BREAKFAST. AT LEAST 30 MINUTES BEFORE BREAKFAST, Disp: 30 capsule, Rfl: 11  •  omeprazole (priLOSEC) 20 MG capsule, Take 20 mg by mouth Daily., Disp: , Rfl:   •  rosuvastatin (Crestor) 5 MG tablet, Take 1 tablet by mouth Daily., Disp: 90 tablet, Rfl: 1  •  sildenafil (VIAGRA) 50 MG tablet, Take 1 tablet by mouth Daily As Needed for Erectile Dysfunction., Disp: 10 tablet, Rfl: 11  •  temazepam (RESTORIL) 15 MG capsule, Take 1 capsule by mouth At Night As Needed for Sleep., Disp: 30 capsule, Rfl: 2  •  methocarbamol (Robaxin) 500 MG tablet, 1 tid prn back spasms, Disp: 45 tablet, Rfl: 3      Objective   Vital Signs:   /64 (BP Location: Right arm, Patient Position: Sitting)   Pulse 74   Temp 97.1 °F (36.2 °C) (Infrared)   Ht 190.5 cm (75\")   Wt 91.4 kg (201 lb 9.6 oz)   SpO2 99%   BMI 25.20 kg/m²       Physical exam  Constitutional: oriented to person, place, and time.  well-developed and " well-nourished. No distress.   HENT:   Head: Normocephalic and atraumatic.   Eyes: Conjunctivae and EOM are normal.   Cardiovascular: Normal rate, regular rhythm and normal heart sounds.  Exam reveals no gallop and no friction rub.    No murmur heard.  Pulmonary/Chest: Effort normal and breath sounds normal. No respiratory distress.   no wheezes.   Neurological:  alert and oriented to person, place, and time.   Skin: Skin is warm and dry. not diaphoretic.   Psychiatric:  normal mood and affect. behavior is normal. Judgment and thought content normal.      Result Review :   The following data was reviewed by: Ya Shafer MD on 11/03/2021:  Common labs    Common Labsle 2/5/21 2/5/21 2/5/21 2/5/21 2/5/21 10/17/21 10/17/21 10/17/21 10/17/21 10/18/21 10/18/21    0810 0810 0810 0810 0810 1349 1349 1415 1415 0439 [id number]   Glucose  83       111 (A)     BUN  12       15     Creatinine  0.98     1.00  0.95     eGFR Non African Am  78       80     Sodium  138       133 (A)     Potassium  4.4       4.2     Chloride  100       97 (A)     Calcium  10.2       9.8     Albumin  4.60       4.90     Total Bilirubin  0.6       0.3     Alkaline Phosphatase  52       58     AST (SGOT)  27       27     ALT (SGPT)  29       29     WBC 5.94       8.06      Hemoglobin 15.9     16.0  15.1      Hematocrit 47.8     47  46.0      Platelets 291       277      Total Cholesterol   186        173   Triglycerides   149        105   HDL Cholesterol   41        41   LDL Cholesterol    118 (A)        113 (A)   Hemoglobin A1C    5.60      5.30    PSA     0.327         (A) Abnormal value       Comments are available for some flowsheets but are not being displayed.           Last Urine Toxicity     LAST URINE TOXICITY RESULTS Latest Ref Rng & Units 8/3/2021 7/29/2020    AMPHETAMINES SCREEN, URINE Negative Negative Negative    BARBITURATES SCREEN Negative Negative Negative    BENZODIAZEPINE SCREEN, URINE Negative Positive(A) Positive(A)    BUPRENORPHINEUR  Negative Negative Negative    COCAINE SCREEN, URINE Negative Negative Negative    METHADONE SCREEN, URINE Negative Negative Negative    METHAMPHETAMINEUR Negative Negative Negative        Data reviewed: Radiologic studies MRI, CT, Cardiology studies Cardiology outpatient note and Recent hospitalization notes Discharge summary          Assessment and Plan    Diagnoses and all orders for this visit:    1. TIA (transient ischemic attack) (Primary)-next he will finish the 30 days of Plavix later this month, and will continue aspirin.  He will return in Zio monitor later this month and has follow-up with both cardiology and neurology    2. Pure hypercholesterolemia-patient tolerating Crestor 5 so far.  We will recheck cholesterol levels next month.  I will put in order  -     Comprehensive Metabolic Panel; Future  -     Lipid Panel; Future    3. Hypothyroidism (acquired)-TSH looked good last month, recheck in 6 to 12 months    4. RLS (restless legs syndrome)- stable on gabapentin      5. Arachnoid cyst-has follow-up with neurology in January    6. Primary insomnia-stable on temazepam.  He will call when he needs a refill.stable on current regimen. he has signed CSC. kumar appropriate. UDS due 8/22    7. Need for influenza vaccination  -     FluLaval/Fluarix/Fluzone >6 Months ([id number])            Follow Up   Return in about 3 months (around 2/3/2022) for Next scheduled follow up.  Patient was given instructions and counseling regarding his condition or for health maintenance advice. Please see specific information pulled into the AVS if appropriate.

## 2021-11-09 DIAGNOSIS — G45.9 TIA (TRANSIENT ISCHEMIC ATTACK): Primary | ICD-10-CM

## 2021-11-14 RX ORDER — HYDROCORTISONE ACETATE AND PRAMOXINE HYDROCHLORIDE 25; 10 MG/G; MG/G
1 CREAM TOPICAL 3 TIMES DAILY
Qty: 28.4 G | Refills: 1 | Status: SHIPPED | OUTPATIENT
Start: 2021-11-14 | End: 2022-03-04

## 2021-11-29 NOTE — PROGRESS NOTES
"Chief Complaint  Follow-up (TIA)    Subjective    History of Present Illness {CC  Problem List  Visit  Diagnosis   Encounters  Notes  Medications  Labs  Result Review Imaging  Media :23}       History of Present Illness   61-year-old male presents the office today for ongoing evaluation of his TIA. Recently wore cardiac event monitor and had echo. Would like to follow up on those results.  Notes he is feeling well and exercising daily.  Currently denies chest pain, dyspnea, palpitations, presyncope, syncope, orthopnea, PND or pedal edema.  Notes blood pressures have been well controlled 1 teens to 120s at home.  Objective     Vital Signs:   Vitals:    11/30/21 1016   BP: 125/81   BP Location: Left arm   Patient Position: Sitting   Cuff Size: Adult   Pulse: 70   Resp: 16   Temp: 97 °F (36.1 °C)   TempSrc: Temporal   SpO2: 98%   Weight: 90.9 kg (200 lb 7 oz)   Height: 190.5 cm (75\")     Body mass index is 25.05 kg/m².  Physical Exam  Vitals and nursing note reviewed.   Constitutional:       Appearance: Normal appearance.   HENT:      Head: Normocephalic.   Eyes:      Pupils: Pupils are equal, round, and reactive to light.   Cardiovascular:      Rate and Rhythm: Normal rate and regular rhythm.      Pulses: Normal pulses.      Heart sounds: Normal heart sounds. No murmur heard.      Pulmonary:      Effort: Pulmonary effort is normal.      Breath sounds: Normal breath sounds.   Abdominal:      General: Bowel sounds are normal.      Palpations: Abdomen is soft.   Musculoskeletal:         General: Normal range of motion.      Cervical back: Normal range of motion.      Right lower leg: No edema.      Left lower leg: No edema.   Skin:     General: Skin is warm and dry.      Capillary Refill: Capillary refill takes less than 2 seconds.   Neurological:      Mental Status: He is alert and oriented to person, place, and time.   Psychiatric:         Mood and Affect: Mood normal.         Thought Content: Thought content " normal.              Result Review  Data Reviewed:{ Labs  Result Review  Imaging  Med Tab  Media :23}     Cardiac event monitor worn for 17 days 2 hours and 5 minutes showed an average heart rate of 70 bpm, PAC/PVC burden less than 1% with no arrhythmias noted           Assessment and Plan {CC Problem List  Visit Diagnosis  ROS  Review (Popup)  Health Maintenance  Quality  BestPractice  Medications  SmartSets  SnapShot Encounters  Media :23}   1. TIA (transient ischemic attack)  Normal cardiac event monitor  Continue asa, crestor   Keep follow up with Neurology January 2022    2. Pure hypercholesterolemia  Stable on crestor         Follow Up {Instructions Charge Capture  Follow-up Communications :23}   Return if symptoms worsen or fail to improve.    Patient was given instructions and counseling regarding his condition or for health maintenance advice. Please see specific information pulled into the AVS if appropriate.  Patient was instructed to call the [facility] with any questions, concerns, or worsening symptoms.

## 2021-11-30 ENCOUNTER — OFFICE VISIT (OUTPATIENT)
Dept: CARDIOLOGY | Facility: HOSPITAL | Age: 61
End: 2021-11-30

## 2021-11-30 VITALS
OXYGEN SATURATION: 98 % | DIASTOLIC BLOOD PRESSURE: 81 MMHG | RESPIRATION RATE: 16 BRPM | BODY MASS INDEX: 24.92 KG/M2 | SYSTOLIC BLOOD PRESSURE: 125 MMHG | WEIGHT: 200.44 LBS | TEMPERATURE: 97 F | HEIGHT: 75 IN | HEART RATE: 70 BPM

## 2021-11-30 DIAGNOSIS — E78.00 PURE HYPERCHOLESTEROLEMIA: ICD-10-CM

## 2021-11-30 DIAGNOSIS — G45.9 TIA (TRANSIENT ISCHEMIC ATTACK): Primary | ICD-10-CM

## 2021-11-30 PROCEDURE — 99213 OFFICE O/P EST LOW 20 MIN: CPT | Performed by: NURSE PRACTITIONER

## 2021-12-01 DIAGNOSIS — F51.01 PRIMARY INSOMNIA: Chronic | ICD-10-CM

## 2021-12-01 RX ORDER — TEMAZEPAM 15 MG/1
15 CAPSULE ORAL NIGHTLY PRN
Qty: 30 CAPSULE | Refills: 2 | Status: SHIPPED | OUTPATIENT
Start: 2021-12-01 | End: 2022-02-23 | Stop reason: SDUPTHER

## 2022-01-11 PROCEDURE — 93272 ECG/REVIEW INTERPRET ONLY: CPT | Performed by: INTERNAL MEDICINE

## 2022-02-07 DIAGNOSIS — G25.81 RLS (RESTLESS LEGS SYNDROME): Chronic | ICD-10-CM

## 2022-02-07 RX ORDER — GABAPENTIN 300 MG/1
CAPSULE ORAL
Qty: 30 CAPSULE | Refills: 5 | Status: SHIPPED | OUTPATIENT
Start: 2022-02-07 | End: 2022-08-08 | Stop reason: SDUPTHER

## 2022-02-08 ENCOUNTER — PRIOR AUTHORIZATION (OUTPATIENT)
Dept: INTERNAL MEDICINE | Facility: CLINIC | Age: 62
End: 2022-02-08

## 2022-02-08 NOTE — TELEPHONE ENCOUNTER
I have tried to initiate a PA on linzess.  Was unable to do on cover my meds so I called his insurance company.  I spoke with Saint in the PA department and she let me know is policy is inactive.  I have sent the patient a my chart message letting him know this.  I had done a PA on this in 2021 and will  on 10/8/22.

## 2022-02-20 NOTE — PROGRESS NOTES
History of Present Illness   Here for a physical    Diet - healthy- Tries to get fruits/veggies and lean proteins and is not eating out.  Centrum daily, D 2/day., glucosmaine, Mg supplement. caffeine coffee in morning but none rest of the day  Exercise - daily - elliptical, bike, bands.      B ears - feels like there may be wax, particularly in the right side    TIA- he has been seen by cardiology and had holter which was normal; feeling well overall, no recurrence    Insomnia - he in on temazepam 15 qhs and sleeping well w/ it.      GERD- on prilosec 20 daily and does help, will occasionally get a little bit of GERD.  He notices that food can get stuck in back of throat at times, tries to drink plenty when he is eating. Last EGD was in '14     Hypothyroid - on synthroid and energy level is pretty good. Has the chornic constipation, no major change in that. Wt stable     Chronic constipation-using linzess, and 1 cap of miralax daily , and will have to use some prune juice once a weekly. Tries to do smaller meals which help some to      Leg cramps at night/RLS- the gabapentin at night helps    Hyperlipidemia - crestor- he has tolerated so far    Current Outpatient Medications on File Prior to Visit   Medication Sig Dispense Refill   • albuterol sulfate  (90 Base) MCG/ACT inhaler Inhale 2 puffs Every 4 (Four) Hours As Needed for Wheezing. 18 g 1   • aspirin 81 MG chewable tablet Chew 1 tablet Daily. 30 tablet 0   • azelastine (Astelin) 0.1 % nasal spray 2 sprays each mostril bid 3 each 3   • clonazePAM (KlonoPIN) 0.5 MG tablet 1 qd prn anxiety 90 tablet 0   • fluticasone (FLONASE) 50 MCG/ACT nasal spray 2 sprays into the nostril(s) as directed by provider Daily.     • gabapentin (NEURONTIN) 300 MG capsule 1 qhs 30 capsule 5   • levothyroxine (SYNTHROID, LEVOTHROID) 25 MCG tablet TAKE ONE TABLET BY MOUTH DAILY 90 tablet 1   • linaclotide (Linzess) 290 MCG capsule capsule TAKE ONE CAPSULE BY MOUTH EVERY MORNING  "BEFORE BREAKFAST. AT LEAST 30 MINUTES BEFORE BREAKFAST 30 capsule 11   • omeprazole (priLOSEC) 20 MG capsule Take 20 mg by mouth Daily.     • rosuvastatin (Crestor) 5 MG tablet Take 1 tablet by mouth Daily. 90 tablet 1   • sildenafil (VIAGRA) 50 MG tablet Take 1 tablet by mouth Daily As Needed for Erectile Dysfunction. 10 tablet 11   • temazepam (RESTORIL) 15 MG capsule Take 1 capsule by mouth At Night As Needed for Sleep. (Patient taking differently: Take 15 mg by mouth Every Night.) 30 capsule 2   • methocarbamol (Robaxin) 500 MG tablet 1 tid prn back spasms 45 tablet 3   • pramoxine-hydrocortisone (Pramosone) 1-2.5 % cream Apply 1 application topically to the appropriate area as directed 3 (Three) Times a Day. 28.4 g 1     No current facility-administered medications on file prior to visit.       The following portions of the patient's history were reviewed and updated as appropriate: allergies, current medications, past family history, past medical history, past social history, past surgical history and problem list.    Review of Systems   Constitutional: Negative for activity change, appetite change, fever, unexpected weight gain and unexpected weight loss.   HENT: Positive for trouble swallowing.    Eyes: Negative.    Respiratory: Negative for shortness of breath and wheezing.    Cardiovascular: Negative for chest pain, palpitations and leg swelling.   Gastrointestinal: Negative.    Endocrine: Negative.    Genitourinary: Negative for difficulty urinating and dysuria.   Musculoskeletal: Positive for arthralgias.   Skin: Negative.    Allergic/Immunologic: Negative for immunocompromised state.   Neurological: Negative for seizures, speech difficulty, memory problem and confusion.   Hematological: Does not bruise/bleed easily.   Psychiatric/Behavioral: Positive for sleep disturbance. Negative for agitation.         Objective   /66   Pulse 75   Temp 96.9 °F (36.1 °C)   Ht 190.5 cm (75\")   Wt 92 kg (202 lb " 12.8 oz)   SpO2 99%   BMI 25.35 kg/m²   Physical Exam   Physical Exam  Vitals and nursing note reviewed.   Constitutional:       General: He is not in acute distress.     Appearance: He is well-developed. He is not diaphoretic.   HENT:      Head: Normocephalic and atraumatic.      Right Ear: External ear normal.      Left Ear: External ear normal.      Ears:      Comments: Very small amount of cerumen in bilateral ears, not causing infection     Nose: Nose normal.      Mouth/Throat:      Pharynx: No oropharyngeal exudate.   Eyes:      General: No scleral icterus.        Right eye: No discharge.         Left eye: No discharge.      Conjunctiva/sclera: Conjunctivae normal.      Pupils: Pupils are equal, round, and reactive to light.   Neck:      Thyroid: No thyromegaly.   Cardiovascular:      Rate and Rhythm: Normal rate and regular rhythm.      Heart sounds: Normal heart sounds. No murmur heard.      Pulmonary:      Effort: Pulmonary effort is normal. No respiratory distress.      Breath sounds: Normal breath sounds. No stridor. No wheezing or rales.   Abdominal:      General: Bowel sounds are normal. There is no distension.      Palpations: Abdomen is soft. There is no mass.      Tenderness: There is no abdominal tenderness. There is no guarding or rebound.   Musculoskeletal:         General: No deformity. Normal range of motion.      Cervical back: Normal range of motion and neck supple.   Lymphadenopathy:      Cervical: No cervical adenopathy.   Skin:     General: Skin is warm and dry.      Coloration: Skin is not pale.      Findings: No erythema or rash.   Neurological:      Mental Status: He is alert and oriented to person, place, and time.      Coordination: Coordination normal.      Deep Tendon Reflexes: Reflexes normal.   Psychiatric:         Behavior: Behavior normal.         Thought Content: Thought content normal.         Judgment: Judgment normal.           Assessment/Plan   Diagnoses and all orders for  this visit:    1. Routine general medical examination at a health care facility (Primary)  -     POC Urinalysis Dipstick, Automated  Regular exercise, healthy diet.   DT due '23  Colon due 8/25 (polyps)  Shingles - shingrex discussed -  can check at pharmacy since we do not have   Hep A - had both  Flu shot - had  covid- he had booster    2. Hypothyroidism (acquired)- tsh good, refilled synthroid  -     TSH; Future    3. Primary insomnia  Comments:  stable on current regimen. he has signed CSC. kumar appropriate. UDS due 8/22  Orders:  -     temazepam (RESTORIL) 15 MG capsule; Take 1 capsule by mouth Every Night.  Dispense: 90 capsule; Refill: 0    4. Other dysphagia  -     Ambulatory referral for Screening EGD    Other orders  -     levothyroxine (SYNTHROID, LEVOTHROID) 25 MCG tablet; Take 1 tablet by mouth Daily.  Dispense: 90 tablet; Refill: 1

## 2022-02-21 ENCOUNTER — LAB (OUTPATIENT)
Dept: LAB | Facility: HOSPITAL | Age: 62
End: 2022-02-21

## 2022-02-21 DIAGNOSIS — E78.00 PURE HYPERCHOLESTEROLEMIA: Chronic | ICD-10-CM

## 2022-02-21 DIAGNOSIS — Z12.5 PROSTATE CANCER SCREENING: ICD-10-CM

## 2022-02-21 DIAGNOSIS — E03.9 HYPOTHYROIDISM (ACQUIRED): ICD-10-CM

## 2022-02-21 LAB
ALBUMIN SERPL-MCNC: 4.9 G/DL (ref 3.5–5.2)
ALBUMIN/GLOB SERPL: 2 G/DL
ALP SERPL-CCNC: 54 U/L (ref 39–117)
ALT SERPL W P-5'-P-CCNC: 28 U/L (ref 1–41)
ANION GAP SERPL CALCULATED.3IONS-SCNC: 7.9 MMOL/L (ref 5–15)
AST SERPL-CCNC: 19 U/L (ref 1–40)
BILIRUB SERPL-MCNC: 0.3 MG/DL (ref 0–1.2)
BUN SERPL-MCNC: 13 MG/DL (ref 8–23)
BUN/CREAT SERPL: 12.7 (ref 7–25)
CALCIUM SPEC-SCNC: 9.7 MG/DL (ref 8.6–10.5)
CHLORIDE SERPL-SCNC: 99 MMOL/L (ref 98–107)
CHOLEST SERPL-MCNC: 126 MG/DL (ref 0–200)
CO2 SERPL-SCNC: 28.1 MMOL/L (ref 22–29)
CREAT SERPL-MCNC: 1.02 MG/DL (ref 0.76–1.27)
GFR SERPL CREATININE-BSD FRML MDRD: 74 ML/MIN/1.73
GLOBULIN UR ELPH-MCNC: 2.4 GM/DL
GLUCOSE SERPL-MCNC: 90 MG/DL (ref 65–99)
HDLC SERPL-MCNC: 40 MG/DL (ref 40–60)
LDLC SERPL CALC-MCNC: 65 MG/DL (ref 0–100)
LDLC/HDLC SERPL: 1.59 {RATIO}
POTASSIUM SERPL-SCNC: 4.3 MMOL/L (ref 3.5–5.2)
PROT SERPL-MCNC: 7.3 G/DL (ref 6–8.5)
SODIUM SERPL-SCNC: 135 MMOL/L (ref 136–145)
TRIGL SERPL-MCNC: 113 MG/DL (ref 0–150)
VLDLC SERPL-MCNC: 21 MG/DL (ref 5–40)

## 2022-02-21 PROCEDURE — 84443 ASSAY THYROID STIM HORMONE: CPT | Performed by: INTERNAL MEDICINE

## 2022-02-21 PROCEDURE — G0103 PSA SCREENING: HCPCS | Performed by: INTERNAL MEDICINE

## 2022-02-21 PROCEDURE — 80053 COMPREHEN METABOLIC PANEL: CPT | Performed by: INTERNAL MEDICINE

## 2022-02-21 PROCEDURE — 80061 LIPID PANEL: CPT | Performed by: INTERNAL MEDICINE

## 2022-02-22 DIAGNOSIS — Z12.5 PROSTATE CANCER SCREENING: Primary | ICD-10-CM

## 2022-02-22 LAB — PSA SERPL-MCNC: 0.32 NG/ML (ref 0–4)

## 2022-02-23 ENCOUNTER — OFFICE VISIT (OUTPATIENT)
Dept: INTERNAL MEDICINE | Facility: CLINIC | Age: 62
End: 2022-02-23

## 2022-02-23 VITALS
BODY MASS INDEX: 25.22 KG/M2 | DIASTOLIC BLOOD PRESSURE: 66 MMHG | OXYGEN SATURATION: 99 % | TEMPERATURE: 96.9 F | WEIGHT: 202.8 LBS | HEIGHT: 75 IN | SYSTOLIC BLOOD PRESSURE: 114 MMHG | HEART RATE: 75 BPM

## 2022-02-23 DIAGNOSIS — R13.19 OTHER DYSPHAGIA: ICD-10-CM

## 2022-02-23 DIAGNOSIS — E03.9 HYPOTHYROIDISM (ACQUIRED): ICD-10-CM

## 2022-02-23 DIAGNOSIS — F51.01 PRIMARY INSOMNIA: Chronic | ICD-10-CM

## 2022-02-23 DIAGNOSIS — Z00.00 ROUTINE GENERAL MEDICAL EXAMINATION AT A HEALTH CARE FACILITY: Primary | ICD-10-CM

## 2022-02-23 LAB
BILIRUB BLD-MCNC: NEGATIVE MG/DL
CLARITY, POC: CLEAR
COLOR UR: YELLOW
EXPIRATION DATE: NORMAL
GLUCOSE UR STRIP-MCNC: NEGATIVE MG/DL
KETONES UR QL: NEGATIVE
LEUKOCYTE EST, POC: NEGATIVE
Lab: NORMAL
NITRITE UR-MCNC: NEGATIVE MG/ML
PH UR: 6.5 [PH] (ref 5–8)
PROT UR STRIP-MCNC: NEGATIVE MG/DL
RBC # UR STRIP: NEGATIVE /UL
SP GR UR: 1.01 (ref 1–1.03)
TSH SERPL DL<=0.05 MIU/L-ACNC: 3.38 UIU/ML (ref 0.27–4.2)
UROBILINOGEN UR QL: NORMAL

## 2022-02-23 PROCEDURE — 81003 URINALYSIS AUTO W/O SCOPE: CPT | Performed by: INTERNAL MEDICINE

## 2022-02-23 PROCEDURE — 99396 PREV VISIT EST AGE 40-64: CPT | Performed by: INTERNAL MEDICINE

## 2022-02-23 RX ORDER — TEMAZEPAM 15 MG/1
15 CAPSULE ORAL NIGHTLY
Qty: 90 CAPSULE | Refills: 0 | Status: SHIPPED | OUTPATIENT
Start: 2022-02-23 | End: 2022-05-09 | Stop reason: SDUPTHER

## 2022-02-23 RX ORDER — LEVOTHYROXINE SODIUM 0.03 MG/1
25 TABLET ORAL DAILY
Qty: 90 TABLET | Refills: 1 | Status: SHIPPED | OUTPATIENT
Start: 2022-02-23 | End: 2022-02-28

## 2022-02-28 ENCOUNTER — TELEPHONE (OUTPATIENT)
Dept: INTERNAL MEDICINE | Facility: CLINIC | Age: 62
End: 2022-02-28

## 2022-02-28 DIAGNOSIS — E03.9 HYPOTHYROIDISM (ACQUIRED): Primary | ICD-10-CM

## 2022-02-28 RX ORDER — LEVOTHYROXINE SODIUM 0.03 MG/1
TABLET ORAL
Qty: 60 TABLET | Refills: 0 | Status: SHIPPED | OUTPATIENT
Start: 2022-02-28 | End: 2022-05-10

## 2022-02-28 NOTE — TELEPHONE ENCOUNTER
Patient's wife called in wondering if he can go up to 50 on Synthroid as TSH was in the 3 range and he is cold all the time and has constipation.  We will have him take 2 of the 25 since we had already sent into [facility] and recheck in 8 weeks

## 2022-03-04 ENCOUNTER — OFFICE VISIT (OUTPATIENT)
Dept: NEUROLOGY | Facility: CLINIC | Age: 62
End: 2022-03-04

## 2022-03-04 VITALS — HEIGHT: 75 IN | WEIGHT: 190 LBS | HEART RATE: 70 BPM | OXYGEN SATURATION: 97 % | BODY MASS INDEX: 23.62 KG/M2

## 2022-03-04 DIAGNOSIS — G45.9 TIA (TRANSIENT ISCHEMIC ATTACK): ICD-10-CM

## 2022-03-04 DIAGNOSIS — G93.0 ARACHNOID CYST: Primary | ICD-10-CM

## 2022-03-04 PROCEDURE — 99214 OFFICE O/P EST MOD 30 MIN: CPT | Performed by: PSYCHIATRY & NEUROLOGY

## 2022-03-04 NOTE — PROGRESS NOTES
Subjective:    CC: Tyler Doss is seen today in consultation at the request of No ref. provider found for Transient Ischemic Attack       HPI:  Patient is a 61-year-old male with past medical history of hyperlipidemia had sudden onset of left hemibody paresthesias back in October 2021.  Following this, he was brought to ED and then eventually admitted for further evaluation.  He underwent MRI brain, CT angiogram of brain and neck which I reviewed personally.  It did not reveal any flow-limiting stenosis or vascular abnormality.  MRI did show an arachnoid cyst involving the left perirolandic cortex.  He was discharged on dual antiplatelet treatment for 30 days and then aspirin monotherapy.  He has been on aspirin 81 mg daily along with Crestor 5 mg daily and reports that he has not had any recurrent symptoms or new focal neurological signs or symptoms.        The following portions of the patient's history were reviewed today and updated as of 03/04/2022  : allergies, social history and problem list.  This document will be scanned to patient's chart.      Current Outpatient Medications:   •  aspirin 81 MG chewable tablet, Chew 1 tablet Daily., Disp: 30 tablet, Rfl: 0  •  azelastine (Astelin) 0.1 % nasal spray, 2 sprays each mostril bid, Disp: 3 each, Rfl: 3  •  clonazePAM (KlonoPIN) 0.5 MG tablet, 1 qd prn anxiety, Disp: 90 tablet, Rfl: 0  •  fluticasone (FLONASE) 50 MCG/ACT nasal spray, 2 sprays into the nostril(s) as directed by provider Daily., Disp: , Rfl:   •  gabapentin (NEURONTIN) 300 MG capsule, 1 qhs, Disp: 30 capsule, Rfl: 5  •  levothyroxine (SYNTHROID, LEVOTHROID) 25 MCG tablet, 2qd (Patient taking differently: 50 mcg. 2qd), Disp: 60 tablet, Rfl: 0  •  linaclotide (Linzess) 290 MCG capsule capsule, TAKE ONE CAPSULE BY MOUTH EVERY MORNING BEFORE BREAKFAST. AT LEAST 30 MINUTES BEFORE BREAKFAST, Disp: 30 capsule, Rfl: 11  •  omeprazole (priLOSEC) 20 MG capsule, Take 20 mg by mouth Daily., Disp: ,  Rfl:   •  rosuvastatin (Crestor) 5 MG tablet, Take 1 tablet by mouth Daily., Disp: 90 tablet, Rfl: 1  •  sildenafil (VIAGRA) 50 MG tablet, Take 1 tablet by mouth Daily As Needed for Erectile Dysfunction., Disp: 10 tablet, Rfl: 11  •  temazepam (RESTORIL) 15 MG capsule, Take 1 capsule by mouth Every Night., Disp: 90 capsule, Rfl: 0   Past Medical History:   Diagnosis Date   • Anxiety    • Chronic constipation    • Colon polyp 2020   • Constipation    • Depression    • Diverticulosis 2020    by colon   • Eye exam, routine 2011   • GERD (gastroesophageal reflux disease)    • H/O cardiovascular stress test 10/2015    neg   • H/O colonoscopy 08/2015     dilated and slightly tortuos colon   • H/O echocardiogram 10/2015    neg   • H/O x-ray of lumbar spine 02/2015    hypertrophic facet changes l3-s1, significant    • History of dental examination 06/2012   • Hyperlipidemia    • Prostatitis    • Retinal tear of left eye 06/2018   • Screening PSA (prostate specific antigen) 11/2015    0.3   • Seasonal allergies       Past Surgical History:   Procedure Laterality Date   • CATARACT EXTRACTION Right 11/29/2016   • COLONOSCOPY  09/20/2020    Dr. Ravi   • ENDOSCOPY  09/2014    neg   • EYE SURGERY Left 06/2018    retinal tear      Family History   Problem Relation Age of Onset   • Ovarian cancer Mother 59        thyroid/goiter   • Thyroid disease Mother         goiter   • Heart attack Father 66        crohn's disease   • Crohn's disease Father    • Diabetes Sister         CVA, hyperlipidemia   • Hypothyroidism Sister    • Stroke Sister 51   • Hyperlipidemia Sister    • Cancer Sister         intestinal; carcinoid   • Atrial fibrillation Sister    • Thyroid disease Brother         partial thyroidectomy benign   • Hyperlipidemia Brother    • Atrial fibrillation Brother    • Colon polyps Brother    • Uterine cancer Paternal Grandmother 40   • Thyroid disease Paternal Grandmother         Mass   • Stroke Paternal Grandfather          "before 60   • Heart attack Paternal Uncle         in 70's   • Heart disease Other    • Depression Other    • Prostate cancer Other    • Ovarian cancer Other    • Immunodeficiency Son         IgA deficiency      Review of Systems   Constitutional: Negative.    HENT: Negative.    Eyes: Negative.    Respiratory: Negative.    Cardiovascular: Negative.    Gastrointestinal: Negative.    Endocrine: Negative.    Genitourinary: Negative.    Musculoskeletal: Negative.    Skin: Negative.    Allergic/Immunologic: Negative.    Neurological: Negative.    Hematological: Negative.    Psychiatric/Behavioral: Negative.        All other systems reviewed and are negative     Objective:    Pulse 70   Ht 190.5 cm (75\")   Wt 86.2 kg (190 lb)   SpO2 97%   BMI 23.75 kg/m²     Neurology Exam:    General apperance: NAD.     Mental status: Alert, awake and oriented to time place and person.    Recent and Remote memory: Can recall 3/3 objects at 5 minutes. Can recall historical events.     Attention span and Concentration: Serial 7s: Normal.     Fund of knowledge:  Normal.     Language and Speech: No aphasia or dysarthria.    Naming , Repitition and Comprehension:  Can name objects, repeat a sentence and follow commands. Speech is clear and fluent with good repetition, comprehension, and naming.    Cranial Nerves:   CN II: Visual fields are full. Intact. Fundi - Normal, No papillederma, Pupils - RUPA  CN III, IV and VI: Extraocular movements are intact. Normal saccades.   CN V: Facial sensation is intact.   CN VII: Muscles of facial expression reveal no asymmetry. Intact.   CN VIII: Hearing is intact. Whispered voice intact.   CN IX and X: Palate elevates symmetrically. Intact  CN XI: Shoulder shrug is intact.   CN XII: Tongue is midline without evidence of atrophy or fasciculation.     Motor:  Right UE muscle strength 5/5. Normal tone.     Left UE muscle strength 5/5. Normal tone.      Right LE muscle strength5/5. Normal tone.     Left LE " muscle strength 5/5. Normal tone.      Sensory: Normal light touch, vibration and pinprick sensation bilaterally.    DTRs: 2+ bilaterally in upper and lower extremities.    Babinski: Negative bilaterally.    Co-ordination: Normal finger-to-nose, heel to shin B/L.    Rhomberg: Negative.    Gait: Normal.    Cardiovascular: Regular rate and rhythm without murmur, gallop or rub.    Ophthalmoscopic exam: Normal fundi, no papilledema.    Assessment and Plan:  1. Arachnoid cyst  2. TIA (transient ischemic attack)  ?  Right hemispheric TIA.  He has not had any more recurrence of left hemibody paresthesias.  He feels back to his baseline.  He denies any new focal neurological signs or symptoms as well.  Continue with aspirin 81 mg daily, Crestor 5 mg daily for secondary stroke prevention.  I have assured him that arachnoid cyst is something congenital and also is benign and there is no requirement for serial imaging.  I will see him back in clinic in 1 year for follow-up.       Return in about 1 year (around 3/4/2023).     José Luis Alston MD

## 2022-03-29 ENCOUNTER — TELEPHONE (OUTPATIENT)
Dept: INTERNAL MEDICINE | Facility: CLINIC | Age: 62
End: 2022-03-29

## 2022-03-29 RX ORDER — PLECANATIDE 3 MG/1
3 TABLET ORAL DAILY
Qty: 30 TABLET | Refills: 11 | Status: SHIPPED | OUTPATIENT
Start: 2022-03-29 | End: 2022-06-24

## 2022-03-29 NOTE — TELEPHONE ENCOUNTER
He tried some trulance samples from GI and working much better than linzess for chronic constipation. He requests Rx for this. Will send in

## 2022-03-30 ENCOUNTER — PRIOR AUTHORIZATION (OUTPATIENT)
Dept: INTERNAL MEDICINE | Facility: CLINIC | Age: 62
End: 2022-03-30

## 2022-03-30 NOTE — TELEPHONE ENCOUNTER
PA Case: [id number] for trulance was approved by his insurance.  Left message on voice mail for the pharmacy letting them know of the approval.  Approved from 3/30/22 - 3/30/23.  Left message for patient letting him know of the approval.

## 2022-04-06 DIAGNOSIS — Z20.822 ENCOUNTER FOR PREPROCEDURE SCREENING LABORATORY TESTING FOR COVID-19: Primary | ICD-10-CM

## 2022-04-06 DIAGNOSIS — Z01.812 ENCOUNTER FOR PREPROCEDURE SCREENING LABORATORY TESTING FOR COVID-19: Primary | ICD-10-CM

## 2022-04-10 ENCOUNTER — CLINICAL SUPPORT NO REQUIREMENTS (OUTPATIENT)
Dept: PREADMISSION TESTING | Facility: HOSPITAL | Age: 62
End: 2022-04-10

## 2022-04-10 DIAGNOSIS — Z20.822 ENCOUNTER FOR PREPROCEDURE SCREENING LABORATORY TESTING FOR COVID-19: ICD-10-CM

## 2022-04-10 DIAGNOSIS — Z01.812 ENCOUNTER FOR PREPROCEDURE SCREENING LABORATORY TESTING FOR COVID-19: ICD-10-CM

## 2022-04-10 LAB — SARS-COV-2 RNA PNL SPEC NAA+PROBE: NOT DETECTED

## 2022-04-10 PROCEDURE — C9803 HOPD COVID-19 SPEC COLLECT: HCPCS

## 2022-04-10 PROCEDURE — U0004 COV-19 TEST NON-CDC HGH THRU: HCPCS

## 2022-04-25 ENCOUNTER — TELEPHONE (OUTPATIENT)
Dept: INTERNAL MEDICINE | Facility: CLINIC | Age: 62
End: 2022-04-25

## 2022-04-25 RX ORDER — AZELASTINE 1 MG/ML
SPRAY, METERED NASAL
Qty: 3 EACH | Refills: 3 | Status: SHIPPED | OUTPATIENT
Start: 2022-04-25 | End: 2023-01-04 | Stop reason: SDUPTHER

## 2022-04-25 NOTE — TELEPHONE ENCOUNTER
----- Message from Tyler Doss sent at 2022  9:45 AM EDT -----  Regarding: refill on Azelastine  Hello.  My Azelastine scrip has .  Will you please send a 30 day scrip to [facility] in [place]?  Thanks.

## 2022-05-05 ENCOUNTER — TELEPHONE (OUTPATIENT)
Dept: INTERNAL MEDICINE | Facility: CLINIC | Age: 62
End: 2022-05-05

## 2022-05-05 NOTE — TELEPHONE ENCOUNTER
Spoke with the pharmacist at [facility] and gave an ok for an early refill.  Will notify patient with my chart message.

## 2022-05-05 NOTE — TELEPHONE ENCOUNTER
----- Message from Ya Shafer MD sent at 5/5/2022 12:15 PM EDT -----  Regarding: FW: refill on Azelastine      ----- Message -----  From: Keila Brooke MA  Sent: 5/5/2022   8:32 AM EDT  To: Ya Shafer MD  Subject: FW: refill on Azelastine                         Is it okay to call in the early refill?  ----- Message -----  From: Tyler Doss  Sent: 5/5/2022   8:12 AM EDT  To: [facility] [address] [facility]  Subject: refill on Azelastine                             I will be out of town for 2 weeks starting on 5/11/2022.  I need to refill the gabapentin 300 mg early for my trip.    WIll you please let the pharmacy know I need to refill it early?  Thanks.

## 2022-05-09 ENCOUNTER — OFFICE VISIT (OUTPATIENT)
Dept: INTERNAL MEDICINE | Facility: CLINIC | Age: 62
End: 2022-05-09

## 2022-05-09 ENCOUNTER — LAB (OUTPATIENT)
Dept: LAB | Facility: HOSPITAL | Age: 62
End: 2022-05-09

## 2022-05-09 VITALS
TEMPERATURE: 97.1 F | OXYGEN SATURATION: 99 % | SYSTOLIC BLOOD PRESSURE: 122 MMHG | DIASTOLIC BLOOD PRESSURE: 76 MMHG | HEART RATE: 70 BPM | BODY MASS INDEX: 25.59 KG/M2 | HEIGHT: 75 IN | WEIGHT: 205.8 LBS

## 2022-05-09 DIAGNOSIS — F51.01 PRIMARY INSOMNIA: Chronic | ICD-10-CM

## 2022-05-09 DIAGNOSIS — E78.00 PURE HYPERCHOLESTEROLEMIA: ICD-10-CM

## 2022-05-09 DIAGNOSIS — H61.22 IMPACTED CERUMEN OF LEFT EAR: Primary | ICD-10-CM

## 2022-05-09 DIAGNOSIS — E03.9 HYPOTHYROIDISM (ACQUIRED): ICD-10-CM

## 2022-05-09 LAB
T4 FREE SERPL-MCNC: 1.5 NG/DL (ref 0.93–1.7)
TSH SERPL DL<=0.05 MIU/L-ACNC: 2.06 UIU/ML (ref 0.27–4.2)

## 2022-05-09 PROCEDURE — 69210 REMOVE IMPACTED EAR WAX UNI: CPT | Performed by: INTERNAL MEDICINE

## 2022-05-09 PROCEDURE — 84439 ASSAY OF FREE THYROXINE: CPT | Performed by: INTERNAL MEDICINE

## 2022-05-09 PROCEDURE — 84443 ASSAY THYROID STIM HORMONE: CPT | Performed by: INTERNAL MEDICINE

## 2022-05-09 PROCEDURE — 99214 OFFICE O/P EST MOD 30 MIN: CPT | Performed by: INTERNAL MEDICINE

## 2022-05-09 RX ORDER — ROSUVASTATIN CALCIUM 5 MG/1
5 TABLET, COATED ORAL DAILY
Qty: 90 TABLET | Refills: 1 | Status: SHIPPED | OUTPATIENT
Start: 2022-05-09 | End: 2022-12-29 | Stop reason: SDUPTHER

## 2022-05-09 RX ORDER — LEVOTHYROXINE SODIUM 0.03 MG/1
50 TABLET ORAL DAILY
Qty: 60 TABLET | Refills: 0 | Status: CANCELLED | OUTPATIENT
Start: 2022-05-09

## 2022-05-09 RX ORDER — TEMAZEPAM 15 MG/1
15 CAPSULE ORAL NIGHTLY
Qty: 90 CAPSULE | Refills: 0 | Status: SHIPPED | OUTPATIENT
Start: 2022-05-09 | End: 2022-08-08 | Stop reason: SDUPTHER

## 2022-05-09 NOTE — PROGRESS NOTES
Answers for HPI/ROS submitted by the patient on 5/8/2022  What is the primary reason for your visit?: Ear Pain  Affected ear: left  Chronicity: recurrent  Onset: 1 to 4 weeks ago  Progression since onset: waxing and waning  Frequency: every few hours  Fever: no fever  Fever duration: less than 1 day  Pain - numeric: 1/10  abdominal pain: No  ear discharge: Yes  rash: No  cough: No  headaches: No  rhinorrhea: No  diarrhea: No  hearing loss: Yes  sore throat: No  neck pain: No  vomiting: No  Chief Complaint  Insomnia (F/u), ear clogged (Left sided ear crackles and feels clogged off and on.  Some nasal drainage.  Sx x 2 weeks.), Hypothyroidism, and Hyperlipidemia    Subjective          Tyler Doss presents to [facility]  History of Present Illness    Left ear has been plugged up and has noticed  decreased hearing, some pressure but not really pain.  He has had some allergy symptoms with the spring but feels like Flonase and Claritin are helping.    Insomnia-patient is on temazepam 15.  Will need refill soon.  This dose is working well    Leg cramps-gabapentin 300 mg at night also has worked well    Hypothyroid-we did raise dose of levothyroxine to 2 of the 25 mcg when we checked blood work last in February as TSH was in the 3 range.  Patient has felt well with this dose.      Current Outpatient Medications:   •  aspirin 81 MG chewable tablet, Chew 1 tablet Daily., Disp: 30 tablet, Rfl: 0  •  azelastine (Astelin) 0.1 % nasal spray, 2 sprays each mostril bid, Disp: 3 each, Rfl: 3  •  clonazePAM (KlonoPIN) 0.5 MG tablet, 1 qd prn anxiety, Disp: 90 tablet, Rfl: 0  •  fluticasone (FLONASE) 50 MCG/ACT nasal spray, 2 sprays into the nostril(s) as directed by provider Daily., Disp: , Rfl:   •  gabapentin (NEURONTIN) 300 MG capsule, 1 qhs, Disp: 30 capsule, Rfl: 5  •  levothyroxine (SYNTHROID, LEVOTHROID) 25 MCG tablet, 2qd (Patient taking differently: 50 mcg. 2qd), Disp: 60 tablet, Rfl:  "0  •  omeprazole (priLOSEC) 20 MG capsule, Take 20 mg by mouth Daily., Disp: , Rfl:   •  Plecanatide (Trulance) 3 MG tablet, Take 1 tablet by mouth Daily., Disp: 30 tablet, Rfl: 11  •  rosuvastatin (Crestor) 5 MG tablet, Take 1 tablet by mouth Daily., Disp: 90 tablet, Rfl: 1  •  sildenafil (VIAGRA) 50 MG tablet, Take 1 tablet by mouth Daily As Needed for Erectile Dysfunction., Disp: 10 tablet, Rfl: 11  •  temazepam (RESTORIL) 15 MG capsule, Take 1 capsule by mouth Every Night., Disp: 90 capsule, Rfl: 0      Objective   Vital Signs:   /76 (BP Location: Right arm, Patient Position: Sitting)   Pulse 70   Temp 97.1 °F (36.2 °C) (Infrared)   Ht 190.5 cm (75\")   Wt 93.4 kg (205 lb 12.8 oz)   SpO2 99%   BMI 25.72 kg/m²       Physical exam  Constitutional: oriented to person, place, and time.  well-developed and well-nourished. No distress.   HENT:   Head: Normocephalic and atraumatic.  Left TM normal, small amount of cerumen in canal which was removed with a curette  Eyes: Conjunctivae and EOM are normal.   Neurological:  alert and oriented to person, place, and time.   Skin: Skin is warm and dry. not diaphoretic.   Psychiatric:  normal mood and affect. behavior is normal. Judgment and thought content normal.      Result Review :            Ear Cerumen Removal    Date/Time: 5/9/2022 12:30 PM  Performed by: Ya Shafer MD  Authorized by: Ya Shafer MD     Anesthesia:  Local Anesthetic: none  Location details: left ear  Procedure type: instrumentation, curette           Assessment and Plan    Diagnoses and all orders for this visit:    1. Impacted cerumen of left ear (Primary)-cerumen removed today with a curette.  Call if any problems  -     Cerumen Removal    2. Primary insomnia  Comments:  stable on current regimen. he has signed CSC. kumar appropriate. UDS due 8/22  Orders:  -     temazepam (RESTORIL) 15 MG capsule; Take 1 capsule by mouth Every Night.  Dispense: 90 capsule; Refill: 0    3. " Hypothyroidism (acquired)-we will recheck thyroid levels on the higher dose    4. Pure hypercholesterolemia-levels look good on Crestor 5  -     rosuvastatin (Crestor) 5 MG tablet; Take 1 tablet by mouth Daily.  Dispense: 90 tablet; Refill: 1        Follow Up   No follow-ups on file.  Patient was given instructions and counseling regarding his condition or for health maintenance advice. Please see specific information pulled into the AVS if appropriate.

## 2022-05-10 RX ORDER — LEVOTHYROXINE SODIUM 0.05 MG/1
50 TABLET ORAL DAILY
Qty: 90 TABLET | Refills: 3 | Status: SHIPPED | OUTPATIENT
Start: 2022-05-10 | End: 2023-01-04 | Stop reason: SDUPTHER

## 2022-06-15 RX ORDER — LEVOTHYROXINE SODIUM 0.03 MG/1
TABLET ORAL
Qty: 30 TABLET | OUTPATIENT
Start: 2022-06-15

## 2022-06-30 DIAGNOSIS — K59.09 CHRONIC CONSTIPATION: ICD-10-CM

## 2022-06-30 RX ORDER — PRUCALOPRIDE 2 MG/1
2 TABLET, FILM COATED ORAL DAILY
Qty: 30 TABLET | Refills: 11 | Status: SHIPPED | OUTPATIENT
Start: 2022-06-30 | End: 2022-07-06 | Stop reason: SINTOL

## 2022-06-30 RX ORDER — PRUCALOPRIDE 1 MG/1
1 TABLET, FILM COATED ORAL DAILY
Qty: 30 TABLET | Refills: 11 | Status: SHIPPED | OUTPATIENT
Start: 2022-06-30 | End: 2022-06-30 | Stop reason: SDUPTHER

## 2022-07-06 ENCOUNTER — TELEPHONE (OUTPATIENT)
Dept: INTERNAL MEDICINE | Facility: CLINIC | Age: 62
End: 2022-07-06

## 2022-07-06 RX ORDER — PLECANATIDE 3 MG/1
3 TABLET ORAL DAILY
Qty: 90 TABLET | Refills: 3 | Status: SHIPPED | OUTPATIENT
Start: 2022-07-06 | End: 2022-12-29

## 2022-07-06 NOTE — TELEPHONE ENCOUNTER
----- Message from Tyler Doss sent at 7/6/2022 11:00 AM EDT -----  Regarding: Motegrity side effects  Good morning.  I tried the Motegrity on Saturday.  I had a severe headache, increased pulse initially,  bad stomach cramps, dizziness and fatigue.  So I won't try this med. again.  Linzess is not   effective for constipation.  The Trulance works well.  I have a few samples I received from Dr. Ravi's office.  Is there any way to approach my insurance about this  situation and see if they will approve the Trulance?  Thanks for your help.

## 2022-07-06 NOTE — TELEPHONE ENCOUNTER
Samples of trulance given: Lot#; [id number], #sp. 9/2022 2 weeks, Lot#: [id number], Exp. 10/2022 3 pills.  Resent in trulance 3 mg for patient to use coupon.  Discontinued motegrity due to side effects.

## 2022-07-07 ENCOUNTER — PRIOR AUTHORIZATION (OUTPATIENT)
Dept: INTERNAL MEDICINE | Facility: CLINIC | Age: 62
End: 2022-07-07

## 2022-07-07 NOTE — TELEPHONE ENCOUNTER
Trulance was approved until 7/7/2023.  PA case: [id number] per Helene at [facility].  Called [facility] and they have been notified of the approval.  Left message with Tyler letting him know of the approval.  The copay charge is 80.00 but he can use a copay card to get the cost lower.

## 2022-08-08 ENCOUNTER — TELEPHONE (OUTPATIENT)
Dept: INTERNAL MEDICINE | Facility: CLINIC | Age: 62
End: 2022-08-08

## 2022-08-08 DIAGNOSIS — G25.81 RLS (RESTLESS LEGS SYNDROME): Chronic | ICD-10-CM

## 2022-08-08 DIAGNOSIS — F51.01 PRIMARY INSOMNIA: Chronic | ICD-10-CM

## 2022-08-08 RX ORDER — TEMAZEPAM 15 MG/1
15 CAPSULE ORAL NIGHTLY
Qty: 90 CAPSULE | Refills: 0 | Status: SHIPPED | OUTPATIENT
Start: 2022-08-08 | End: 2022-11-21 | Stop reason: SDUPTHER

## 2022-08-08 RX ORDER — GABAPENTIN 300 MG/1
CAPSULE ORAL
Qty: 90 CAPSULE | Refills: 0 | Status: SHIPPED | OUTPATIENT
Start: 2022-08-08 | End: 2022-10-28

## 2022-08-08 NOTE — TELEPHONE ENCOUNTER
[facility] is requesting refills on: gabapentin and temazepam.    LV: 5/9/22  NV: 8/11/22  LF: temazepam 5/9/22 90/0; gabapentin 2/7/22 30/5  CSC: 5/9/22  UDS: 8/3/21

## 2022-09-04 ENCOUNTER — APPOINTMENT (OUTPATIENT)
Dept: CARDIOLOGY | Facility: HOSPITAL | Age: 62
End: 2022-09-04

## 2022-09-04 ENCOUNTER — HOSPITAL ENCOUNTER (EMERGENCY)
Facility: HOSPITAL | Age: 62
Discharge: HOME | End: 2022-09-04
Attending: EMERGENCY MEDICINE | Admitting: EMERGENCY MEDICINE

## 2022-09-04 VITALS
SYSTOLIC BLOOD PRESSURE: 120 MMHG | HEART RATE: 66 BPM | OXYGEN SATURATION: 96 % | HEIGHT: 75 IN | DIASTOLIC BLOOD PRESSURE: 83 MMHG | TEMPERATURE: 97.4 F | WEIGHT: 194 LBS | BODY MASS INDEX: 24.12 KG/M2 | RESPIRATION RATE: 16 BRPM

## 2022-09-04 DIAGNOSIS — L03.115 CELLULITIS OF RIGHT THIGH: Primary | ICD-10-CM

## 2022-09-04 LAB
ALBUMIN SERPL-MCNC: 4.9 G/DL (ref 3.5–5.2)
ALBUMIN/GLOB SERPL: 1.9 G/DL
ALP SERPL-CCNC: 64 U/L (ref 39–117)
ALT SERPL W P-5'-P-CCNC: 35 U/L (ref 1–41)
ANION GAP SERPL CALCULATED.3IONS-SCNC: 11 MMOL/L (ref 5–15)
AST SERPL-CCNC: 26 U/L (ref 1–40)
BASOPHILS # BLD AUTO: 0.03 10*3/MM3 (ref 0–0.2)
BASOPHILS NFR BLD AUTO: 0.4 % (ref 0–1.5)
BH CV LOWER VASCULAR LEFT COMMON FEMORAL AUGMENT: NORMAL
BH CV LOWER VASCULAR LEFT COMMON FEMORAL COMPRESS: NORMAL
BH CV LOWER VASCULAR LEFT COMMON FEMORAL PHASIC: NORMAL
BH CV LOWER VASCULAR LEFT COMMON FEMORAL SPONT: NORMAL
BH CV LOWER VASCULAR RIGHT COMMON FEMORAL AUGMENT: NORMAL
BH CV LOWER VASCULAR RIGHT COMMON FEMORAL COMPRESS: NORMAL
BH CV LOWER VASCULAR RIGHT COMMON FEMORAL PHASIC: NORMAL
BH CV LOWER VASCULAR RIGHT COMMON FEMORAL SPONT: NORMAL
BH CV LOWER VASCULAR RIGHT DISTAL FEMORAL AUGMENT: NORMAL
BH CV LOWER VASCULAR RIGHT DISTAL FEMORAL COMPRESS: NORMAL
BH CV LOWER VASCULAR RIGHT DISTAL FEMORAL PHASIC: NORMAL
BH CV LOWER VASCULAR RIGHT DISTAL FEMORAL SPONT: NORMAL
BH CV LOWER VASCULAR RIGHT GASTRONEMIUS COMPRESS: NORMAL
BH CV LOWER VASCULAR RIGHT GREATER SAPH AK COMPRESS: NORMAL
BH CV LOWER VASCULAR RIGHT GREATER SAPH BK COMPRESS: NORMAL
BH CV LOWER VASCULAR RIGHT LESSER SAPH COMPRESS: NORMAL
BH CV LOWER VASCULAR RIGHT MID FEMORAL AUGMENT: NORMAL
BH CV LOWER VASCULAR RIGHT MID FEMORAL COMPRESS: NORMAL
BH CV LOWER VASCULAR RIGHT MID FEMORAL PHASIC: NORMAL
BH CV LOWER VASCULAR RIGHT MID FEMORAL SPONT: NORMAL
BH CV LOWER VASCULAR RIGHT PERONEAL COMPRESS: NORMAL
BH CV LOWER VASCULAR RIGHT POPLITEAL AUGMENT: NORMAL
BH CV LOWER VASCULAR RIGHT POPLITEAL COMPRESS: NORMAL
BH CV LOWER VASCULAR RIGHT POPLITEAL PHASIC: NORMAL
BH CV LOWER VASCULAR RIGHT POPLITEAL SPONT: NORMAL
BH CV LOWER VASCULAR RIGHT POSTERIOR TIBIAL COMPRESS: NORMAL
BH CV LOWER VASCULAR RIGHT PROFUNDA FEMORAL AUGMENT: NORMAL
BH CV LOWER VASCULAR RIGHT PROFUNDA FEMORAL COMPRESS: NORMAL
BH CV LOWER VASCULAR RIGHT PROFUNDA FEMORAL PHASIC: NORMAL
BH CV LOWER VASCULAR RIGHT PROFUNDA FEMORAL SPONT: NORMAL
BH CV LOWER VASCULAR RIGHT PROXIMAL FEMORAL AUGMENT: NORMAL
BH CV LOWER VASCULAR RIGHT PROXIMAL FEMORAL COMPRESS: NORMAL
BH CV LOWER VASCULAR RIGHT PROXIMAL FEMORAL PHASIC: NORMAL
BH CV LOWER VASCULAR RIGHT PROXIMAL FEMORAL SPONT: NORMAL
BH CV LOWER VASCULAR RIGHT SAPHENOFEMORAL JUNCTION AUGMENT: NORMAL
BH CV LOWER VASCULAR RIGHT SAPHENOFEMORAL JUNCTION COMPRESS: NORMAL
BH CV LOWER VASCULAR RIGHT SAPHENOFEMORAL JUNCTION PHASIC: NORMAL
BH CV LOWER VASCULAR RIGHT SAPHENOFEMORAL JUNCTION SPONT: NORMAL
BH CV LOWER VASCULAR RIGHT SOLEAL COMPRESS: NORMAL
BILIRUB SERPL-MCNC: 0.3 MG/DL (ref 0–1.2)
BUN SERPL-MCNC: 12 MG/DL (ref 8–23)
BUN/CREAT SERPL: 12.1 (ref 7–25)
CALCIUM SPEC-SCNC: 10 MG/DL (ref 8.6–10.5)
CHLORIDE SERPL-SCNC: 99 MMOL/L (ref 98–107)
CO2 SERPL-SCNC: 28 MMOL/L (ref 22–29)
CREAT SERPL-MCNC: 0.99 MG/DL (ref 0.76–1.27)
DEPRECATED RDW RBC AUTO: 46.1 FL (ref 37–54)
EGFRCR SERPLBLD CKD-EPI 2021: 85.6 ML/MIN/1.73
EOSINOPHIL # BLD AUTO: 0.22 10*3/MM3 (ref 0–0.4)
EOSINOPHIL NFR BLD AUTO: 3 % (ref 0.3–6.2)
ERYTHROCYTE [DISTWIDTH] IN BLOOD BY AUTOMATED COUNT: 13.6 % (ref 12.3–15.4)
GLOBULIN UR ELPH-MCNC: 2.6 GM/DL
GLUCOSE SERPL-MCNC: 90 MG/DL (ref 65–99)
HCT VFR BLD AUTO: 46.8 % (ref 37.5–51)
HGB BLD-MCNC: 15.8 G/DL (ref 13–17.7)
IMM GRANULOCYTES # BLD AUTO: 0.01 10*3/MM3 (ref 0–0.05)
IMM GRANULOCYTES NFR BLD AUTO: 0.1 % (ref 0–0.5)
LYMPHOCYTES # BLD AUTO: 1.93 10*3/MM3 (ref 0.7–3.1)
LYMPHOCYTES NFR BLD AUTO: 26.4 % (ref 19.6–45.3)
MAXIMAL PREDICTED HEART RATE: 157 BPM
MCH RBC QN AUTO: 30.9 PG (ref 26.6–33)
MCHC RBC AUTO-ENTMCNC: 33.8 G/DL (ref 31.5–35.7)
MCV RBC AUTO: 91.4 FL (ref 79–97)
MONOCYTES # BLD AUTO: 0.98 10*3/MM3 (ref 0.1–0.9)
MONOCYTES NFR BLD AUTO: 13.4 % (ref 5–12)
NEUTROPHILS NFR BLD AUTO: 4.15 10*3/MM3 (ref 1.7–7)
NEUTROPHILS NFR BLD AUTO: 56.7 % (ref 42.7–76)
NRBC BLD AUTO-RTO: 0 /100 WBC (ref 0–0.2)
PLATELET # BLD AUTO: 296 10*3/MM3 (ref 140–450)
PMV BLD AUTO: 11 FL (ref 6–12)
POTASSIUM SERPL-SCNC: 3.8 MMOL/L (ref 3.5–5.2)
PROT SERPL-MCNC: 7.5 G/DL (ref 6–8.5)
RBC # BLD AUTO: 5.12 10*6/MM3 (ref 4.14–5.8)
SODIUM SERPL-SCNC: 138 MMOL/L (ref 136–145)
STRESS TARGET HR: 133 BPM
WBC NRBC COR # BLD: 7.32 10*3/MM3 (ref 3.4–10.8)

## 2022-09-04 PROCEDURE — 99283 EMERGENCY DEPT VISIT LOW MDM: CPT

## 2022-09-04 PROCEDURE — 85025 COMPLETE CBC W/AUTO DIFF WBC: CPT | Performed by: EMERGENCY MEDICINE

## 2022-09-04 PROCEDURE — 80053 COMPREHEN METABOLIC PANEL: CPT | Performed by: EMERGENCY MEDICINE

## 2022-09-04 PROCEDURE — 93971 EXTREMITY STUDY: CPT

## 2022-09-04 RX ORDER — DOXYCYCLINE 100 MG/1
100 CAPSULE ORAL 2 TIMES DAILY
Qty: 20 CAPSULE | Refills: 0 | Status: SHIPPED | OUTPATIENT
Start: 2022-09-04 | End: 2022-09-14

## 2022-09-04 RX ORDER — DOXYCYCLINE 100 MG/1
100 CAPSULE ORAL ONCE
Status: COMPLETED | OUTPATIENT
Start: 2022-09-04 | End: 2022-09-04

## 2022-09-04 RX ORDER — SODIUM CHLORIDE 0.9 % (FLUSH) 0.9 %
10 SYRINGE (ML) INJECTION AS NEEDED
Status: DISCONTINUED | OUTPATIENT
Start: 2022-09-04 | End: 2022-09-04 | Stop reason: HOSPADM

## 2022-09-04 RX ADMIN — DOXYCYCLINE 100 MG: 100 CAPSULE ORAL at 19:02

## 2022-09-04 NOTE — ED PROVIDER NOTES
[place]    EMERGENCY DEPARTMENT ENCOUNTER      Pt Name: Tyler Doss  MRN: [id number]  YOB: 1960  Date of evaluation: 9/4/2022  Provider: Sergey Holt DO    CHIEF COMPLAINT       Chief Complaint   Patient presents with   • Leg Swelling         HISTORY OF PRESENT ILLNESS  (Location/Symptom, Timing/Onset, Context/Setting, Quality, Duration, Modifying Factors, Severity.)   Tyler Doss is a 61 y.o. male who presents to the emergency department for evaluation of a sore red area to his right medial thigh which he started noticing a day or 2 ago.  He notes it is tender to palpation, feels it may be slightly more swollen compared to the left.  Denies any known injury, but he has been doing some work on the deck and the outside of his house and feel he may have strained it at some point.  He does not have any known injury to the area, no obvious bites or lesions.  Denies any active bleeding or drainage from the site.  Denies history of DVT, PE, blood clotting disorders.  Patient denies any distal neuropathy, no other extremity injury or pain or current skin complaints.  Denies any other acute symptom complaints this time.      Nursing notes were reviewed.    REVIEW OF SYSTEMS    (2-9 systems for level 4, 10 or more for level 5)   ROS:  General:  No fevers, no chills, no weakness  Cardiovascular:  No chest pain, no palpitations  Respiratory:  No shortness of breath, no cough, no wheezing  Gastrointestinal:  No pain, no nausea, no vomiting, no diarrhea  Musculoskeletal: Positive right medial thigh pain  Skin:  No rash  Neurologic:  No headache  Psychiatric:  No anxiety  Genitourinary:  No dysuria, no hematuria    Except as noted above the remainder of the review of systems was reviewed and negative.       PAST MEDICAL HISTORY     Past Medical History:   Diagnosis Date   • Allergic    • Anxiety    • Chronic constipation    • Colon polyp 2020   • Constipation    • Depression    •  Diverticulosis 2020    by colon   • Eye exam, routine 2011   • GERD (gastroesophageal reflux disease)    • H/O cardiovascular stress test 10/2015    neg   • H/O colonoscopy 08/2015     dilated and slightly tortuos colon   • H/O echocardiogram 10/2015    neg   • H/O x-ray of lumbar spine 02/2015    hypertrophic facet changes l3-s1, significant    • History of dental examination 06/2012   • Hyperlipidemia    • Prostatitis    • Retinal tear of left eye 06/2018   • Screening PSA (prostate specific antigen) 11/2015    0.3   • Seasonal allergies          SURGICAL HISTORY       Past Surgical History:   Procedure Laterality Date   • CATARACT EXTRACTION Right 11/29/2016   • COLONOSCOPY  09/20/2020    Dr. Ravi   • ENDOSCOPY  09/2014    neg   • EYE SURGERY Left 06/2018    retinal tear         CURRENT MEDICATIONS     No current facility-administered medications for this encounter.    Current Outpatient Medications:   •  aspirin 81 MG chewable tablet, Chew 1 tablet Daily., Disp: 30 tablet, Rfl: 0  •  azelastine (Astelin) 0.1 % nasal spray, 2 sprays each mostril bid, Disp: 3 each, Rfl: 3  •  clonazePAM (KlonoPIN) 0.5 MG tablet, 1 qd prn anxiety, Disp: 90 tablet, Rfl: 0  •  fluticasone (FLONASE) 50 MCG/ACT nasal spray, 2 sprays into the nostril(s) as directed by provider Daily., Disp: , Rfl:   •  gabapentin (NEURONTIN) 300 MG capsule, 1 qhs, Disp: 90 capsule, Rfl: 0  •  levothyroxine (Synthroid) 50 MCG tablet, Take 1 tablet by mouth Daily., Disp: 90 tablet, Rfl: 3  •  omeprazole (priLOSEC) 20 MG capsule, Take 20 mg by mouth Daily., Disp: , Rfl:   •  rosuvastatin (Crestor) 5 MG tablet, Take 1 tablet by mouth Daily., Disp: 90 tablet, Rfl: 1  •  sildenafil (VIAGRA) 50 MG tablet, Take 1 tablet by mouth Daily As Needed for Erectile Dysfunction., Disp: 10 tablet, Rfl: 11  •  temazepam (RESTORIL) 15 MG capsule, Take 1 capsule by mouth Every Night., Disp: 90 capsule, Rfl: 0  •  Trulance 3 MG tablet, Take 1 tablet by mouth Daily., Disp:  "90 tablet, Rfl: 3  •  doxycycline (MONODOX) 100 MG capsule, Take 1 capsule by mouth 2 (Two) Times a Day for 10 days., Disp: 20 capsule, Rfl: 0    ALLERGIES     Motegrity [prucalopride], Atorvastatin, Penicillins, and Pravastatin    FAMILY HISTORY       Family History   Problem Relation Age of Onset   • Ovarian cancer Mother 59        thyroid/goiter   • Thyroid disease Mother    • Heart attack Father 66        crohn's disease   • Crohn's disease Father    • Diabetes Sister    • Hypothyroidism Sister    • Stroke Sister 51        ? Stroke shown on MRI   • Hyperlipidemia Sister    • Cancer Sister    • Atrial fibrillation Sister    • Thyroid disease Brother    • Hyperlipidemia Brother    • Atrial fibrillation Brother    • Colon polyps Brother    • Uterine cancer Paternal Grandmother 40   • Thyroid disease Paternal Grandmother    • Stroke Paternal Grandfather    • Heart attack Paternal Uncle         in 70's   • Heart disease Other    • Depression Other    • Prostate cancer Other    • Ovarian cancer Other    • Immunodeficiency Son         IgA deficiency          SOCIAL HISTORY       Social History     Socioeconomic History   • Marital status:    Tobacco Use   • Smoking status: Never Smoker   • Smokeless tobacco: Never Used   Substance and Sexual Activity   • Alcohol use: Not Currently     Comment: weekly   • Drug use: No   • Sexual activity: Yes     Partners: Female         PHYSICAL EXAM    (up to 7 for level 4, 8 or more for level 5)     Vitals:    09/04/22 1727 09/04/22 1817 09/04/22 1830 09/04/22 1900   BP: 139/87  137/95 120/83   BP Location: Left arm      Patient Position: Sitting      Pulse: 68   66   Resp: 16   16   Temp: 97.4 °F (36.3 °C)      TempSrc: Oral      SpO2: 99%  95% 96%   Weight: 88 kg (194 lb) 88 kg (194 lb)     Height: 190.5 cm (75\") 190 cm (74.8\")         Physical Exam  General : Patient is awake, alert, oriented, in no acute distress, nontoxic appearing  HEENT: Pupils are equally round, EOMI, " conjunctivae clear, there is no injection no icterus.  Oral mucosa is moist, uvula midline  Neck: Neck is supple, full range of motion, trachea midline  Cardiac: Heart regular rate, rhythm, no murmurs, rubs, or gallops  Lungs: Lungs are clear to auscultation, there is no wheezing, rhonchi, or rales. There is no use of accessory muscles  Chest wall: There is no tenderness to palpation over the chest wall or over ribs  Abdomen: Abdomen is soft, nontender, nondistended. There are no firm or pulsatile masses, no rebound rigidity or guarding  Musculoskeletal: Right medial thigh has slight area of induration without any open wound, no palpable loculation or active drainage from the site.  5 out of 5 strength in all 4 extremities.  No focal muscle deficits are appreciated  Neuro: Motor intact, sensory intact, level of consciousness is normal  Dermatology: Indurated region to the right medial thigh as noted above, otherwise skin is warm and dry  Psych: Mentation is grossly normal, cognition is grossly normal. Affect is appropriate      DIAGNOSTIC RESULTS     EKG: All EKGs are interpreted by the Emergency Department Physician who either signs or Co-signs this chart in the absence of a cardiologist.    No orders to display       RADIOLOGY:   Non-plain film images such as CT, Ultrasound and MRI are read by the radiologist. Plain radiographic images are visualized and preliminarily interpreted by the emergency physician with the below findings:      [] Radiologist's Report Reviewed:  No orders to display         ED BEDSIDE ULTRASOUND:   Performed by ED Physician - none    LABS:    I have reviewed and interpreted all of the currently available lab results from this visit (if applicable):  Results for orders placed or performed during the hospital encounter of 09/04/22   Comprehensive Metabolic Panel    Specimen: Blood   Result Value Ref Range    Glucose 90 65 - 99 mg/dL    BUN 12 8 - 23 mg/dL    Creatinine 0.99 0.76 - 1.27 mg/dL     Sodium 138 136 - 145 mmol/L    Potassium 3.8 3.5 - 5.2 mmol/L    Chloride 99 98 - 107 mmol/L    CO2 28.0 22.0 - 29.0 mmol/L    Calcium 10.0 8.6 - 10.5 mg/dL    Total Protein 7.5 6.0 - 8.5 g/dL    Albumin 4.90 3.50 - 5.20 g/dL    ALT (SGPT) 35 1 - 41 U/L    AST (SGOT) 26 1 - 40 U/L    Alkaline Phosphatase 64 39 - 117 U/L    Total Bilirubin 0.3 0.0 - 1.2 mg/dL    Globulin 2.6 gm/dL    A/G Ratio 1.9 g/dL    BUN/Creatinine Ratio 12.1 7.0 - 25.0    Anion Gap 11.0 5.0 - 15.0 mmol/L    eGFR 85.6 >60.0 mL/min/1.73   CBC Auto Differential    Specimen: Blood   Result Value Ref Range    WBC 7.32 3.40 - 10.80 10*3/mm3    RBC 5.12 4.14 - 5.80 10*6/mm3    Hemoglobin 15.8 13.0 - 17.7 g/dL    Hematocrit 46.8 37.5 - 51.0 %    MCV 91.4 79.0 - 97.0 fL    MCH 30.9 26.6 - 33.0 pg    MCHC 33.8 31.5 - 35.7 g/dL    RDW 13.6 12.3 - 15.4 %    RDW-SD 46.1 37.0 - 54.0 fl    MPV 11.0 6.0 - 12.0 fL    Platelets 296 140 - 450 10*3/mm3    Neutrophil % 56.7 42.7 - 76.0 %    Lymphocyte % 26.4 19.6 - 45.3 %    Monocyte % 13.4 (H) 5.0 - 12.0 %    Eosinophil % 3.0 0.3 - 6.2 %    Basophil % 0.4 0.0 - 1.5 %    Immature Grans % 0.1 0.0 - 0.5 %    Neutrophils, Absolute 4.15 1.70 - 7.00 10*3/mm3    Lymphocytes, Absolute 1.93 0.70 - 3.10 10*3/mm3    Monocytes, Absolute 0.98 (H) 0.10 - 0.90 10*3/mm3    Eosinophils, Absolute 0.22 0.00 - 0.40 10*3/mm3    Basophils, Absolute 0.03 0.00 - 0.20 10*3/mm3    Immature Grans, Absolute 0.01 0.00 - 0.05 10*3/mm3    nRBC 0.0 0.0 - 0.2 /100 WBC   Duplex Venous Lower Extremity - Right   Result Value Ref Range    Target HR (85%) 133 bpm    Max. Pred. HR (100%) 157 bpm    Right Common Femoral Spont Y     Right Common Femoral Phasic Y     Right Common Femoral Augment Y     Right Common Femoral Compress C     Right Saphenofemoral Junction Spont Y     Right Saphenofemoral Junction Phasic Y     Right Saphenofemoral Junction Augment Y     Right Saphenofemoral Junction Compress C     Right Profunda Femoral Spont Y     Right  "Profunda Femoral Phasic Y     Right Profunda Femoral Augment Y     Right Profunda Femoral Compress C     Right Proximal Femoral Spont Y     Right Proximal Femoral Phasic Y     Right Proximal Femoral Augment Y     Right Proximal Femoral Compress C     Right Mid Femoral Spont Y     Right Mid Femoral Phasic Y     Right Mid Femoral Augment Y     Right Mid Femoral Compress C     Right Distal Femoral Spont Y     Right Distal Femoral Phasic Y     Right Distal Femoral Augment Y     Right Distal Femoral Compress C     Right Popliteal Spont Y     Right Popliteal Phasic Y     Right Popliteal Augment Y     Right Popliteal Compress C     Right Posterior Tibial Compress C     Right Peroneal Compress C     Right Gastronemius Compress C     Right Greater Saph AK Compress C     Right Greater Saph BK Compress C     Right Lesser Saph Compress C     Left Common Femoral Spont Y     Left Common Femoral Phasic Y     Left Common Femoral Augment Y     Left Common Femoral Compress C     BH CV LOWER VASCULAR RIGHT SOLEAL COMPRESS C         All other labs were within normal range or not returned as of this dictation.      EMERGENCY DEPARTMENT COURSE and DIFFERENTIAL DIAGNOSIS/MDM:   Vitals:    Vitals:    09/04/22 1727 09/04/22 1817 09/04/22 1830 09/04/22 1900   BP: 139/87  137/95 120/83   BP Location: Left arm      Patient Position: Sitting      Pulse: 68   66   Resp: 16   16   Temp: 97.4 °F (36.3 °C)      TempSrc: Oral      SpO2: 99%  95% 96%   Weight: 88 kg (194 lb) 88 kg (194 lb)     Height: 190.5 cm (75\") 190 cm (74.8\")              Patient with a right medial thigh induration, cellulitis with some mild swelling to the region.  No focal abnormalities distal to this region.  No calf pain or swelling.  We do obtain venous duplex of the right lower extremity which does not reveal any acute abnormality, no DVT or superficial thrombosis, we will plan on moving forward with cellulitis treatment.  Following up with his PCP a couple days for wound " check, reevaluation.  Started on antibiotics.  Return precautions discussed.    I had a discussion with the patient/family regarding diagnosis, diagnostic results, treatment plan, and medications.  The patient/family indicated understanding of these instructions.   I encouraged the patient to return to the emergency department immediately for ANY concerns, worsening, new complaints, or if symptoms persist and unable to seek follow-up in a timely fashion.  The patient/family expressed understanding and agreement with this plan.  The patient will follow-up with their PCP in 1-2 days for reevaluation.       MEDICATIONS ADMINISTERED IN ED:  Medications   doxycycline (MONODOX) capsule 100 mg (100 mg Oral Given 9/4/22 1902)       PROCEDURES:  Procedures    CRITICAL CARE TIME    Total Critical Care time was 0 minutes, excluding separately reportable procedures.   There was a high probability of clinically significant/life threatening deterioration in the patient's condition which required my urgent intervention.      FINAL IMPRESSION      1. Cellulitis of right thigh          DISPOSITION/PLAN     ED Disposition     ED Disposition   Discharge    Condition   Stable    Comment   --             PATIENT REFERRED TO:  Ya Shafer MD  [address] [place] [address]  [phone]    In 2 days  For wound re-check    [facility]  [address] [address] [phone]    If symptoms worsen      DISCHARGE MEDICATIONS:     Medication List      START taking these medications    doxycycline 100 MG capsule  Commonly known as: MONODOX  Take 1 capsule by mouth 2 (Two) Times a Day for 10 days.        CONTINUE taking these medications    aspirin 81 MG chewable tablet  Chew 1 tablet Daily.     azelastine 0.1 % nasal spray  Commonly known as: Astelin  2 sprays each mostril bid     clonazePAM 0.5 MG tablet  Commonly known as: KlonoPIN  1 qd prn anxiety      fluticasone 50 MCG/ACT nasal spray  Commonly known as: FLONASE     gabapentin 300 MG capsule  Commonly known as: NEURONTIN  1 qhs     levothyroxine 50 MCG tablet  Commonly known as: Synthroid  Take 1 tablet by mouth Daily.     omeprazole 20 MG capsule  Commonly known as: priLOSEC     rosuvastatin 5 MG tablet  Commonly known as: Crestor  Take 1 tablet by mouth Daily.     sildenafil 50 MG tablet  Commonly known as: VIAGRA  Take 1 tablet by mouth Daily As Needed for Erectile Dysfunction.     temazepam 15 MG capsule  Commonly known as: RESTORIL  Take 1 capsule by mouth Every Night.     Trulance 3 MG tablet  Generic drug: Plecanatide  Take 1 tablet by mouth Daily.           Where to Get Your Medications      These medications were sent to [address] [facility]  [phone] [address] [place] [phone]   [address] [place] [address]    Phone: [phone]   · doxycycline 100 MG capsule             Comment: Please note this report has been produced using speech recognition software.      Sergey Holt DO  Attending Emergency Physician               Sergey Holt DO  09/05/22 0107

## 2022-09-09 ENCOUNTER — LAB (OUTPATIENT)
Dept: LAB | Facility: HOSPITAL | Age: 62
End: 2022-09-09

## 2022-09-09 ENCOUNTER — OFFICE VISIT (OUTPATIENT)
Dept: INTERNAL MEDICINE | Facility: CLINIC | Age: 62
End: 2022-09-09

## 2022-09-09 VITALS
BODY MASS INDEX: 25.49 KG/M2 | WEIGHT: 205 LBS | HEIGHT: 75 IN | DIASTOLIC BLOOD PRESSURE: 78 MMHG | OXYGEN SATURATION: 99 % | HEART RATE: 70 BPM | TEMPERATURE: 97.1 F | SYSTOLIC BLOOD PRESSURE: 112 MMHG

## 2022-09-09 DIAGNOSIS — E03.9 HYPOTHYROIDISM (ACQUIRED): Primary | Chronic | ICD-10-CM

## 2022-09-09 DIAGNOSIS — Z83.2 FAMILY HISTORY OF IMMUNODEFICIENCY DISORDER: ICD-10-CM

## 2022-09-09 DIAGNOSIS — E78.00 PURE HYPERCHOLESTEROLEMIA: Chronic | ICD-10-CM

## 2022-09-09 DIAGNOSIS — R25.2 LEG CRAMPS: Chronic | ICD-10-CM

## 2022-09-09 DIAGNOSIS — D80.2 IGA DEFICIENCY: ICD-10-CM

## 2022-09-09 DIAGNOSIS — Z79.899 HIGH RISK MEDICATIONS (NOT ANTICOAGULANTS) LONG-TERM USE: ICD-10-CM

## 2022-09-09 DIAGNOSIS — F51.01 PRIMARY INSOMNIA: Chronic | ICD-10-CM

## 2022-09-09 PROCEDURE — 99214 OFFICE O/P EST MOD 30 MIN: CPT | Performed by: INTERNAL MEDICINE

## 2022-09-09 PROCEDURE — 82784 ASSAY IGA/IGD/IGG/IGM EACH: CPT | Performed by: INTERNAL MEDICINE

## 2022-09-09 NOTE — PROGRESS NOTES
Chief Complaint  Insomnia (Follow up for med refill), Hyperlipidemia (F/u), and Leg Pain (Was seen in ER recently dx with cellulitis and is taking an antibiotic.)    Subjective          Tyler Doss presents to [facility]  History of Present Illness    Insomnia-patient is on temazepam 15 mg at night.  This was filled last month for 90 days to the mail order. Sleep has not been quite as good with the leg strain but overall feels like he is doing okay on this dose    Leg cramps-patient on gabapentin 300 mg at night.  This also was filled last month for 90 days.  Will use klonopin just occasionally if the muscles are cramping a lot    Hypothyroid- he is on Synthroid 50.  Last TSH was 4 months ago and was 2.0. energy has improved some. He does have chronic constipation - uses trulance daily and the miralax daily and seems to be doing good overall with bowel movements daily    Hyperlipidemia-he is on Crestor 5 and last FLP was 2/22 and LDL was 65. A little sore w/ it but not bad    Cellulitis- right thigh-he was seen in the emergency room 5 days ago and duplex was negative for clot and he was started on doxycycline and has improved.  No longer red and just mildly sore.  He feels like he strained his muscle picking up his dog.     FH of IgA deficiency -his son. His IgA was 70 in the past (low normal)     Current Outpatient Medications:   •  aspirin 81 MG chewable tablet, Chew 1 tablet Daily., Disp: 30 tablet, Rfl: 0  •  azelastine (Astelin) 0.1 % nasal spray, 2 sprays each mostril bid, Disp: 3 each, Rfl: 3  •  clonazePAM (KlonoPIN) 0.5 MG tablet, 1 qd prn anxiety, Disp: 90 tablet, Rfl: 0  •  doxycycline (MONODOX) 100 MG capsule, Take 1 capsule by mouth 2 (Two) Times a Day for 10 days., Disp: 20 capsule, Rfl: 0  •  fluticasone (FLONASE) 50 MCG/ACT nasal spray, 2 sprays into the nostril(s) as directed by provider Daily., Disp: , Rfl:   •  gabapentin (NEURONTIN) 300 MG capsule, 1 qhs,  "Disp: 90 capsule, Rfl: 0  •  levothyroxine (Synthroid) 50 MCG tablet, Take 1 tablet by mouth Daily., Disp: 90 tablet, Rfl: 3  •  omeprazole (priLOSEC) 20 MG capsule, Take 20 mg by mouth Daily., Disp: , Rfl:   •  rosuvastatin (Crestor) 5 MG tablet, Take 1 tablet by mouth Daily., Disp: 90 tablet, Rfl: 1  •  sildenafil (VIAGRA) 50 MG tablet, Take 1 tablet by mouth Daily As Needed for Erectile Dysfunction., Disp: 10 tablet, Rfl: 11  •  temazepam (RESTORIL) 15 MG capsule, Take 1 capsule by mouth Every Night., Disp: 90 capsule, Rfl: 0  •  Trulance 3 MG tablet, Take 1 tablet by mouth Daily., Disp: 90 tablet, Rfl: 3      Objective   Vital Signs:   /78 (BP Location: Left arm, Patient Position: Sitting)   Pulse 70   Temp 97.1 °F (36.2 °C) (Infrared)   Ht 190.5 cm (75\")   Wt 93 kg (205 lb)   SpO2 99%   BMI 25.62 kg/m²       Physical exam  Constitutional: oriented to person, place, and time.  well-developed and well-nourished. No distress.   HENT:   Head: Normocephalic and atraumatic.   Eyes: Conjunctivae and EOM are normal.   Cardiovascular: Normal rate, regular rhythm and normal heart sounds.  Exam reveals no gallop and no friction rub.    No murmur heard.  Pulmonary/Chest: Effort normal and breath sounds normal. No respiratory distress.   no wheezes.   Neurological:  alert and oriented to person, place, and time.   Skin: Skin is warm and dry. not diaphoretic.   Psychiatric:  normal mood and affect. behavior is normal. Judgment and thought content normal.      Result Review :   The following data was reviewed by: Ya Shafer MD on 09/09/2022:  CMP    CMP 10/17/21 10/17/21 2/21/22 9/4/22    1349 1415     Glucose  111 (A) 90 90   BUN  15 13 12   Creatinine 1.00 0.95 1.02 0.99   eGFR Non African Am  80 74    Sodium  133 (A) 135 (A) 138   Potassium  4.2 4.3 3.8   Chloride  97 (A) 99 99   Calcium  9.8 9.7 10.0   Albumin  4.90 4.90 4.90   Total Bilirubin  0.3 0.3 0.3   Alkaline Phosphatase  58 54 64   AST (SGOT)  27 " 19 26   ALT (SGPT)  29 28 35   (A) Abnormal value       Comments are available for some flowsheets but are not being displayed.           CBC    CBC 10/17/21 10/17/21 9/4/22    1349 1415    WBC  8.06 7.32   RBC  5.00 5.12   Hemoglobin 16.0 15.1 15.8   Hematocrit 47 46.0 46.8   MCV  92.0 91.4   MCH  30.2 30.9   MCHC  32.8 33.8   RDW  13.7 13.6   Platelets  277 296           Lipid Panel    Lipid Panel 10/18/21 2/21/22   Total Cholesterol 173 126   Triglycerides 105 113   HDL Cholesterol 41 40   VLDL Cholesterol 19 21   LDL Cholesterol  113 (A) 65   LDL/HDL Ratio 2.71 1.59   (A) Abnormal value            TSH    TSH 10/17/21 2/21/22 5/9/22   TSH 2.090 3.380 2.060           A1C Last 3 Results    HGBA1C Last 3 Results 10/18/21   Hemoglobin A1C 5.30           Lab Results   Component Value Date    LNRR36JR 56.8 02/05/2021    PAXOKMYA89 1,258 (H) 12/26/2019     Data reviewed: Recent hospitalization notes ER records from last weekend          Assessment and Plan    Diagnoses and all orders for this visit:    1. Hypothyroidism (acquired) (Primary)-we will recheck in 3 to 6 months    2. Primary insomnia-stable on temazepam.  He has signed controlled substance contract.  Hema is appropriate.  UDS due today  -     Compliance Drug Analysis, Ur - Urine, Clean Catch; Future  -     Compliance Drug Analysis, Ur - Urine, Clean Catch    3. Leg cramps-stable on gabapentin.  See above regarding CSC  -     Compliance Drug Analysis, Ur - Urine, Clean Catch; Future  -     Compliance Drug Analysis, Ur - Urine, Clean Catch    4. Pure hypercholesterolemia- on Crestor, recheck FLP next year    5. High risk medications (not anticoagulants) long-term use  -     Compliance Drug Analysis, Ur - Urine, Clean Catch; Future  -     Compliance Drug Analysis, Ur - Urine, Clean Catch    6. Family history of immunodeficiency disorder- his wife is wondering if the IgA is below normal if he would be able to get the Evusheld. We will recheck  -     IgA;  Future        Follow Up   Return in about 3 months (around 12/9/2022) for Next scheduled follow up.  Patient was given instructions and counseling regarding his condition or for health maintenance advice. Please see specific information pulled into the AVS if appropriate.

## 2022-09-10 LAB — IGA1 MFR SER: 64 MG/DL (ref 70–400)

## 2022-09-16 LAB — DRUGS UR: NORMAL

## 2022-09-19 DIAGNOSIS — E78.00 PURE HYPERCHOLESTEROLEMIA: ICD-10-CM

## 2022-09-19 RX ORDER — ROSUVASTATIN CALCIUM 5 MG/1
5 TABLET, COATED ORAL DAILY
Qty: 90 TABLET | Refills: 3 | OUTPATIENT
Start: 2022-09-19

## 2022-09-19 NOTE — TELEPHONE ENCOUNTER
Rx Refill Note  Requested Prescriptions     Pending Prescriptions Disp Refills   • rosuvastatin (CRESTOR) 5 MG tablet [Pharmacy Med Name: Rosuvastatin Calcium 5 MG Oral Tablet] 90 tablet 3     Sig: TAKE 1 TABLET BY MOUTH  DAILY      Last filled: 05/09/2022  Last office visit with prescribing clinician: 9/9/2022      Next office visit with prescribing clinician: 12/9/2022 April ILIANA Alejandro MA  09/19/22, 07:47 EDT

## 2022-10-03 ENCOUNTER — PRIOR AUTHORIZATION (OUTPATIENT)
Dept: INTERNAL MEDICINE | Facility: CLINIC | Age: 62
End: 2022-10-03

## 2022-10-04 RX ORDER — PLECANATIDE 3 MG/1
3 TABLET ORAL DAILY
Qty: 90 TABLET | Refills: 3 | OUTPATIENT
Start: 2022-10-04

## 2022-10-27 DIAGNOSIS — G25.81 RLS (RESTLESS LEGS SYNDROME): Chronic | ICD-10-CM

## 2022-10-28 RX ORDER — GABAPENTIN 300 MG/1
CAPSULE ORAL
Qty: 90 CAPSULE | Refills: 1 | Status: SHIPPED | OUTPATIENT
Start: 2022-10-28 | End: 2022-11-04 | Stop reason: SDUPTHER

## 2022-10-31 RX ORDER — SILDENAFIL 50 MG/1
TABLET, FILM COATED ORAL
Qty: 10 TABLET | Refills: 11 | Status: SHIPPED | OUTPATIENT
Start: 2022-10-31 | End: 2023-01-04 | Stop reason: SDUPTHER

## 2022-11-04 DIAGNOSIS — G25.81 RLS (RESTLESS LEGS SYNDROME): Chronic | ICD-10-CM

## 2022-11-04 RX ORDER — GABAPENTIN 300 MG/1
CAPSULE ORAL
Qty: 90 CAPSULE | Refills: 1 | Status: SHIPPED | OUTPATIENT
Start: 2022-11-04 | End: 2023-01-04 | Stop reason: SDUPTHER

## 2022-11-15 ENCOUNTER — OFFICE VISIT (OUTPATIENT)
Dept: GASTROENTEROLOGY | Facility: CLINIC | Age: 62
End: 2022-11-15

## 2022-11-15 VITALS
BODY MASS INDEX: 25.81 KG/M2 | DIASTOLIC BLOOD PRESSURE: 57 MMHG | WEIGHT: 207.6 LBS | TEMPERATURE: 97.8 F | HEART RATE: 83 BPM | SYSTOLIC BLOOD PRESSURE: 118 MMHG | HEIGHT: 75 IN

## 2022-11-15 DIAGNOSIS — K59.04 CHRONIC IDIOPATHIC CONSTIPATION: Primary | ICD-10-CM

## 2022-11-15 PROCEDURE — 99214 OFFICE O/P EST MOD 30 MIN: CPT | Performed by: NURSE PRACTITIONER

## 2022-11-15 RX ORDER — COLCHICINE 0.6 MG/1
0.6 TABLET ORAL 3 TIMES DAILY
Qty: 90 TABLET | Refills: 2 | Status: SHIPPED | OUTPATIENT
Start: 2022-11-15

## 2022-11-15 NOTE — PROGRESS NOTES
GASTROENTEROLOGY OFFICE NOTE  Tyler Doss  [id number]  1960    CARE TEAM  Patient Care Team:  Ya Shafer MD as PCP - General  Ya Shafer MD as PCP - Family Medicine  Barber Garg MD as Consulting Physician (Ophthalmology)  Chandler Ravi MD as Consulting Physician (Gastroenterology)  Sherron Mon APRN as Nurse Practitioner (Cardiology)  José Luis Alston MD as Consulting Physician (Neurology)    Referring Provider: Ya Shafer MD    Chief Complaint   Patient presents with   • Constipation        HISTORY OF PRESENT ILLNESS:  Mr. Doss is a 62 year old male with chronic constipation and has been struggling much more over the past few weeks. In the past he has had therapeutic failure to Linzess and Amitiza. He had some side effects taking Motegrity, although it seemed to help with constipation he was unable to continue. For the past year or more he has been taking Trulance 3 mg daily, initially with a fairly good response but for the past several months he has had to resort to taking Mag Citrate at least once a week. Since Magnesium Citrate has not been available he as added Miralax 17 grams twice daily to his regimen and a magnesium supplement but still goes multiple days without a bowel movement. Once he has been days without a bowel movement he additionally takes milk of magnesia and dulcolax suppository and will eventually have a very large volume stool. He is concerned there could be something obstructing in his colon.     PAST MEDICAL HISTORY  Past Medical History:   Diagnosis Date   • Allergic    • Anxiety    • Chronic constipation     neg celiac panel, including ttg IgG   • Colon polyp 2020   • Constipation    • Depression    • Diverticulosis 2020    by colon   • Eye exam, routine 2011   • GERD (gastroesophageal reflux disease)    • H/O cardiovascular stress test 10/2015    neg   • H/O colonoscopy 08/2015     dilated and slightly tortuos colon   • H/O  echocardiogram 10/2015    neg   • H/O x-ray of lumbar spine 02/2015    hypertrophic facet changes l3-s1, significant    • History of dental examination 06/2012   • Hyperlipidemia    • IgA deficiency (HCC)     mild   • Prostatitis    • Retinal tear of left eye 06/2018   • Screening PSA (prostate specific antigen) 11/2015    0.3   • Seasonal allergies         PAST SURGICAL HISTORY  Past Surgical History:   Procedure Laterality Date   • CATARACT EXTRACTION Right 11/29/2016   • COLONOSCOPY  09/20/2020    Dr. Ravi   • ENDOSCOPY  09/2014    neg   • EYE SURGERY Left 06/2018    retinal tear        MEDICATIONS:    Current Outpatient Medications:   •  aspirin 81 MG chewable tablet, Chew 1 tablet Daily., Disp: 30 tablet, Rfl: 0  •  azelastine (Astelin) 0.1 % nasal spray, 2 sprays each mostril bid, Disp: 3 each, Rfl: 3  •  clonazePAM (KlonoPIN) 0.5 MG tablet, 1 qd prn anxiety, Disp: 90 tablet, Rfl: 0  •  fluticasone (FLONASE) 50 MCG/ACT nasal spray, 2 sprays into the nostril(s) as directed by provider Daily., Disp: , Rfl:   •  gabapentin (NEURONTIN) 300 MG capsule, TAKE 1 CAPSULE BY MOUTH  EVERY NIGHT AT BEDTIME, Disp: 90 capsule, Rfl: 1  •  levothyroxine (Synthroid) 50 MCG tablet, Take 1 tablet by mouth Daily., Disp: 90 tablet, Rfl: 3  •  omeprazole (priLOSEC) 20 MG capsule, Take 20 mg by mouth Daily., Disp: , Rfl:   •  rosuvastatin (Crestor) 5 MG tablet, Take 1 tablet by mouth Daily., Disp: 90 tablet, Rfl: 1  •  sildenafil (VIAGRA) 50 MG tablet, TAKE ONE TABLET BY MOUTH DAILY AS NEEDED FOR ERECTILE DYSFUNCTION, Disp: 10 tablet, Rfl: 11  •  temazepam (RESTORIL) 15 MG capsule, Take 1 capsule by mouth Every Night., Disp: 90 capsule, Rfl: 0  •  Trulance 3 MG tablet, Take 1 tablet by mouth Daily., Disp: 90 tablet, Rfl: 3  •  colchicine 0.6 MG tablet, Take 1 tablet by mouth 3 (Three) Times a Day., Disp: 90 tablet, Rfl: 2    ALLERGIES  Allergies   Allergen Reactions   • Motegrity [Prucalopride] Dizziness     Headache, increased  "pulse rate, stomach cramps, dizziness and fatigue.   • Atorvastatin Myalgia   • Penicillins Rash   • Pravastatin Myalgia       FAMILY HISTORY:  Family History   Problem Relation Age of Onset   • Ovarian cancer Mother 59        thyroid/goiter   • Thyroid disease Mother    • Heart attack Father 66        crohn's disease   • Crohn's disease Father    • Diabetes Sister    • Hypothyroidism Sister    • Stroke Sister 51        ? Stroke shown on MRI   • Hyperlipidemia Sister    • Cancer Sister    • Atrial fibrillation Sister    • Thyroid disease Brother    • Hyperlipidemia Brother    • Atrial fibrillation Brother    • Colon polyps Brother    • Uterine cancer Paternal Grandmother 40   • Thyroid disease Paternal Grandmother    • Stroke Paternal Grandfather    • Heart attack Paternal Uncle         in 70's   • Heart disease Other    • Depression Other    • Prostate cancer Other    • Ovarian cancer Other    • Immunodeficiency Son         IgA deficiency       SOCIAL HISTORY  Social History     Socioeconomic History   • Marital status:    Tobacco Use   • Smoking status: Never   • Smokeless tobacco: Never   Vaping Use   • Vaping Use: Never used   Substance and Sexual Activity   • Alcohol use: Not Currently     Comment: weekly   • Drug use: No   • Sexual activity: Yes     Partners: Female         PHYSICAL EXAM   /57 (BP Location: Left arm, Patient Position: Sitting, Cuff Size: Adult)   Pulse 83   Temp 97.8 °F (36.6 °C) (Temporal)   Ht 190.5 cm (75\")   Wt 94.2 kg (207 lb 9.6 oz)   BMI 25.95 kg/m²   Physical Exam  Constitutional:       Appearance: Normal appearance.   HENT:      Head: Normocephalic and atraumatic.   Cardiovascular:      Rate and Rhythm: Normal rate.   Pulmonary:      Effort: Pulmonary effort is normal.   Abdominal:      General: There is no distension.      Palpations: Abdomen is soft.   Neurological:      Mental Status: He is alert and oriented to person, place, and time.   Psychiatric:         Mood " and Affect: Mood normal.         Thought Content: Thought content normal.           ASSESSMENT / PLAN  1. Constipation  -Trulance 3 mg daily  -Linzess 290 mcg daily  -colchicine 0.6 mg TID  -colonoscopy    Return for Follow up after procedures.    I discussed the patients findings and my recommendations with patient    GIOVANNA Anguiano

## 2022-11-21 DIAGNOSIS — F51.01 PRIMARY INSOMNIA: Chronic | ICD-10-CM

## 2022-11-22 RX ORDER — TEMAZEPAM 15 MG/1
15 CAPSULE ORAL NIGHTLY
Qty: 90 CAPSULE | Refills: 0 | Status: SHIPPED | OUTPATIENT
Start: 2022-11-22 | End: 2023-01-04 | Stop reason: SDUPTHER

## 2022-11-25 ENCOUNTER — TELEPHONE (OUTPATIENT)
Dept: INTERNAL MEDICINE | Facility: CLINIC | Age: 62
End: 2022-11-25

## 2022-11-25 RX ORDER — OSELTAMIVIR PHOSPHATE 75 MG/1
75 CAPSULE ORAL DAILY
Qty: 10 CAPSULE | Refills: 0 | Status: SHIPPED | OUTPATIENT
Start: 2022-11-25 | End: 2022-12-29

## 2022-11-25 NOTE — TELEPHONE ENCOUNTER
Patient's wife tested positive for Flu A today. Will send in tamiflu preventative for Tyler    11/28/22- wife called that Tyler did develop flu symptoms day she tested positive, but felt better for a day then worse again, with low grade fever, chills, and cough today. Will send in doxy

## 2022-11-29 RX ORDER — DOXYCYCLINE 100 MG/1
100 CAPSULE ORAL 2 TIMES DAILY
Qty: 14 CAPSULE | Refills: 0 | Status: SHIPPED | OUTPATIENT
Start: 2022-11-29 | End: 2022-12-29

## 2022-12-07 NOTE — TELEPHONE ENCOUNTER
Rx Refill Note  Requested Prescriptions     Pending Prescriptions Disp Refills   • linaclotide (Linzess) 290 MCG capsule capsule 30 capsule 11     Sig: Take 1 capsule by mouth Every Morning Before Breakfast.      Last office visit with prescribing clinician: 11/15/2022   Last telemedicine visit with prescribing clinician: 1/13/2023   Next office visit with prescribing clinician: 1/19/2023                         Would you like a call back once the refill request has been completed: [] Yes [] No    If the office needs to give you a call back, can they leave a voicemail: [] Yes [] No    Ekta Rollins LPN  12/07/22, 12:21 EST

## 2022-12-08 ENCOUNTER — PRIOR AUTHORIZATION (OUTPATIENT)
Dept: GASTROENTEROLOGY | Facility: CLINIC | Age: 62
End: 2022-12-08

## 2022-12-22 ENCOUNTER — PRIOR AUTHORIZATION (OUTPATIENT)
Dept: GASTROENTEROLOGY | Facility: CLINIC | Age: 62
End: 2022-12-22

## 2022-12-29 ENCOUNTER — OFFICE VISIT (OUTPATIENT)
Dept: INTERNAL MEDICINE | Facility: CLINIC | Age: 62
End: 2022-12-29

## 2022-12-29 VITALS
HEIGHT: 75 IN | SYSTOLIC BLOOD PRESSURE: 114 MMHG | BODY MASS INDEX: 25.12 KG/M2 | DIASTOLIC BLOOD PRESSURE: 72 MMHG | WEIGHT: 202 LBS | HEART RATE: 70 BPM | OXYGEN SATURATION: 98 % | TEMPERATURE: 96.9 F

## 2022-12-29 DIAGNOSIS — R25.2 LEG CRAMPS: Chronic | ICD-10-CM

## 2022-12-29 DIAGNOSIS — E78.00 PURE HYPERCHOLESTEROLEMIA: Chronic | ICD-10-CM

## 2022-12-29 DIAGNOSIS — F51.01 PRIMARY INSOMNIA: Primary | Chronic | ICD-10-CM

## 2022-12-29 DIAGNOSIS — F41.9 ANXIETY: ICD-10-CM

## 2022-12-29 DIAGNOSIS — E03.9 HYPOTHYROIDISM (ACQUIRED): Chronic | ICD-10-CM

## 2022-12-29 PROCEDURE — 99214 OFFICE O/P EST MOD 30 MIN: CPT | Performed by: INTERNAL MEDICINE

## 2022-12-29 RX ORDER — CLONAZEPAM 0.5 MG/1
TABLET ORAL
Qty: 90 TABLET | Refills: 0 | Status: SHIPPED | OUTPATIENT
Start: 2022-12-29 | End: 2023-01-04 | Stop reason: SDUPTHER

## 2022-12-29 RX ORDER — SODIUM, POTASSIUM,MAG SULFATES 17.5-3.13G
SOLUTION, RECONSTITUTED, ORAL ORAL
Qty: 354 ML | Refills: 0 | Status: SHIPPED | OUTPATIENT
Start: 2022-12-29 | End: 2023-03-07 | Stop reason: ALTCHOICE

## 2022-12-29 RX ORDER — ROSUVASTATIN CALCIUM 5 MG/1
5 TABLET, COATED ORAL DAILY
Qty: 90 TABLET | Refills: 1 | Status: SHIPPED | OUTPATIENT
Start: 2022-12-29 | End: 2023-01-04 | Stop reason: SDUPTHER

## 2022-12-29 NOTE — PROGRESS NOTES
Chief Complaint  Insomnia, Hyperlipidemia, Hypothyroidism, leg cramps (F/u on medication), and Med Refill    Subjective          Tyler Doss presents to [facility]  History of Present Illness    Insomnia-patient is on temazepam 15 mg at night.  This was filled last month for 90 days     Leg cramps-patient on gabapentin 300 mg at night.  This also was filled last month for 90 days.  Will use klonopin just occasionally if the muscles are cramping a lot.  Does need a refill on the Klonopin     Hypothyroid- he is on Synthroid 50.  Last TSH was 7 months ago and was 2.0. energy has improved some. He does have chronic constipation - was using  trulance daily and the miralax daily ,but now on Linzess samples 290 through GI and 2 caps of Miralax daily and doing okay.  Does have to be careful with his diet and eat soft foods generally     Hyperlipidemia-he is on Crestor 5 and last FLP was 2/22 and LDL was 65. A little sore w/ crestor but tolerable      FH of IgA deficiency -his son. His IgA was 64 when rechecked a few months ago and we were going to check the other Ig levels but lab was closed when he was here last      Current Outpatient Medications:   •  aspirin 81 MG chewable tablet, Chew 1 tablet Daily., Disp: 30 tablet, Rfl: 0  •  azelastine (Astelin) 0.1 % nasal spray, 2 sprays each mostril bid, Disp: 3 each, Rfl: 3  •  clonazePAM (KlonoPIN) 0.5 MG tablet, 1 qd prn anxiety, Disp: 90 tablet, Rfl: 0  •  colchicine 0.6 MG tablet, Take 1 tablet by mouth 3 (Three) Times a Day., Disp: 90 tablet, Rfl: 2  •  fluticasone (FLONASE) 50 MCG/ACT nasal spray, 2 sprays into the nostril(s) as directed by provider Daily., Disp: , Rfl:   •  gabapentin (NEURONTIN) 300 MG capsule, TAKE 1 CAPSULE BY MOUTH  EVERY NIGHT AT BEDTIME, Disp: 90 capsule, Rfl: 1  •  linaclotide (Linzess) 290 MCG capsule capsule, Take 1 capsule by mouth Every Morning Before Breakfast., Disp: 30 capsule, Rfl: 11  •  linaclotide  "(LINZESS) 290 MCG capsule capsule, Take 290 mcg by mouth Every Morning Before Breakfast., Disp: , Rfl:   •  omeprazole (priLOSEC) 20 MG capsule, Take 20 mg by mouth Daily., Disp: , Rfl:   •  rosuvastatin (Crestor) 5 MG tablet, Take 1 tablet by mouth Daily., Disp: 90 tablet, Rfl: 1  •  sildenafil (VIAGRA) 50 MG tablet, TAKE ONE TABLET BY MOUTH DAILY AS NEEDED FOR ERECTILE DYSFUNCTION, Disp: 10 tablet, Rfl: 11  •  temazepam (RESTORIL) 15 MG capsule, Take 1 capsule by mouth Every Night., Disp: 90 capsule, Rfl: 0  •  doxycycline (MONODOX) 100 MG capsule, Take 1 capsule by mouth 2 (Two) Times a Day., Disp: 14 capsule, Rfl: 0  •  levothyroxine (Synthroid) 50 MCG tablet, Take 1 tablet by mouth Daily., Disp: 90 tablet, Rfl: 3  •  sodium-potassium-magnesium sulfates (Suprep Bowel Prep Kit) 17.5-3.13-1.6 GM/177ML solution oral solution, Use as directed by provider for colonoscopy prep, Disp: 354 mL, Rfl: 0   The following portions of the patient's history were reviewed and updated as appropriate: allergies, current medications, past family history, past medical history, past social history, past surgical history and problem list.     Objective   Vital Signs:   /72 (BP Location: Right arm, Patient Position: Sitting)   Pulse 70   Temp 96.9 °F (36.1 °C) (Infrared)   Ht 190.5 cm (75\")   Wt 91.6 kg (202 lb)   SpO2 98%   BMI 25.25 kg/m²       Physical exam  Constitutional: oriented to person, place, and time.  well-developed and well-nourished. No distress.   HENT:   Head: Normocephalic and atraumatic.   Eyes: Conjunctivae and EOM are normal.   Cardiovascular: Normal rate, regular rhythm and normal heart sounds.  Exam reveals no gallop and no friction rub.    No murmur heard.  Pulmonary/Chest: Effort normal and breath sounds normal. No respiratory distress.   no wheezes.   Neurological:  alert and oriented to person, place, and time.   Skin: Skin is warm and dry. not diaphoretic.   Psychiatric:  normal mood and affect. " behavior is normal. Judgment and thought content normal.      Result Review :   The following data was reviewed by: Ya Shafer MD on 12/29/2022:  Common labs    Common Labs 2/21/22 2/21/22 2/21/22 9/4/22 9/4/22    0844 0844 0844 1754 1754   Glucose  90   90   BUN  13   12   Creatinine  1.02   0.99   eGFR Non African Am  74      Sodium  135 (A)   138   Potassium  4.3   3.8   Chloride  99   99   Calcium  9.7   10.0   Albumin  4.90   4.90   Total Bilirubin  0.3   0.3   Alkaline Phosphatase  54   64   AST (SGOT)  19   26   ALT (SGPT)  28   35   WBC    7.32    Hemoglobin    15.8    Hematocrit    46.8    Platelets    296    Total Cholesterol 126       Triglycerides 113       HDL Cholesterol 40       LDL Cholesterol  65       PSA   0.316     (A) Abnormal value       Comments are available for some flowsheets but are not being displayed.           TSH    TSH 2/21/22 5/9/22   TSH 3.380 2.060           Lab Results   Component Value Date    YOPB54KM 56.8 02/05/2021    PXNBBJXA45 1,258 (H) 12/26/2019               Assessment and Plan    Diagnoses and all orders for this visit:    1. Primary insomnia (Primary)-stable on temazepam.  He has signed controlled substance contract.  Hema is appropriate.  UDS is due 9/23    2. Leg cramps-stable on gabapentin.  See above regarding CSC    3. Hypothyroidism (acquired)-we will plan to recheck at his physical    4. Pure hypercholesterolemia-we will recheck at his physical  -     rosuvastatin (Crestor) 5 MG tablet; Take 1 tablet by mouth Daily.  Dispense: 90 tablet; Refill: 1    5. Anxiety  Comments:  Uses Klonopin just occasionally.  Refilled today.  See above regarding controlled substance contract  Orders:  -     clonazePAM (KlonoPIN) 0.5 MG tablet; 1 qd prn anxiety  Dispense: 90 tablet; Refill: 0    6.  Low IgA level-we will plan to check immunoglobulin panel when he comes in for his physical    Follow Up   No follow-ups on file.  Patient was given instructions and counseling  regarding his condition or for health maintenance advice. Please see specific information pulled into the AVS if appropriate.

## 2023-01-04 DIAGNOSIS — F41.9 ANXIETY: ICD-10-CM

## 2023-01-04 DIAGNOSIS — E78.00 PURE HYPERCHOLESTEROLEMIA: Chronic | ICD-10-CM

## 2023-01-04 DIAGNOSIS — G25.81 RLS (RESTLESS LEGS SYNDROME): Chronic | ICD-10-CM

## 2023-01-04 DIAGNOSIS — F51.01 PRIMARY INSOMNIA: Chronic | ICD-10-CM

## 2023-01-04 RX ORDER — SILDENAFIL 50 MG/1
50 TABLET, FILM COATED ORAL DAILY PRN
Qty: 30 TABLET | Refills: 5 | Status: SHIPPED | OUTPATIENT
Start: 2023-01-04

## 2023-01-04 RX ORDER — GABAPENTIN 300 MG/1
CAPSULE ORAL
Qty: 90 CAPSULE | Refills: 1 | Status: SHIPPED | OUTPATIENT
Start: 2023-01-04

## 2023-01-04 RX ORDER — TEMAZEPAM 15 MG/1
15 CAPSULE ORAL NIGHTLY
Qty: 90 CAPSULE | Refills: 0 | Status: SHIPPED | OUTPATIENT
Start: 2023-01-04

## 2023-01-04 RX ORDER — ROSUVASTATIN CALCIUM 5 MG/1
5 TABLET, COATED ORAL DAILY
Qty: 90 TABLET | Refills: 1 | Status: SHIPPED | OUTPATIENT
Start: 2023-01-04

## 2023-01-04 RX ORDER — LEVOTHYROXINE SODIUM 0.05 MG/1
50 TABLET ORAL DAILY
Qty: 90 TABLET | Refills: 1 | Status: SHIPPED | OUTPATIENT
Start: 2023-01-04

## 2023-01-04 RX ORDER — CLONAZEPAM 0.5 MG/1
TABLET ORAL
Qty: 30 TABLET | Refills: 2 | Status: SHIPPED | OUTPATIENT
Start: 2023-01-04

## 2023-01-04 RX ORDER — AZELASTINE 1 MG/ML
SPRAY, METERED NASAL
Qty: 1 EACH | Refills: 5 | Status: SHIPPED | OUTPATIENT
Start: 2023-01-04

## 2023-01-04 NOTE — TELEPHONE ENCOUNTER
----- Message from Tyler Doss sent at 1/4/2023  3:09 PM EST -----  Regarding: Move scrips to [facility]  Contact: [phone]  [facility] is no longer accepting [facility] so I need to move all of my meds to [facility].  Please set the following up as 90 day scrips: Temazapam 15mg, Levothyroxine 50 mcg, Gabapentin 300 mg, Rosuvastatin 5 mg, all of the other scrips can be set up as 30 day scrips.  Thanks and let me know if you have any questions.

## 2023-03-07 ENCOUNTER — OFFICE VISIT (OUTPATIENT)
Dept: INTERNAL MEDICINE | Facility: CLINIC | Age: 63
End: 2023-03-07
Payer: COMMERCIAL

## 2023-03-07 ENCOUNTER — LAB (OUTPATIENT)
Dept: INTERNAL MEDICINE | Facility: CLINIC | Age: 63
End: 2023-03-07
Payer: COMMERCIAL

## 2023-03-07 VITALS
HEART RATE: 65 BPM | HEIGHT: 74 IN | WEIGHT: 198 LBS | SYSTOLIC BLOOD PRESSURE: 112 MMHG | DIASTOLIC BLOOD PRESSURE: 76 MMHG | TEMPERATURE: 96.9 F | BODY MASS INDEX: 25.41 KG/M2 | OXYGEN SATURATION: 98 %

## 2023-03-07 DIAGNOSIS — E55.9 VITAMIN D DEFICIENCY: ICD-10-CM

## 2023-03-07 DIAGNOSIS — K59.09 CHRONIC CONSTIPATION: Chronic | ICD-10-CM

## 2023-03-07 DIAGNOSIS — Z12.5 PROSTATE CANCER SCREENING: ICD-10-CM

## 2023-03-07 DIAGNOSIS — F41.9 ANXIETY: Chronic | ICD-10-CM

## 2023-03-07 DIAGNOSIS — D80.2 IGA DEFICIENCY: ICD-10-CM

## 2023-03-07 DIAGNOSIS — Z00.00 ROUTINE GENERAL MEDICAL EXAMINATION AT A HEALTH CARE FACILITY: ICD-10-CM

## 2023-03-07 DIAGNOSIS — F51.01 PRIMARY INSOMNIA: Chronic | ICD-10-CM

## 2023-03-07 DIAGNOSIS — E78.00 PURE HYPERCHOLESTEROLEMIA: Chronic | ICD-10-CM

## 2023-03-07 DIAGNOSIS — E03.9 HYPOTHYROIDISM (ACQUIRED): Chronic | ICD-10-CM

## 2023-03-07 DIAGNOSIS — G25.81 RLS (RESTLESS LEGS SYNDROME): Chronic | ICD-10-CM

## 2023-03-07 DIAGNOSIS — Z23 NEED FOR TDAP VACCINATION: ICD-10-CM

## 2023-03-07 DIAGNOSIS — R25.2 LEG CRAMPS: Chronic | ICD-10-CM

## 2023-03-07 DIAGNOSIS — Z00.00 ROUTINE GENERAL MEDICAL EXAMINATION AT A HEALTH CARE FACILITY: Primary | ICD-10-CM

## 2023-03-07 LAB
25(OH)D3 SERPL-MCNC: 33.6 NG/ML (ref 30–100)
ALBUMIN SERPL-MCNC: 4.5 G/DL (ref 3.5–5.2)
ALBUMIN/GLOB SERPL: 1.8 G/DL
ALP SERPL-CCNC: 50 U/L (ref 39–117)
ALT SERPL W P-5'-P-CCNC: 31 U/L (ref 1–41)
ANION GAP SERPL CALCULATED.3IONS-SCNC: 10 MMOL/L (ref 5–15)
AST SERPL-CCNC: 25 U/L (ref 1–40)
BILIRUB BLD-MCNC: NEGATIVE MG/DL
BILIRUB SERPL-MCNC: 0.4 MG/DL (ref 0–1.2)
BUN SERPL-MCNC: 11 MG/DL (ref 8–23)
BUN/CREAT SERPL: 12.4 (ref 7–25)
CALCIUM SPEC-SCNC: 9.6 MG/DL (ref 8.6–10.5)
CHLORIDE SERPL-SCNC: 100 MMOL/L (ref 98–107)
CHOLEST SERPL-MCNC: 153 MG/DL (ref 0–200)
CLARITY, POC: CLEAR
CO2 SERPL-SCNC: 25 MMOL/L (ref 22–29)
COLOR UR: YELLOW
CREAT SERPL-MCNC: 0.89 MG/DL (ref 0.76–1.27)
DEPRECATED RDW RBC AUTO: 43.1 FL (ref 37–54)
EGFRCR SERPLBLD CKD-EPI 2021: 95.7 ML/MIN/1.73
ERYTHROCYTE [DISTWIDTH] IN BLOOD BY AUTOMATED COUNT: 12.8 % (ref 12.3–15.4)
EXPIRATION DATE: NORMAL
GLOBULIN UR ELPH-MCNC: 2.5 GM/DL
GLUCOSE SERPL-MCNC: 83 MG/DL (ref 65–99)
GLUCOSE UR STRIP-MCNC: NEGATIVE MG/DL
HCT VFR BLD AUTO: 46 % (ref 37.5–51)
HDLC SERPL-MCNC: 41 MG/DL (ref 40–60)
HGB BLD-MCNC: 15.5 G/DL (ref 13–17.7)
IGA1 MFR SER: 77 MG/DL (ref 70–400)
IGG1 SER-MCNC: 853 MG/DL (ref 700–1600)
IGM SERPL-MCNC: 36 MG/DL (ref 40–230)
KETONES UR QL: NEGATIVE
LDLC SERPL CALC-MCNC: 91 MG/DL (ref 0–100)
LDLC/HDLC SERPL: 2.16 {RATIO}
LEUKOCYTE EST, POC: NEGATIVE
Lab: NORMAL
MCH RBC QN AUTO: 31 PG (ref 26.6–33)
MCHC RBC AUTO-ENTMCNC: 33.7 G/DL (ref 31.5–35.7)
MCV RBC AUTO: 92 FL (ref 79–97)
NITRITE UR-MCNC: NEGATIVE MG/ML
PH UR: 6.5 [PH] (ref 5–8)
PLATELET # BLD AUTO: 264 10*3/MM3 (ref 140–450)
PMV BLD AUTO: 10.9 FL (ref 6–12)
POTASSIUM SERPL-SCNC: 4.3 MMOL/L (ref 3.5–5.2)
PROT SERPL-MCNC: 7 G/DL (ref 6–8.5)
PROT UR STRIP-MCNC: NEGATIVE MG/DL
PSA SERPL-MCNC: 0.28 NG/ML (ref 0–4)
RBC # BLD AUTO: 5 10*6/MM3 (ref 4.14–5.8)
RBC # UR STRIP: NEGATIVE /UL
SODIUM SERPL-SCNC: 135 MMOL/L (ref 136–145)
SP GR UR: 1 (ref 1–1.03)
TRIGL SERPL-MCNC: 118 MG/DL (ref 0–150)
TSH SERPL DL<=0.05 MIU/L-ACNC: 2.78 UIU/ML (ref 0.27–4.2)
UROBILINOGEN UR QL: NORMAL
VLDLC SERPL-MCNC: 21 MG/DL (ref 5–40)
WBC NRBC COR # BLD: 5.54 10*3/MM3 (ref 3.4–10.8)

## 2023-03-07 PROCEDURE — 81003 URINALYSIS AUTO W/O SCOPE: CPT | Performed by: INTERNAL MEDICINE

## 2023-03-07 PROCEDURE — 83695 ASSAY OF LIPOPROTEIN(A): CPT | Performed by: INTERNAL MEDICINE

## 2023-03-07 PROCEDURE — 80053 COMPREHEN METABOLIC PANEL: CPT | Performed by: INTERNAL MEDICINE

## 2023-03-07 PROCEDURE — 80061 LIPID PANEL: CPT | Performed by: INTERNAL MEDICINE

## 2023-03-07 PROCEDURE — 82784 ASSAY IGA/IGD/IGG/IGM EACH: CPT | Performed by: INTERNAL MEDICINE

## 2023-03-07 PROCEDURE — 90471 IMMUNIZATION ADMIN: CPT | Performed by: INTERNAL MEDICINE

## 2023-03-07 PROCEDURE — 90715 TDAP VACCINE 7 YRS/> IM: CPT | Performed by: INTERNAL MEDICINE

## 2023-03-07 PROCEDURE — 99396 PREV VISIT EST AGE 40-64: CPT | Performed by: INTERNAL MEDICINE

## 2023-03-07 PROCEDURE — 36415 COLL VENOUS BLD VENIPUNCTURE: CPT | Performed by: INTERNAL MEDICINE

## 2023-03-07 PROCEDURE — 84443 ASSAY THYROID STIM HORMONE: CPT | Performed by: INTERNAL MEDICINE

## 2023-03-07 PROCEDURE — 85027 COMPLETE CBC AUTOMATED: CPT | Performed by: INTERNAL MEDICINE

## 2023-03-07 PROCEDURE — G0103 PSA SCREENING: HCPCS | Performed by: INTERNAL MEDICINE

## 2023-03-07 PROCEDURE — 82306 VITAMIN D 25 HYDROXY: CPT | Performed by: INTERNAL MEDICINE

## 2023-03-07 RX ORDER — PLECANATIDE 3 MG/1
3 TABLET ORAL DAILY PRN
COMMUNITY

## 2023-03-07 NOTE — PROGRESS NOTES
History of Present Illness   Here for a physical    Exercise:gym 3x/week and doing home projects which keep him physically active  Diet: healthy overall, not a lot of sweets; MVI, D, C, Mg daily    Insomnia-patient is on temazepam 15 mg at night.  This was filled 2 months ago for 45 days.  Other than the restless legs, he does feel like he sleeps well with the temazepam     Leg cramps and RLS- have been a little worse recently. Uses foam roller nightly which helps some.  Patient on gabapentin 300 mg at night.  This also was filled  2 months ago for 90 days.  Will use klonopin just occasionally if the muscles are cramping a lot.    Klonopin was filled last 3 months ago     Hypothyroid- he is on Synthroid 50.  Last TSH was  5/22 and was 2.0. energy levels good.     chronic constipation -he sees GI and he takes miralax daily as well as Linzess.will use trulance some too as needed.  Does have to be careful with his diet and eat  plenty of roughage     Hyperlipidemia-he is on Crestor 5 and last FLP was 2/22 and LDL was 65. A little sore w/ crestor but tolerable. Not on coq10; he is wife would like for him to get Lp(a) checked      FH of IgA deficiency -his son. His IgA was 64 when rechecked last year and we were going to check the other Ig levels but lab was closed when he was here last      Current Outpatient Medications on File Prior to Visit   Medication Sig Dispense Refill   • aspirin 81 MG chewable tablet Chew 1 tablet Daily. 30 tablet 0   • azelastine (Astelin) 0.1 % nasal spray 2 sprays each mostril bid 1 each 5   • clonazePAM (KlonoPIN) 0.5 MG tablet 1 qd prn anxiety 30 tablet 2   • colchicine 0.6 MG tablet Take 1 tablet by mouth 3 (Three) Times a Day. 90 tablet 2   • fluticasone (FLONASE) 50 MCG/ACT nasal spray 2 sprays into the nostril(s) as directed by provider Daily.     • gabapentin (NEURONTIN) 300 MG capsule TAKE 1 CAPSULE BY MOUTH  EVERY NIGHT AT BEDTIME 90 capsule 1   • levothyroxine (Synthroid) 50 MCG  tablet Take 1 tablet by mouth Daily. 90 tablet 1   • linaclotide (Linzess) 290 MCG capsule capsule Take 1 capsule by mouth Every Morning Before Breakfast. 30 capsule 11   • omeprazole (priLOSEC) 20 MG capsule Take 1 capsule by mouth Daily.     • Plecanatide (Trulance) 3 MG tablet Take 1 tablet by mouth Daily As Needed.     • rosuvastatin (Crestor) 5 MG tablet Take 1 tablet by mouth Daily. 90 tablet 1   • sildenafil (VIAGRA) 50 MG tablet Take 1 tablet by mouth Daily As Needed for Erectile Dysfunction. 30 tablet 5   • temazepam (RESTORIL) 15 MG capsule Take 1 capsule by mouth Every Night. 90 capsule 0   • [DISCONTINUED] sodium-potassium-magnesium sulfates (Suprep Bowel Prep Kit) 17.5-3.13-1.6 GM/177ML solution oral solution Use as directed by provider for colonoscopy prep 354 mL 0     No current facility-administered medications on file prior to visit.       The following portions of the patient's history were reviewed and updated as appropriate: allergies, current medications, past family history, past medical history, past social history, past surgical history and problem list.    Review of Systems   Constitutional: Positive for unexpected weight loss. Negative for activity change, appetite change, fever and unexpected weight gain.   HENT: Negative.    Eyes: Negative.    Respiratory: Negative for shortness of breath and wheezing.    Cardiovascular: Negative for chest pain, palpitations and leg swelling.   Gastrointestinal: Positive for constipation. Negative for GERD (controlled w/ omperozole).   Endocrine: Negative.    Genitourinary: Positive for frequency (1-2x/night). Negative for difficulty urinating and dysuria.   Musculoskeletal: Positive for myalgias.   Skin: Negative.    Allergic/Immunologic: Negative for immunocompromised state.   Neurological: Negative for seizures, speech difficulty, memory problem and confusion.   Hematological: Does not bruise/bleed easily.   Psychiatric/Behavioral: Negative for  "agitation.         Objective   /76 (BP Location: Left arm, Patient Position: Sitting)   Pulse 65   Temp 96.9 °F (36.1 °C) (Infrared)   Ht 188.5 cm (74.2\")   Wt 89.8 kg (198 lb)   SpO2 98%   BMI 25.28 kg/m²   Physical Exam   Physical Exam      Assessment/Plan   Diagnoses and all orders for this visit:    1. Routine general medical examination at a health care facility (Primary)  -     PSA Screen; Future  -     CBC (No Diff); Future  -     Comprehensive Metabolic Panel; Future  -     Lipid Panel; Future  -     TSH; Future  -     POC Urinalysis Dipstick, Automated  -     Lipoprotein A (LPA); Future  Regular exercise, healthy diet. Sunscreen use encouraged  Check fasting labs  DT due this year-give today  Colon due 8/25 (every 5 years)  Shingles - he got 1st shingrex in 1/23  Prostate screening -today    2. Chronic constipation-stable on current regimen    3. RLS (restless legs syndrome)-we will raise gabapentin to 600 mg.  We will try 2 of the 300 mg capsules he has at home and let me know how he does.  If he tolerates the 600 at night I can send it in at 600.  If he does feel groggy the next day we could try 400 or 500 mg at night using the 100 mg dose.    4. Hypothyroidism (acquired)-recheck today  -     TSH; Future    5. Leg cramps-see above regarding gabapentin    6. Pure hypercholesterolemia-on Crestor.  Check levels along with LP(a)  -     Lipoprotein A (LPA); Future    7. Primary insomnia-stable with temazepam.    8. Anxiety-stable, uses Klonopin just rarely    9. IgA deficiency (HCC)-we will check the other Ig levels today  -     IgG, IgA, IgM; Future    10. Prostate cancer screening  -     PSA Screen; Future    11. Vitamin D deficiency-check vitamin D level  -     Vitamin D 25 hydroxy; Future    12. Need for Tdap vaccination  -     Tdap Vaccine Greater Than or Equal To 6yo IM      For gabapentin, temazepam, and Klonopin, he has signed controlled substance contract.  Hema is appropriate.  UDS will " be due 9/23

## 2023-03-08 LAB — LPA SERPL-SCNC: 10.3 NMOL/L

## 2023-04-26 DIAGNOSIS — G25.81 RLS (RESTLESS LEGS SYNDROME): Chronic | ICD-10-CM

## 2023-04-26 DIAGNOSIS — F51.01 PRIMARY INSOMNIA: Chronic | ICD-10-CM

## 2023-04-26 RX ORDER — GABAPENTIN 300 MG/1
CAPSULE ORAL
Qty: 90 CAPSULE | Refills: 1 | Status: CANCELLED | OUTPATIENT
Start: 2023-04-26

## 2023-04-26 RX ORDER — LEVOTHYROXINE SODIUM 0.05 MG/1
50 TABLET ORAL DAILY
Qty: 90 TABLET | Refills: 1 | Status: CANCELLED | OUTPATIENT
Start: 2023-04-26

## 2023-04-27 RX ORDER — TEMAZEPAM 15 MG/1
15 CAPSULE ORAL NIGHTLY
Qty: 90 CAPSULE | Refills: 0 | Status: SHIPPED | OUTPATIENT
Start: 2023-04-27

## 2023-04-27 NOTE — TELEPHONE ENCOUNTER
LV: 3/7/23  NV: 6/7/23  LF: Gabapentin 1/4/23 90/1 should have refills at [facility]; 1/4/23 temazepam 90/0; Levothyroxine 1/4/23 90/1 should [facility]ills at [facility].  CSC: 5/9/22 needs a new one at next visit  UDS: 9/9/22    Dr. Onofre can you send in the temazepam for him.  I called and explained to him he probably just filled his last fill on the other two and Dr. Shafer will refill at his next visit.

## 2023-05-31 ENCOUNTER — OFFICE VISIT (OUTPATIENT)
Dept: INTERNAL MEDICINE | Facility: CLINIC | Age: 63
End: 2023-05-31

## 2023-05-31 VITALS
DIASTOLIC BLOOD PRESSURE: 68 MMHG | BODY MASS INDEX: 25.93 KG/M2 | SYSTOLIC BLOOD PRESSURE: 110 MMHG | WEIGHT: 202 LBS | HEART RATE: 80 BPM | HEIGHT: 74 IN | OXYGEN SATURATION: 96 %

## 2023-05-31 DIAGNOSIS — E03.9 HYPOTHYROIDISM (ACQUIRED): Chronic | ICD-10-CM

## 2023-05-31 DIAGNOSIS — E78.00 PURE HYPERCHOLESTEROLEMIA: Chronic | ICD-10-CM

## 2023-05-31 DIAGNOSIS — G25.81 RLS (RESTLESS LEGS SYNDROME): Primary | Chronic | ICD-10-CM

## 2023-05-31 DIAGNOSIS — F51.01 PRIMARY INSOMNIA: Chronic | ICD-10-CM

## 2023-05-31 PROCEDURE — 99214 OFFICE O/P EST MOD 30 MIN: CPT | Performed by: INTERNAL MEDICINE

## 2023-05-31 RX ORDER — ROPINIROLE 0.25 MG/1
TABLET, FILM COATED ORAL
Qty: 60 TABLET | Refills: 0 | Status: SHIPPED | OUTPATIENT
Start: 2023-05-31

## 2023-05-31 RX ORDER — ROSUVASTATIN CALCIUM 5 MG/1
5 TABLET, COATED ORAL DAILY
Qty: 90 TABLET | Refills: 1 | Status: SHIPPED | OUTPATIENT
Start: 2023-05-31

## 2023-05-31 RX ORDER — GABAPENTIN 300 MG/1
CAPSULE ORAL
Qty: 180 CAPSULE | Refills: 1 | Status: SHIPPED | OUTPATIENT
Start: 2023-05-31

## 2023-05-31 RX ORDER — LEVOTHYROXINE SODIUM 0.05 MG/1
50 TABLET ORAL DAILY
Qty: 90 TABLET | Refills: 1 | Status: SHIPPED | OUTPATIENT
Start: 2023-05-31

## 2023-05-31 NOTE — PROGRESS NOTES
Chief Complaint  Anxiety, Insomnia, and Restless Legs Syndrome (He has sx for awhile and it has gotten worse.  His legs are unable to relax.)    Subjective          Tyler Doss presents to [facility]  History of Present Illness    Tyler Doss presents today for restless leg syndrome.    RLS-when he was here in March, we did raise gabapentin from 300 mg at night to 600 mg at night and he has been doing the higher dose of gabapentin and it does help some but not completely.  He states his legs are not able to relax, more stiffness then restlessness in thighs and calves bilaterally.  No leg pain during the day, but he notes it happens when he goes to bed, but has been using the foam roller, massage gun, and stretching prior to bedtime but still with a lot of pain in the muscles of the lower extremities.  Does not generally have the same symptoms in his arms.  He does also have leg cramps but these have not been as bad in recent months. The patient reports he does not notice a difference with his legs since taking Crestor.  No numbness and tingling in his legs and feet. He notes 3-4 times a week it gets really bad and will take 1/2 Klonopin which helps some as well    Insomnia -patient takes temazepam nightly    Current Outpatient Medications:   •  aspirin 81 MG chewable tablet, Chew 1 tablet Daily., Disp: 30 tablet, Rfl: 0  •  azelastine (Astelin) 0.1 % nasal spray, 2 sprays each mostril bid, Disp: 1 each, Rfl: 5  •  clonazePAM (KlonoPIN) 0.5 MG tablet, 1 qd prn anxiety, Disp: 30 tablet, Rfl: 2  •  fluticasone (FLONASE) 50 MCG/ACT nasal spray, 2 sprays into the nostril(s) as directed by provider Daily., Disp: , Rfl:   •  gabapentin (NEURONTIN) 300 MG capsule, TAKE 1-2 CAPSULES BY MOUTH  EVERY NIGHT AT BEDTIME, Disp: 180 capsule, Rfl: 1  •  levothyroxine (Synthroid) 50 MCG tablet, Take 1 tablet by mouth Daily., Disp: 90 tablet, Rfl: 1  •  linaclotide (Linzess) 290 MCG capsule  "capsule, Take 1 capsule by mouth Every Morning Before Breakfast., Disp: 30 capsule, Rfl: 11  •  omeprazole (priLOSEC) 20 MG capsule, Take 1 capsule by mouth Daily., Disp: , Rfl:   •  Plecanatide (Trulance) 3 MG tablet, Take 1 tablet by mouth Daily As Needed., Disp: , Rfl:   •  rosuvastatin (Crestor) 5 MG tablet, Take 1 tablet by mouth Daily., Disp: 90 tablet, Rfl: 1  •  sildenafil (VIAGRA) 50 MG tablet, Take 1 tablet by mouth Daily As Needed for Erectile Dysfunction., Disp: 30 tablet, Rfl: 5  •  temazepam (RESTORIL) 15 MG capsule, Take 1 capsule by mouth Every Night., Disp: 90 capsule, Rfl: 0  •  colchicine 0.6 MG tablet, Take 1 tablet by mouth 3 (Three) Times a Day. (Patient not taking: Reported on 5/31/2023), Disp: 90 tablet, Rfl: 2  •  rOPINIRole (REQUIP) 0.25 MG tablet, Take 1 hour before bedtime.1 qhs x 2nights, then increase to 2 tabs qhs, Disp: 60 tablet, Rfl: 0   The following portions of the patient's history were reviewed and updated as appropriate: allergies, current medications, past family history, past medical history, past social history, past surgical history and problem list.     Objective   Vital Signs:   /68 (BP Location: Right arm, Patient Position: Sitting)   Pulse 80   Ht 188.5 cm (74.2\")   Wt 91.6 kg (202 lb)   SpO2 96%   BMI 25.80 kg/m²       Physical exam  Constitutional: oriented to person, place, and time.  well-developed and well-nourished. No distress.   HENT:   Head: Normocephalic and atraumatic.   Eyes: Conjunctivae and EOM are normal.   Cardiovascular: Normal rate, regular rhythm and normal heart sounds.  Exam reveals no gallop and no friction rub.    No murmur heard.  Pulmonary/Chest: Effort normal and breath sounds normal. No respiratory distress.   no wheezes.   Neurological:  alert and oriented to person, place, and time. dtr 2+ patellar B  Skin: Skin is warm and dry. not diaphoretic.   PV: no edema, pedal pulses 2+  MS: calf and thigh muscles mildly " tender  Psychiatric:  normal mood and affect. behavior is normal. Judgment and thought content normal.      Result Review :                   Assessment and Plan    Diagnoses and all orders for this visit:    1. RLS (restless legs syndrome) (Primary)-not completely consistent with restless leg syndrome  Comments:  we will continue the gabapentin at 2 of the 300mg qhs. We will also add requip and see if it helps any with the symptoms that he has  Orders:  -     gabapentin (NEURONTIN) 300 MG capsule; TAKE 1-2 CAPSULES BY MOUTH  EVERY NIGHT AT BEDTIME  Dispense: 180 capsule; Refill: 1  -     rOPINIRole (REQUIP) 0.25 MG tablet; Take 1 hour before bedtime.1 qhs x 2nights, then increase to 2 tabs qhs  Dispense: 60 tablet; Refill: 0    2. Primary insomnia- on temazepam.     3. Pure hypercholesterolemia- the crestor may be contributing some to the leg discomfort, but we will continue for now  -     rosuvastatin (Crestor) 5 MG tablet; Take 1 tablet by mouth Daily.  Dispense: 90 tablet; Refill: 1    4. Hypothyroidism (acquired)  -     levothyroxine (Synthroid) 50 MCG tablet; Take 1 tablet by mouth Daily.  Dispense: 90 tablet; Refill: 1      For gabapentin, temazepam, and Klonopin, he has signed controlled substance contract.  Hema is appropriate.  UDS will be due 9/23      Follow Up   Return in about 3 months (around 8/31/2023) for Next scheduled follow up.  Patient was given instructions and counseling regarding his condition or for health maintenance advice. Please see specific information pulled into the AVS if appropriate.     Transcribed from ambient dictation for Ya Shafer MD by Noreen Rubi.  05/31/23   16:12 EDT    Patient or patient representative verbalized consent to the visit recording.  I have personally performed the services described in this document as transcribed by the above individual, and it is both accurate and complete.

## 2023-06-01 RX ORDER — ACETAZOLAMIDE 125 MG/1
125 TABLET ORAL 2 TIMES DAILY
Qty: 60 TABLET | Refills: 0 | Status: SHIPPED | OUTPATIENT
Start: 2023-06-01

## 2023-06-06 ENCOUNTER — TELEPHONE (OUTPATIENT)
Dept: INTERNAL MEDICINE | Facility: CLINIC | Age: 63
End: 2023-06-06

## 2023-06-06 DIAGNOSIS — G25.81 RLS (RESTLESS LEGS SYNDROME): Chronic | ICD-10-CM

## 2023-06-06 RX ORDER — GABAPENTIN 300 MG/1
CAPSULE ORAL
Qty: 30 CAPSULE | Refills: 0 | Status: SHIPPED | OUTPATIENT
Start: 2023-06-06

## 2023-06-06 NOTE — TELEPHONE ENCOUNTER
Pharmacy Name: [address] [phone] [facility] [phone]          Pharmacy representative phone number: [phone]    What medication are you calling in regards to: GABAPENTIN    What question does the pharmacy have: THE PHARMACY STATES THAT THE PATIENT TOLD HIM THAT THE MEDICATION WAS SENT TO MAIL ORDER BUT HIS MAIL ORDER SUPPLY WILL NOT ARRIVE ON TIME THE PATIENT IS REQUESTING THAT A SUPPLY BE FILLED BY THE LOCAL PHARMACY THE PHARMACY WOULD LIKE TO MAKE SURE THAT IT IS OKAY TO FILL THE MEDICATION FOR THE PATIENT BECAUSE IT IS CONTROLLED THE PHARMACY STATES THAT THEY ALREADY HAVE THE PRESCRIPTION THEY JUST NEED A VERBAL OK     Who is the provider that prescribed the medication: DOCTOR CUMMINGS

## 2023-08-30 ENCOUNTER — OFFICE VISIT (OUTPATIENT)
Dept: INTERNAL MEDICINE | Facility: CLINIC | Age: 63
End: 2023-08-30
Payer: COMMERCIAL

## 2023-08-30 VITALS
BODY MASS INDEX: 27.34 KG/M2 | HEIGHT: 74 IN | OXYGEN SATURATION: 95 % | TEMPERATURE: 97.5 F | SYSTOLIC BLOOD PRESSURE: 118 MMHG | DIASTOLIC BLOOD PRESSURE: 68 MMHG | WEIGHT: 213 LBS | HEART RATE: 80 BPM

## 2023-08-30 DIAGNOSIS — G25.81 RLS (RESTLESS LEGS SYNDROME): Primary | Chronic | ICD-10-CM

## 2023-08-30 DIAGNOSIS — F51.01 PRIMARY INSOMNIA: Chronic | ICD-10-CM

## 2023-08-30 DIAGNOSIS — E78.00 PURE HYPERCHOLESTEROLEMIA: Chronic | ICD-10-CM

## 2023-08-30 DIAGNOSIS — R25.2 LEG CRAMPS: Chronic | ICD-10-CM

## 2023-08-30 DIAGNOSIS — Z79.899 HIGH RISK MEDICATIONS (NOT ANTICOAGULANTS) LONG-TERM USE: ICD-10-CM

## 2023-08-30 DIAGNOSIS — F41.9 ANXIETY: ICD-10-CM

## 2023-08-30 DIAGNOSIS — E03.9 HYPOTHYROIDISM (ACQUIRED): Chronic | ICD-10-CM

## 2023-08-30 DIAGNOSIS — G25.81 RLS (RESTLESS LEGS SYNDROME): Chronic | ICD-10-CM

## 2023-08-30 PROCEDURE — 99214 OFFICE O/P EST MOD 30 MIN: CPT | Performed by: INTERNAL MEDICINE

## 2023-08-30 RX ORDER — GABAPENTIN 300 MG/1
CAPSULE ORAL
Qty: 60 CAPSULE | Refills: 2 | Status: SHIPPED | OUTPATIENT
Start: 2023-08-30

## 2023-08-30 RX ORDER — CLONAZEPAM 0.5 MG/1
TABLET ORAL
Qty: 30 TABLET | Refills: 2 | Status: SHIPPED | OUTPATIENT
Start: 2023-08-30

## 2023-08-30 RX ORDER — TEMAZEPAM 15 MG/1
15 CAPSULE ORAL NIGHTLY
Qty: 30 CAPSULE | Refills: 2 | Status: SHIPPED | OUTPATIENT
Start: 2023-08-30

## 2023-08-30 NOTE — PROGRESS NOTES
Chief Complaint  Insomnia and Muscle Pain (He has been having a lot of muscle pain from the statin.)    Subjective          Tyler Doss presents to [facility]  History of Present Illness    Restless leg syndrome-he is currently taking gabapentin 600 mg at night.  We also added Requip when he was here 3 months ago. Does help - he is taking 1 qhs of it.  Will stretch, and use massage gun as well - feels like the symptoms are under fair control    Insomnia-patient is on temazepam at night and sleep is ok    Hyperlipidemia - on crestor daily but does feel achy overall w/ it    R Shoulder pain - did PT and is doing better, has f/u w/ ortho tomorrow    Current Outpatient Medications:     aspirin 81 MG chewable tablet, Chew 1 tablet Daily., Disp: 30 tablet, Rfl: 0    azelastine (Astelin) 0.1 % nasal spray, 2 sprays each mostril bid, Disp: 1 each, Rfl: 5    clonazePAM (KlonoPIN) 0.5 MG tablet, 1 qd prn anxiety, Disp: 30 tablet, Rfl: 2    fluticasone (FLONASE) 50 MCG/ACT nasal spray, 2 sprays into the nostril(s) as directed by provider Daily., Disp: , Rfl:     gabapentin (NEURONTIN) 300 MG capsule, TAKE 2 CAPSULES BY MOUTH  EVERY NIGHT AT BEDTIME, Disp: 60 capsule, Rfl: 2    levothyroxine (Synthroid) 50 MCG tablet, Take 1 tablet by mouth Daily., Disp: 90 tablet, Rfl: 1    linaclotide (Linzess) 290 MCG capsule capsule, Take 1 capsule by mouth Every Morning Before Breakfast., Disp: 30 capsule, Rfl: 11    omeprazole (priLOSEC) 20 MG capsule, Take 1 capsule by mouth Daily., Disp: , Rfl:     Plecanatide (Trulance) 3 MG tablet, Take 1 tablet by mouth Daily As Needed., Disp: , Rfl:     rOPINIRole (REQUIP) 0.25 MG tablet, Take 1 hour before bedtime.1 qhs, Disp: 90 tablet, Rfl: 3    rosuvastatin (Crestor) 5 MG tablet, Take 1 tablet by mouth Daily., Disp: 90 tablet, Rfl: 1    sildenafil (VIAGRA) 50 MG tablet, Take 1 tablet by mouth Daily As Needed for Erectile Dysfunction., Disp: 30 tablet, Rfl:  "5    temazepam (RESTORIL) 15 MG capsule, Take 1 capsule by mouth Every Night., Disp: 30 capsule, Rfl: 2    acetaZOLAMIDE (DIAMOX) 125 MG tablet, Take 1 tablet by mouth 2 (Two) Times a Day. Start 1 day before ascent (Patient not taking: Reported on 8/30/2023), Disp: 60 tablet, Rfl: 0   The following portions of the patient's history were reviewed and updated as appropriate: allergies, current medications, past family history, past medical history, past social history, past surgical history and problem list.     Objective   Vital Signs:   /68 (BP Location: Right arm, Patient Position: Sitting)   Pulse 80   Temp 97.5 øF (36.4 øC) (Infrared)   Ht 188.5 cm (74.2\")   Wt 96.6 kg (213 lb)   SpO2 95%   BMI 27.20 kg/mý       Physical exam  Constitutional: oriented to person, place, and time.  well-developed and well-nourished. No distress.   HENT:   Head: Normocephalic and atraumatic.   Eyes: Conjunctivae and EOM are normal.   Cardiovascular: Normal rate, regular rhythm and normal heart sounds.  Exam reveals no gallop and no friction rub.    No murmur heard.  Pulmonary/Chest: Effort normal and breath sounds normal. No respiratory distress.   no wheezes.   Neurological:  alert and oriented to person, place, and time.   Skin: Skin is warm and dry. not diaphoretic.   PV: no edema, pulses 2+   Psychiatric:  normal mood and affect. behavior is normal. Judgment and thought content normal.      Result Review :   The following data was reviewed by: Ya Shafer MD on 08/30/2023:  CMP          3/7/2023    09:31   CMP   Glucose 83    BUN 11    Creatinine 0.89    EGFR 95.7    Sodium 135    Potassium 4.3    Chloride 100    Calcium 9.6    Total Protein 7.0    Albumin 4.5    Globulin 2.5    Total Bilirubin 0.4    Alkaline Phosphatase 50    AST (SGOT) 25    ALT (SGPT) 31    Albumin/Globulin Ratio 1.8    BUN/Creatinine Ratio 12.4    Anion Gap 10.0      Lipid Panel          3/7/2023    09:31   Lipid Panel   Total Cholesterol " 153    Triglycerides 118    HDL Cholesterol 41    VLDL Cholesterol 21    LDL Cholesterol  91    LDL/HDL Ratio 2.16      TSH          3/7/2023    09:31   TSH   TSH 2.780      Lab Results   Component Value Date    EAIX37VY 33.6 03/07/2023    ACRPHRBO82 1,258 (H) 12/26/2019               Assessment and Plan    Diagnoses and all orders for this visit:    1. RLS (restless legs syndrome) (Primary)- stable on current regimen  -     Urine Drug Screen - Urine, Clean Catch; Future  -     gabapentin (NEURONTIN) 300 MG capsule; TAKE 2 CAPSULES BY MOUTH  EVERY NIGHT AT BEDTIME  Dispense: 60 capsule; Refill: 2    2. Leg cramps    3. Primary insomnia- stable on temazepam  -     Urine Drug Screen - Urine, Clean Catch; Future  -     temazepam (RESTORIL) 15 MG capsule; Take 1 capsule by mouth Every Night.  Dispense: 30 capsule; Refill: 2    4. Hypothyroidism (acquired)- recheck at wellness exam (or 3/24)    5. Pure hypercholesterolemia- can decrease crestor to 3days a week because of mylagias. Recheck in 3/24    6. High risk medications (not anticoagulants) long-term use  -     Urine Drug Screen - Urine, Clean Catch; Future    7. Anxiety  Comments:  Uses Klonopin just occasionally.  He would like to go ahead and refill  Orders:  -     clonazePAM (KlonoPIN) 0.5 MG tablet; 1 qd prn anxiety  Dispense: 30 tablet; Refill: 2    For gabapentin, temazepam, and Klonopin, he has signed controlled substance contract.  Hema is appropriate.  UDS due 9/23 - we will do today      Follow Up   Return in about 3 months (around 11/30/2023).  Patient was given instructions and counseling regarding his condition or for health maintenance advice. Please see specific information pulled into the AVS if appropriate.

## 2023-08-31 ENCOUNTER — OFFICE VISIT (OUTPATIENT)
Dept: ORTHOPEDIC SURGERY | Facility: CLINIC | Age: 63
End: 2023-08-31
Payer: COMMERCIAL

## 2023-08-31 VITALS
WEIGHT: 213 LBS | BODY MASS INDEX: 27.34 KG/M2 | DIASTOLIC BLOOD PRESSURE: 78 MMHG | SYSTOLIC BLOOD PRESSURE: 116 MMHG | HEIGHT: 74 IN

## 2023-08-31 DIAGNOSIS — M19.011 OSTEOARTHRITIS OF RIGHT ACROMIOCLAVICULAR JOINT: ICD-10-CM

## 2023-08-31 DIAGNOSIS — M75.81 RIGHT ROTATOR CUFF TENDINITIS: Primary | ICD-10-CM

## 2023-08-31 RX ORDER — LIDOCAINE HYDROCHLORIDE 10 MG/ML
4 INJECTION, SOLUTION EPIDURAL; INFILTRATION; INTRACAUDAL; PERINEURAL
Status: COMPLETED | OUTPATIENT
Start: 2023-08-31 | End: 2023-08-31

## 2023-08-31 RX ORDER — ROPIVACAINE HYDROCHLORIDE 5 MG/ML
4 INJECTION, SOLUTION EPIDURAL; INFILTRATION; PERINEURAL
Status: COMPLETED | OUTPATIENT
Start: 2023-08-31 | End: 2023-08-31

## 2023-08-31 RX ORDER — TRIAMCINOLONE ACETONIDE 40 MG/ML
80 INJECTION, SUSPENSION INTRA-ARTICULAR; INTRAMUSCULAR
Status: COMPLETED | OUTPATIENT
Start: 2023-08-31 | End: 2023-08-31

## 2023-08-31 RX ADMIN — TRIAMCINOLONE ACETONIDE 80 MG: 40 INJECTION, SUSPENSION INTRA-ARTICULAR; INTRAMUSCULAR at 09:46

## 2023-08-31 RX ADMIN — ROPIVACAINE HYDROCHLORIDE 4 ML: 5 INJECTION, SOLUTION EPIDURAL; INFILTRATION; PERINEURAL at 09:46

## 2023-08-31 RX ADMIN — LIDOCAINE HYDROCHLORIDE 4 ML: 10 INJECTION, SOLUTION EPIDURAL; INFILTRATION; INTRACAUDAL; PERINEURAL at 09:46

## 2023-08-31 NOTE — PROGRESS NOTES
[facility] [place] [facility] Follow Up Visit     Office Follow Up      Date: 08/31/2023   Patient Name: Tyler Doss  MRN: [id number]  YOB: 1960    Referring Physician: No ref. provider found     Chief Complaint:   Chief Complaint   Patient presents with    Follow-up     7 week f/u Right shoulder pain, Osteoarthritis of right acromioclavicular joint, and Right rotator cuff tendinitis       History of Present Illness: Tyler Doss is a 62 y.o. male who is here today for follow up on right shoulder pain from rotator cuff tendinitis and AC joint osteoarthritis.  Since last visit, he has been on meloxicam and going to physical therapy.  His pain is greatly improved and now down to a 2/10.  His range of motion is improved in all planes except for internal rotation.  Overall, he feels improved.    Subjective   Review of Systems: Review of Systems   Constitutional:  Negative for chills, fever, unexpected weight gain and unexpected weight loss.   HENT:  Negative for congestion, postnasal drip and rhinorrhea.    Eyes:  Negative for blurred vision.   Respiratory:  Negative for shortness of breath.    Cardiovascular:  Negative for leg swelling.   Gastrointestinal:  Negative for abdominal pain, nausea and vomiting.   Genitourinary:  Negative for difficulty urinating.   Musculoskeletal:  Positive for arthralgias. Negative for gait problem, joint swelling and myalgias.   Skin:  Negative for skin lesions and wound.   Neurological:  Negative for dizziness, weakness, light-headedness and numbness.   Hematological:  Does not bruise/bleed easily.   Psychiatric/Behavioral:  Negative for depressed mood.       Medications:   Current Outpatient Medications:     acetaZOLAMIDE (DIAMOX) 125 MG tablet, Take 1 tablet by mouth 2 (Two) Times a Day. Start 1 day before ascent, Disp: 60 tablet, Rfl: 0    aspirin 81 MG chewable tablet, Chew 1 tablet Daily., Disp: 30 tablet,  "Rfl: 0    azelastine (Astelin) 0.1 % nasal spray, 2 sprays each mostril bid, Disp: 1 each, Rfl: 5    clonazePAM (KlonoPIN) 0.5 MG tablet, 1 qd prn anxiety, Disp: 30 tablet, Rfl: 2    fluticasone (FLONASE) 50 MCG/ACT nasal spray, 2 sprays into the nostril(s) as directed by provider Daily., Disp: , Rfl:     gabapentin (NEURONTIN) 300 MG capsule, TAKE 2 CAPSULES BY MOUTH  EVERY NIGHT AT BEDTIME, Disp: 60 capsule, Rfl: 2    levothyroxine (Synthroid) 50 MCG tablet, Take 1 tablet by mouth Daily., Disp: 90 tablet, Rfl: 1    linaclotide (Linzess) 290 MCG capsule capsule, Take 1 capsule by mouth Every Morning Before Breakfast., Disp: 30 capsule, Rfl: 11    omeprazole (priLOSEC) 20 MG capsule, Take 1 capsule by mouth Daily., Disp: , Rfl:     Plecanatide (Trulance) 3 MG tablet, Take 1 tablet by mouth Daily As Needed., Disp: , Rfl:     rOPINIRole (REQUIP) 0.25 MG tablet, Take 1 hour before bedtime.1 qhs, Disp: 90 tablet, Rfl: 3    rosuvastatin (Crestor) 5 MG tablet, Take 1 tablet by mouth Daily., Disp: 90 tablet, Rfl: 1    sildenafil (VIAGRA) 50 MG tablet, Take 1 tablet by mouth Daily As Needed for Erectile Dysfunction., Disp: 30 tablet, Rfl: 5    temazepam (RESTORIL) 15 MG capsule, Take 1 capsule by mouth Every Night., Disp: 30 capsule, Rfl: 2    Allergies:   Allergies   Allergen Reactions    Diamox [Acetazolamide] Diarrhea    Motegrity [Prucalopride] Dizziness     Headache, increased pulse rate, stomach cramps, dizziness and fatigue.    Atorvastatin Myalgia    Penicillins Rash    Pravastatin Myalgia       I have reviewed and updated the patient's chief complaint, history of present illness, review of systems, past medical history, surgical history, family history, social history, medications and allergy list as appropriate.     Objective    Vital Signs:   Vitals:    08/31/23 0917   BP: 116/78   Weight: 96.6 kg (213 lb)   Height: 188.5 cm (74.21\")     Body mass index is 27.19 kg/mý. BMI is >= 25 and <30. (Overweight) The " following options were offered after discussion;: exercise counseling/recommendations and nutrition counseling/recommendations     Patient reports that he is a non-smoker and has not ever been a smoker.  This behavior was applauded and he was encouraged to continue in smoking cessation.  We will continue to monitor at subsequent visits.    Ortho Exam:  right shoulder exam:   Normal shoulder contour without evidence of swelling or ecchymosis. Negative erythema.   Active range of motion is 0ø to 160ø abduction, 0ø to 160ø forward elevation, 80ø of external rotation, and internal rotation to the back.    Motor strength against resisted abduction, forward elevation, external and internal rotation is 5/5.   Tenderness to palpation at the bicipital groove.   Negative tenderness to posterior palpation.   Positive tenderness to lateral palpation.   - tenderness to palpation at the a.c. joint. + pain with crossbody adduction   - Casas sign.   - Creola's test.   negative speeds test.   Negative Yergason's test.   Negative liftoff test.   No anterior or posterior shoulder instability is present.   Negative shoulder apprehension.   full elbow range of motion.   Full sensation to all digits.    Results Review:   Imaging Results (Last 24 Hours)       ** No results found for the last 24 hours. **            Procedures    Assessment / Plan    Assessment/Plan:   Diagnoses and all orders for this visit:    1. Right rotator cuff tendinitis (Primary)    2. Osteoarthritis of right acromioclavicular joint    Other orders  -     - Large Joint Arthrocentesis      Follow-up on right shoulder pain from AC joint osteoarthritis and rotator cuff tendinitis.  Symptoms improve meloxicam and physical therapy.  However, he still has deficit and pain with internal rotation.  Therefore, we discussed escalation of care today to include subacromial corticosteroid injection.  Risk and benefits were discussed and he is elected to proceed.  Tolerated  procedure well.  See procedure note.  I will have him continue with meloxicam as well as finish out physical therapy.  I will see him back in 6 weeks to monitor response.    Follow Up:   Return in about 6 weeks (around 10/12/2023) for Recheck.      Diony Laboy MD  [facility]

## 2023-08-31 NOTE — PROGRESS NOTES
Procedure   - Large Joint Arthrocentesis on 8/31/2023 9:46 AM  Indications: pain  Details: 21 G needle, posterior approach  Medications: 4 mL ropivacaine 0.5 %; 80 mg triamcinolone acetonide 40 MG/ML; 4 mL lidocaine PF 1% 1 %  Outcome: tolerated well, no immediate complications  Procedure, treatment alternatives, risks and benefits explained, specific risks discussed. Consent was given by the patient. Immediately prior to procedure a time out was called to verify the correct patient, procedure, equipment, support staff and site/side marked as required. Patient was prepped and draped in the usual sterile fashion.

## 2023-09-19 DIAGNOSIS — M19.011 OSTEOARTHRITIS OF RIGHT ACROMIOCLAVICULAR JOINT: ICD-10-CM

## 2023-09-19 RX ORDER — MELOXICAM 15 MG/1
TABLET ORAL
Qty: 30 TABLET | Refills: 1 | OUTPATIENT
Start: 2023-09-19

## 2023-10-10 ENCOUNTER — TELEPHONE (OUTPATIENT)
Dept: INTERNAL MEDICINE | Facility: CLINIC | Age: 63
End: 2023-10-10
Payer: COMMERCIAL

## 2023-10-10 DIAGNOSIS — G25.81 RLS (RESTLESS LEGS SYNDROME): Chronic | ICD-10-CM

## 2023-10-10 DIAGNOSIS — E78.00 PURE HYPERCHOLESTEROLEMIA: Chronic | ICD-10-CM

## 2023-10-10 DIAGNOSIS — F41.9 ANXIETY: ICD-10-CM

## 2023-10-10 DIAGNOSIS — E03.9 HYPOTHYROIDISM (ACQUIRED): Chronic | ICD-10-CM

## 2023-10-10 DIAGNOSIS — F51.01 PRIMARY INSOMNIA: Chronic | ICD-10-CM

## 2023-10-10 RX ORDER — ROPINIROLE 0.25 MG/1
TABLET, FILM COATED ORAL
Qty: 90 TABLET | Refills: 1 | Status: SHIPPED | OUTPATIENT
Start: 2023-10-10

## 2023-10-10 RX ORDER — CLONAZEPAM 0.5 MG/1
TABLET ORAL
Qty: 90 TABLET | Refills: 0 | Status: SHIPPED | OUTPATIENT
Start: 2023-10-10

## 2023-10-10 RX ORDER — GABAPENTIN 300 MG/1
CAPSULE ORAL
Qty: 180 CAPSULE | Refills: 0 | Status: SHIPPED | OUTPATIENT
Start: 2023-10-10

## 2023-10-10 RX ORDER — LEVOTHYROXINE SODIUM 0.05 MG/1
50 TABLET ORAL DAILY
Qty: 90 TABLET | Refills: 1 | Status: SHIPPED | OUTPATIENT
Start: 2023-10-10

## 2023-10-10 RX ORDER — ROSUVASTATIN CALCIUM 5 MG/1
5 TABLET, COATED ORAL DAILY
Qty: 90 TABLET | Refills: 1 | Status: SHIPPED | OUTPATIENT
Start: 2023-10-10

## 2023-10-10 RX ORDER — TEMAZEPAM 15 MG/1
15 CAPSULE ORAL NIGHTLY
Qty: 90 CAPSULE | Refills: 0 | Status: SHIPPED | OUTPATIENT
Start: 2023-10-10

## 2023-10-10 NOTE — TELEPHONE ENCOUNTER
LV: 8/30/23   NV: Not scheduled  LF: 8/30/23:  Gabapentin 60/2; clonazepam 30/2/ temazepam 30/2; 7/20/23 ropinirole 0.25 mg 90/3; 5/31/23 levothyroxine 90/1; rosuvastatin 90/1  LL: 3/7/23  CSC: 5/31/23  UDS: I have requested this report.    Does he just need to  the controls at [facility]?  Can you also send in Linzess for him.  He will also need a follow up in November.

## 2023-10-10 NOTE — TELEPHONE ENCOUNTER
----- Message from Tyler Doss sent at 10/7/2023  3:35 PM EDT -----  Regarding: New medicare insurance - change pharmacy  Contact: [phone]  Hello.   I have [employer] PPO medicare insurance as of 10/1/2023.    An image of my new medical card is attached.  I would like to switch all of my medications to the [facility] mail order pharmacy .  Also, I would appreciate it if you would write a prescription for Linzess 290 mcg for chronic constipation.   Thank you!

## 2023-11-10 RX ORDER — AZELASTINE 1 MG/ML
SPRAY, METERED NASAL
Qty: 3 EACH | Refills: 3 | Status: SHIPPED | OUTPATIENT
Start: 2023-11-10

## 2024-03-06 DIAGNOSIS — E03.9 HYPOTHYROIDISM (ACQUIRED): Chronic | ICD-10-CM

## 2024-03-07 RX ORDER — LEVOTHYROXINE SODIUM 0.05 MG/1
50 TABLET ORAL DAILY
Qty: 90 TABLET | Refills: 3 | OUTPATIENT
Start: 2024-03-07

## 2024-07-01 ENCOUNTER — OFFICE VISIT (OUTPATIENT)
Dept: CARDIOLOGY | Facility: CLINIC | Age: 64
End: 2024-07-01
Payer: MEDICARE

## 2024-07-01 ENCOUNTER — SPECIALTY PHARMACY (OUTPATIENT)
Dept: CARDIOLOGY | Facility: CLINIC | Age: 64
End: 2024-07-01
Payer: MEDICARE

## 2024-07-01 ENCOUNTER — PATIENT ROUNDING (BHMG ONLY) (OUTPATIENT)
Dept: CARDIOLOGY | Facility: CLINIC | Age: 64
End: 2024-07-01
Payer: MEDICARE

## 2024-07-01 VITALS
BODY MASS INDEX: 25.61 KG/M2 | HEART RATE: 75 BPM | WEIGHT: 206 LBS | HEIGHT: 75 IN | SYSTOLIC BLOOD PRESSURE: 122 MMHG | OXYGEN SATURATION: 98 % | DIASTOLIC BLOOD PRESSURE: 74 MMHG

## 2024-07-01 DIAGNOSIS — E78.00 PURE HYPERCHOLESTEROLEMIA: Primary | Chronic | ICD-10-CM

## 2024-07-01 RX ORDER — FINASTERIDE 5 MG/1
TABLET, FILM COATED ORAL
COMMUNITY
Start: 2024-05-01

## 2024-07-01 NOTE — PROGRESS NOTES
July 1, 2024    Hello, may I speak with Tyler Doss?    My name is Kenn MARTINEZ      I am  with [facility]  [address]  [address]  [place] [address] [phone].    Before we get started may I verify your date of birth? 1960    I am calling to officially welcome you to our practice and ask about your recent visit. Is this a good time to talk? yes    Tell me about your visit with us. What things went well?  Everything        We're always looking for ways to make our patients' experiences even better. Do you have recommendations on ways we may improve?  no    Overall were you satisfied with your first visit to our practice? yes       I appreciate you taking the time to speak with me today. Is there anything else I can do for you? no      Thank you, and have a great day.

## 2024-07-01 NOTE — PROGRESS NOTES
"[facility] Cardiology  Consultation H&P  Tyler Doss  1960  [phone]  There is no work phone number on file..    VISIT DATE:  07/01/2024    PCP: Lainey Mcghee MD  [address] [facility] [address]  [facility] [address]    CC:  Chief Complaint   Patient presents with    Transient Ischemic Attack       Previous cardiac studies and procedures:  October/November 2021-TIA   CTA head neck and MRI brain unremarkable  TTE  Estimated left ventricular EF = 70% Left ventricular ejection fraction appears to be 66 - 70%. Left ventricular systolic function is normal.  Left ventricular diastolic function was normal.  Saline test results are negative.  Event monitor: Normal    ASSESSMENT:   Diagnosis Plan   1. Pure hypercholesterolemia              PLAN:  Hyperlipidemia: Goal LDL less than 70.  Becoming intolerant to even low-dose statin therapy.  Recommending discontinuation of low-dose statin and starting PCSK9 inhibition.    History of Present Illness   73-year-old gentleman with history of hyperlipidemia and TIA.  Recent worsening baseline lipid profile.  He has been intolerant to multiple statins and is currently experiencing myalgias on rosuvastatin 5 mg 3 times per week.  Maintains an active lifestyle without limitation.  Blood pressures running less than 130/80 mmHg.  Reviewed recent laboratory evaluation.    PHYSICAL EXAMINATION:  Vitals:    07/01/24 0853   BP: 122/74   BP Location: Left arm   Patient Position: Sitting   Pulse: 75   SpO2: 98%   Weight: 93.4 kg (206 lb)   Height: 190.5 cm (75\")     General Appearance:    Alert, cooperative, no distress, appears stated age   Head:    Normocephalic, without obvious abnormality, atraumatic   Eyes:    conjunctiva/corneas clear, EOM's intact, fundi     benign, both eyes   Ears:    Normal TM's and external ear canals, both ears   Nose:   Nares normal, septum midline, mucosa normal, no drainage    or sinus tenderness   Throat:   Lips, " mucosa, and tongue normal; teeth and gums normal   Neck:   Supple, symmetrical, trachea midline, no adenopathy;     thyroid:  no enlargement/tenderness/nodules; no carotid    bruit or JVD   Back:     Symmetric, no curvature, ROM normal, no CVA tenderness   Lungs:     Clear to auscultation bilaterally, respirations unlabored   Chest Wall:    No tenderness or deformity    Heart:    Regular rate and rhythm, S1 and S2 normal, no murmur, rub   or gallop, normal carotid impulse bilaterally without bruit.   Abdomen:     Soft, non-tender, bowel sounds active all four quadrants,     no masses, no organomegaly   Extremities:   Extremities normal, atraumatic, no cyanosis or edema   Pulses:   2+ and symmetric all extremities   Skin:   Skin color, texture, turgor normal, no rashes or lesions   Lymph nodes:   Cervical, supraclavicular, and axillary nodes normal   Neurologic:   normal strength, sensation intact     throughout       Diagnostic Data:    ECG 12 Lead    Date/Time: 7/1/2024 9:12 AM  Performed by: Matt Cantrell III, MD    Authorized by: Matt Cantrell III, MD  Comparison: compared with previous ECG from 12/17/2019  Similar to previous ECG  Rhythm: sinus rhythm  Other findings: non-specific ST-T wave changes    Clinical impression: abnormal EKG        Lab Results   Component Value Date    TRIG 118 03/07/2023    HDL 41 03/07/2023     Lab Results   Component Value Date    GLUCOSE 83 03/07/2023    BUN 11 03/07/2023    CREATININE 0.89 03/07/2023     (L) 03/07/2023    K 4.3 03/07/2023     03/07/2023    CO2 25.0 03/07/2023     Lab Results   Component Value Date    HGBA1C 5.30 10/18/2021     Lab Results   Component Value Date    WBC 5.54 03/07/2023    HGB 15.5 03/07/2023    HCT 46.0 03/07/2023     03/07/2023       PROBLEM LIST:  Patient Active Problem List   Diagnosis    Pure hypercholesterolemia    Primary insomnia    Anxiety    Esophageal reflux    Depressive disorder    Attention deficit hyperactivity  disorder    Allergic rhinitis    Dysfunction of eustachian tube    Obstructive sleep apnea, adult    Overweight    Leg cramps    Hypothyroidism (acquired)    Chronic constipation    RLS (restless legs syndrome)    Arachnoid cyst    TIA (transient ischemic attack)       PAST MEDICAL HX  Past Medical History:   Diagnosis Date    Allergic     Anxiety     Chronic constipation     neg celiac panel, including ttg IgG    Colon polyp 2020    Constipation     Depression     Diverticulosis 2020    by colon    Eye exam, routine 2011    GERD (gastroesophageal reflux disease)     H/O cardiovascular stress test 10/2015    neg    H/O colonoscopy 08/2015     dilated and slightly tortuos colon    H/O echocardiogram 10/2015    neg    H/O x-ray of lumbar spine 02/2015    hypertrophic facet changes l3-s1, significant     History of dental examination 06/2012    Hyperlipidemia     IgA deficiency     mild    Prostatitis     Retinal tear of left eye 06/2018    Rotator cuff syndrome 4/6/2023    pain in shoulder when reaching backward or extending forward    Screening PSA (prostate specific antigen) 11/2015    0.3    Seasonal allergies        Allergies  Allergies   Allergen Reactions    Diamox [Acetazolamide] Diarrhea    Motegrity [Prucalopride] Dizziness     Headache, increased pulse rate, stomach cramps, dizziness and fatigue.    Atorvastatin Myalgia    Penicillins Rash    Pravastatin Myalgia       Current Medications    Current Outpatient Medications:     aspirin 81 MG chewable tablet, Chew 1 tablet Daily., Disp: 30 tablet, Rfl: 0    azelastine (Astelin) 0.1 % nasal spray, 2 sprays each mostril bid, Disp: 3 each, Rfl: 3    clonazePAM (KlonoPIN) 0.5 MG tablet, 1 qd prn anxiety, Disp: 90 tablet, Rfl: 0    finasteride (PROSCAR) 5 MG tablet, , Disp: , Rfl:     fluticasone (FLONASE) 50 MCG/ACT nasal spray, 2 sprays into the nostril(s) as directed by provider Daily., Disp: , Rfl:     gabapentin (NEURONTIN) 300 MG capsule, TAKE 2 CAPSULES BY  MOUTH  EVERY NIGHT AT BEDTIME, Disp: 180 capsule, Rfl: 0    levothyroxine (Synthroid) 50 MCG tablet, Take 1 tablet by mouth Daily., Disp: 90 tablet, Rfl: 1    linaclotide (Linzess) 290 MCG capsule capsule, Take 1 capsule by mouth Every Morning Before Breakfast., Disp: 90 capsule, Rfl: 1    omeprazole (priLOSEC) 20 MG capsule, Take 1 capsule by mouth Daily., Disp: , Rfl:     rOPINIRole (REQUIP) 0.25 MG tablet, Take 1 hour before bedtime.1 qhs, Disp: 90 tablet, Rfl: 1    sildenafil (VIAGRA) 50 MG tablet, Take 1 tablet by mouth Daily As Needed for Erectile Dysfunction., Disp: 30 tablet, Rfl: 5    Plecanatide (Trulance) 3 MG tablet, Take 1 tablet by mouth Daily As Needed., Disp: , Rfl:     temazepam (RESTORIL) 15 MG capsule, Take 1 capsule by mouth Every Night., Disp: 90 capsule, Rfl: 0         ROS  ROS      SOCIAL HX  Social History     Socioeconomic History    Marital status:    Tobacco Use    Smoking status: Never     Passive exposure: Past    Smokeless tobacco: Never   Vaping Use    Vaping status: Never Used   Substance and Sexual Activity    Alcohol use: Not Currently     Comment: weekly    Drug use: No    Sexual activity: Yes     Partners: Female       FAMILY HX  Family History   Problem Relation Age of Onset    Ovarian cancer Mother 59        thyroid/goiter    Thyroid disease Mother     Heart attack Father 66        crohn's disease    Crohn's disease Father     Diabetes Sister     Hypothyroidism Sister     Stroke Sister 51        ? Stroke shown on MRI    Hyperlipidemia Sister     Cancer Sister     Atrial fibrillation Sister     Thyroid disease Brother     Hyperlipidemia Brother     Atrial fibrillation Brother     Colon polyps Brother     Parkinsonism Brother         diagnosed at the age of 73    Uterine cancer Paternal Grandmother 40    Thyroid disease Paternal Grandmother     Stroke Paternal Grandfather     Immunodeficiency Son         IgA deficiency    Heart attack Paternal Uncle         in 70's    Heart  disease Other     Depression Other     Prostate cancer Other     Ovarian cancer Other              Matt Cantrell III, MD, FACC

## 2024-07-02 NOTE — PROGRESS NOTES
Specialty Pharmacy Patient Management Program  Cardiology Initial Assessment     Tyler Doss was referred by a Cardiology provider to the Cardiology Patient Management program offered by [facility] for Hyperlipidemia on 07/02/24.  An initial outreach was conducted, including assessment of therapy appropriateness and specialty medication education for Repatha. The patient was introduced to services offered by [facility], including: regular assessments, refill coordination, mail order delivery options, prior authorization maintenance, and financial assistance programs as applicable. The patient was also provided with contact information for the pharmacy team.     Insurance Coverage & Financial Support  [employer] Part D     Relevant Past Medical History and Comorbidities  Relevant medical history and concomitant health conditions were discussed with the patient. The patient's chart has been reviewed for relevant past medical history and comorbid conditions and updated as necessary.  Past Medical History:   Diagnosis Date    Allergic     Anxiety     Chronic constipation     neg celiac panel, including ttg IgG    Colon polyp 2020    Constipation     Depression     Diverticulosis 2020    by colon    Eye exam, routine 2011    GERD (gastroesophageal reflux disease)     H/O cardiovascular stress test 10/2015    neg    H/O colonoscopy 08/2015     dilated and slightly tortuos colon    H/O echocardiogram 10/2015    neg    H/O x-ray of lumbar spine 02/2015    hypertrophic facet changes l3-s1, significant     History of dental examination 06/2012    Hyperlipidemia     IgA deficiency     mild    Prostatitis     Retinal tear of left eye 06/2018    Rotator cuff syndrome 4/6/2023    pain in shoulder when reaching backward or extending forward    Screening PSA (prostate specific antigen) 11/2015    0.3    Seasonal allergies      Social History     Socioeconomic History    Marital  status:    Tobacco Use    Smoking status: Never     Passive exposure: Past    Smokeless tobacco: Never   Vaping Use    Vaping status: Never Used   Substance and Sexual Activity    Alcohol use: Not Currently     Comment: weekly    Drug use: No    Sexual activity: Yes     Partners: Female       Problem list reviewed by Keiry Brush, PharmD on 7/2/2024 at  9:33 AM    Allergies  Known allergies and reactions were discussed with the patient. The patient's chart has been reviewed for  allergy information and updated as necessary.   Allergies   Allergen Reactions    Diamox [Acetazolamide] Diarrhea    Motegrity [Prucalopride] Dizziness     Headache, increased pulse rate, stomach cramps, dizziness and fatigue.    Atorvastatin Myalgia    Penicillins Rash    Pravastatin Myalgia       Allergies reviewed by Keiry Brush, PharmD on 7/2/2024 at  9:33 AM    Relevant Laboratory Values  Relevant laboratory values were discussed with the patient. The following specialty medication dose adjustment(s) are recommended: Begin Repatha 140mg subcutaneously every 14 days    Lab Results   Component Value Date    GLUCOSE 83 03/07/2023    CALCIUM 9.6 03/07/2023     (L) 03/07/2023    K 4.3 03/07/2023    CO2 25.0 03/07/2023     03/07/2023    BUN 11 03/07/2023    CREATININE 0.89 03/07/2023    EGFRIFNONA 74 02/21/2022    BCR 12.4 03/07/2023    ANIONGAP 10.0 03/07/2023     Lab Results   Component Value Date    CHOL 153 03/07/2023    TRIG 118 03/07/2023    HDL 41 03/07/2023    LDL 91 03/07/2023         Current Medication List  This medication list has been reviewed with the patient and evaluated for any interactions or necessary modifications/recommendations, and updated to include all prescription medications, OTC medications, and supplements the patient is currently taking.  This list reflects what is contained in the patient's profile, which has also been marked as reviewed to communicate to other providers it is the  most up to date version of the patient's current medication therapy.     Current Outpatient Medications:     aspirin 81 MG chewable tablet, Chew 1 tablet Daily., Disp: 30 tablet, Rfl: 0    azelastine (Astelin) 0.1 % nasal spray, 2 sprays each mostril bid, Disp: 3 each, Rfl: 3    clonazePAM (KlonoPIN) 0.5 MG tablet, 1 qd prn anxiety, Disp: 90 tablet, Rfl: 0    Evolocumab (REPATHA) solution auto-injector SureClick injection, Inject 1 mL under the skin into the appropriate area as directed Every 14 (Fourteen) Days., Disp: 2 mL, Rfl: 11    finasteride (PROSCAR) 5 MG tablet, , Disp: , Rfl:     fluticasone (FLONASE) 50 MCG/ACT nasal spray, 2 sprays into the nostril(s) as directed by provider Daily., Disp: , Rfl:     gabapentin (NEURONTIN) 300 MG capsule, TAKE 2 CAPSULES BY MOUTH  EVERY NIGHT AT BEDTIME, Disp: 180 capsule, Rfl: 0    levothyroxine (Synthroid) 50 MCG tablet, Take 1 tablet by mouth Daily., Disp: 90 tablet, Rfl: 1    linaclotide (Linzess) 290 MCG capsule capsule, Take 1 capsule by mouth Every Morning Before Breakfast., Disp: 90 capsule, Rfl: 1    omeprazole (priLOSEC) 20 MG capsule, Take 1 capsule by mouth Daily., Disp: , Rfl:     Plecanatide (Trulance) 3 MG tablet, Take 1 tablet by mouth Daily As Needed., Disp: , Rfl:     rOPINIRole (REQUIP) 0.25 MG tablet, Take 1 hour before bedtime.1 qhs, Disp: 90 tablet, Rfl: 1    sildenafil (VIAGRA) 50 MG tablet, Take 1 tablet by mouth Daily As Needed for Erectile Dysfunction., Disp: 30 tablet, Rfl: 5    temazepam (RESTORIL) 15 MG capsule, Take 1 capsule by mouth Every Night., Disp: 90 capsule, Rfl: 0    Medicines reviewed by Keiry Brush, PharmD on 7/2/2024 at  9:33 AM    Drug Interactions  None currently  Recommended Medications Assessment  Aspirin: Currently Taking   Statin: Contraindicated  ACEi/ARB: Not Indicated    Initial Education Provided for Specialty Medication  The patient has been provided with the following education and any applicable administration  techniques (i.e. self-injection) have been demonstrated for the therapies indicated. All questions and concerns have been addressed prior to the patient receiving the medication, and the patient has verbalized comprehension of the education and any materials provided. Additional patient education shall be provided and documented upon request by the patient, provider, or payer.    REPATHA® (evolocumab)  Medication Expectations   Why am I taking this medication? You are taking Repatha to lower your “bad” cholesterol (LDL-C). This medication can be used in adults with high blood cholesterol including primary hyperlipidemia and familial hypercholesterolemia.    What should I expect while on this medication? You should expect to see your cholesterol improve over time. Specifically, you should see your LDL-C decrease.    How does the medication work? Repatha works by blocking a protein called PCSK9 that contributes to high levels of bad cholesterol. It helps increase your liver's ability to remove bad cholesterol from your blood.     How long will I be on this medication for? The amount of time you will be on this medication will be determined by your doctor based on your cholesterol and/or your risk of having a cardiac event. You will most likely be on this medication or another cholesterol medication throughout your lifetime. Do not abruptly stop this medication without talking to your doctor first.    How do I take this medication? Take as directed on your prescription label. Repatha is injected under the skin (subcutaneously) of your stomach, thigh, or upper arm. This medication is usually given one or twice a month.   What are some possible side effects? The most common side effects of Repatha include redness, itching, swelling, or pain/tenderness at the injection site, symptoms of the common cold, flu or flu-like symptoms or back pain.    What happens if I miss a dose? If you miss a dose, take it as soon as you  remember if it is within 7 days from the usual day of administration then resume your original schedule. If it is beyond 7 days and you use the kennedy-2-week dose, skip the missed dose and resume your normal dosing schedule.If it is beyond 7 days and you use the once-monthly dose, inject the dose and start a new schedule based on that date.      Medication Safety   What are things I should warn my doctor immediately about? Talk to your doctor if you are pregnant, planning to become pregnant, or breastfeeding. Stop the medication and tell your doctor or seek emergency medical help if you notice any signs/symptoms of an allergic reaction (severe rash, redness, hives, severe itching, trouble breathing, or swelling of the face, lips, or tongue). If you have a rubber or latex allergy, you should not use the Repatha SureClick® Autoinjector pen or the prefilled syringe, please notify your doctor or pharmacist.   What are things that I should be cautious of? Be cautious of any side effects from this medication. Talk to your doctor if any new ones develop or aren't getting better.   What are some medications that can interact with this one? There are no known significant drug interactions with Repatha. Always tell your doctor or pharmacist immediately if you start taking any new medications, including over-the-counter medications, vitamins, and herbal supplements.      Medication Storage/Handling   How should I handle this medication? Do not shake or expose the pens, cartridges, or syringes to extreme heat or direct sunlight. Keep this medication out of reach of pets/children. Allow medication to warm at room temperature prior to administration.   How does this medication need to be stored? Store unused pens, cartridges, or syringes in the refrigerator in the original cartons to protect from light. If needed, Repatha may be kept at room temperature in the original carton for up to 30 days. Do not freeze.    How should I  dispose of this medication? All the Repatha devices are single-dose and should be discarded in a sharps container after use. If your doctor decides to stop this medication, take to your local police station for proper disposal. Some pharmacies also have take-back bins for medication drop-off.      Resources/Support   How can I remind myself to take this medication? You can download reminder apps to help you manage your refills. You may also set an alarm on your phone to remind you to take your dose.    Is financial support available?  [employer] can provide co-pay cards if you have commercial insurance or patient assistance if you have Medicare or no insurance.    Which vaccines are recommended for me? Talk to your doctor about these vaccines: Flu, Coronavirus (COVID-19), Pneumococcal (pneumonia), Tdap, Hepatitis B, Zoster (shingles)          Adherence and Self-Administration  Adherence related to the patient's specialty therapy was discussed with the patient. The Adherence segment of this outreach has been reviewed and updated.     Is there a concern with patient's ability to self administer the medication correctly and without issue?: No     Are there any concerns regarding the patient's understanding of the importance of medication adherence?: No  Methods for Supporting Patient Adherence and/or Self-Administration: None    Open Medication Therapy Problems  No medication therapy recommendations to display    Goals of Therapy  Goals related to the patient's specialty therapy were discussed with the patient. The Patient Goals segment of this outreach has been reviewed and updated.   Goals Addressed Today        Specialty Pharmacy General Goal      LDL Goal < 70 mg/dL    Lab Results   Component Value Date    LDL 91 03/07/2023    LDL 65 02/21/2022     (H) 10/18/2021     (H) 02/05/2021     (H) 12/26/2019                  Reassessment Plan & Follow-Up  1. Medication Therapy Changes: Begin Repatha 140mg  subcutaneously every 14 days  2. Related Plans, Therapy Recommendations, or Therapy Problems to Be Addressed: None  3. Pharmacist to perform regular assessments no more than (6) months from the previous assessment.  4. Care Coordinator to set up future refill outreaches, coordinate prescription delivery, and escalate clinical questions to pharmacist.  5. Welcome information and patient satisfaction survey to be sent by specialty pharmacy team with patient's initial fill.    Attestation  Therapeutic appropriateness: Appropriate   I attest the patient was actively involved in and has agreed to the above plan of care. If the prescribed therapy is at any point deemed not appropriate based on the current or future assessments, a consultation will be initiated with the patient's specialty care provider to determine the best course of action. The revised plan of therapy will be documented along with any required assessments and/or additional patient education provided.     Keiry Brush, PharmD, BCPS  Clinical Specialty Pharmacist, Cardiology  7/2/2024  09:36 EDT

## 2024-07-25 ENCOUNTER — SPECIALTY PHARMACY (OUTPATIENT)
Dept: CARDIOLOGY | Facility: CLINIC | Age: 64
End: 2024-07-25
Payer: MEDICARE

## 2024-07-25 NOTE — PROGRESS NOTES
Specialty Pharmacy Refill Coordination Note     Tyler is a 63 y.o. male contacted today regarding refills of Repatha 140 mg SC every 14 days specialty medication(s).    Shipping to home address on 7/29 for delivery 7/30. Patient aware.    Specialty medication(s) and dose(s) confirmed: yes    Refill Questions      Flowsheet Row Most Recent Value   Changes to allergies? No   Changes to medications? No   New conditions or infections since last clinic visit No   Unplanned office visit, urgent care, ED, or hospital admission in the last 4 weeks  No   How does patient/caregiver feel medication is working? Good   Financial problems or insurance changes  No   Since the previous refill, were any specialty medication doses or scheduled injections missed or delayed?  No   Does this patient require a clinical escalation to a pharmacist? No            Delivery Questions      Flowsheet Row Most Recent Value   Delivery method [employer]   Delivery address verified with patient/caregiver? Yes   Delivery address Home   Number of medications in delivery 1   Medication(s) being filled and delivered Evolocumab   Doses left of specialty medications 1 pen due on 8/1   Copay verified? Yes   Copay amount $47   Copay form of payment Credit/debit on file   Ship Date 7/29/24   Delivery Date 7/30/24   Signature Required No                   Follow-up: 28 day(s)     Evy Garcia, Pharmacy Technician  Specialty Pharmacy Technician

## 2024-08-16 ENCOUNTER — SPECIALTY PHARMACY (OUTPATIENT)
Dept: CARDIOLOGY | Facility: CLINIC | Age: 64
End: 2024-08-16
Payer: MEDICARE

## 2024-08-16 NOTE — PROGRESS NOTES
Specialty Pharmacy Refill Coordination Note     Tyler is a 63 y.o. male contacted today regarding refills of Repatha 140 mg SC every 14 days specialty medication(s).    Shipping Monday for delivery on Tuesday, 8/20/24. Patient is in coverage gap of Medicare plan. He is aware of price increase from last month. He plans on calling his insurance plan to discuss further. Will attempt grants/funding if needed. Follow up at next refill.    Specialty medication(s) and dose(s) confirmed: yes    Refill Questions      Flowsheet Row Most Recent Value   Changes to allergies? No   Changes to medications? No   New conditions or infections since last clinic visit No   Unplanned office visit, urgent care, ED, or hospital admission in the last 4 weeks  No   How does patient/caregiver feel medication is working? Very good   Financial problems or insurance changes  Yes   If yes, describe changes in insurance or financial issues. patient is in coverage gap in Medicare plan- he is going to call to see what his total deductible amount is   Since the previous refill, were any specialty medication doses or scheduled injections missed or delayed?  No   Does this patient require a clinical escalation to a pharmacist? No            Delivery Questions      Flowsheet Row Most Recent Value   Delivery method [employer]   Delivery address verified with patient/caregiver? Yes   Delivery address Home   Number of medications in delivery 1   Medication(s) being filled and delivered Evolocumab   Doses left of specialty medications 1 pen   Copay verified? Yes   Copay amount $83.38   Copay form of payment Credit/debit on file   Ship Date 8/19/24   Delivery Date 8/20/24   Signature Required No                   Follow-up: 28 day(s)     Evy Garcia, Pharmacy Technician  Specialty Pharmacy Technician

## 2024-09-16 ENCOUNTER — SPECIALTY PHARMACY (OUTPATIENT)
Dept: CARDIOLOGY | Facility: CLINIC | Age: 64
End: 2024-09-16
Payer: MEDICARE

## 2024-09-20 ENCOUNTER — PATIENT MESSAGE (OUTPATIENT)
Age: 64
End: 2024-09-20
Payer: MEDICARE

## 2024-09-20 DIAGNOSIS — F41.9 ANXIETY: ICD-10-CM

## 2024-09-20 DIAGNOSIS — G25.81 RLS (RESTLESS LEGS SYNDROME): Chronic | ICD-10-CM

## 2024-09-20 RX ORDER — CLONAZEPAM 0.5 MG/1
TABLET ORAL
Qty: 30 TABLET | OUTPATIENT
Start: 2024-09-20

## 2024-09-20 RX ORDER — ROPINIROLE 0.25 MG/1
TABLET, FILM COATED ORAL
Qty: 90 TABLET | Refills: 3 | OUTPATIENT
Start: 2024-09-20

## 2024-10-09 ENCOUNTER — SPECIALTY PHARMACY (OUTPATIENT)
Dept: CARDIOLOGY | Facility: CLINIC | Age: 64
End: 2024-10-09
Payer: MEDICARE

## 2024-10-11 NOTE — PROGRESS NOTES
Specialty Pharmacy Refill Coordination Note     Tyler is a 63 y.o. male contacted today regarding refills of Repatha 140 mg SC every 14 days specialty medication(s).    Delivery scheduled for Wednesday, 10/16/24. Patientw ill be out of town until then.    Specialty medication(s) and dose(s) confirmed: yes    Refill Questions      Flowsheet Row Most Recent Value   Changes to allergies? No   Changes to medications? No   New conditions or infections since last clinic visit No   Unplanned office visit, urgent care, ED, or hospital admission in the last 4 weeks  No   How does patient/caregiver feel medication is working? Good   Financial problems or insurance changes  No   If yes, describe changes in insurance or financial issues. n/a   Since the previous refill, were any specialty medication doses or scheduled injections missed or delayed?  No   Does this patient require a clinical escalation to a pharmacist? No            Delivery Questions      Flowsheet Row Most Recent Value   Delivery method UPS   Delivery address verified with patient/caregiver? Yes   Delivery address Home   Number of medications in delivery 1   Medication(s) being filled and delivered Evolocumab   Doses left of specialty medications 0 pen   Copay verified? Yes   Copay amount 132.03   Copay form of payment Credit/debit on file   Ship Date 10/15/2024   Delivery Date 10/16/2024   Signature Required No                   Follow-up: 28 day(s)     Evy Garcia, Pharmacy Technician  Specialty Pharmacy Technician

## 2024-10-28 ENCOUNTER — TELEPHONE (OUTPATIENT)
Dept: CARDIOLOGY | Facility: CLINIC | Age: 64
End: 2024-10-28
Payer: MEDICARE

## 2024-10-28 DIAGNOSIS — E78.00 PURE HYPERCHOLESTEROLEMIA: Primary | ICD-10-CM

## 2024-11-11 ENCOUNTER — SPECIALTY PHARMACY (OUTPATIENT)
Dept: CARDIOLOGY | Facility: CLINIC | Age: 64
End: 2024-11-11
Payer: MEDICARE

## 2024-11-11 NOTE — PROGRESS NOTES
Specialty Pharmacy Refill Coordination Note     Tyler is a 64 y.o. male contacted today regarding refills of Repatha 140 mg SC every 14 days specialty medication(s).    Delivery scheduled for tomorrow, 11/12/2024.    Specialty medication(s) and dose(s) confirmed: yes    Refill Questions      Flowsheet Row Most Recent Value   Changes to allergies? No   Changes to medications? No   New conditions or infections since last clinic visit No   Unplanned office visit, urgent care, ED, or hospital admission in the last 4 weeks  No   How does patient/caregiver feel medication is working? Very good   Financial problems or insurance changes  No   If yes, describe changes in insurance or financial issues. Patient previously voiced concerns over high copayment- was able to obtain chandu now $0   Since the previous refill, were any specialty medication doses or scheduled injections missed or delayed?  No   Does this patient require a clinical escalation to a pharmacist? No            Delivery Questions      Flowsheet Row Most Recent Value   Delivery method UPS   Delivery address verified with patient/caregiver? Yes   Delivery address Home   Number of medications in delivery 1   Medication(s) being filled and delivered Evolocumab (REPATHA)   Doses left of specialty medications 0   Copay verified? Yes   Copay amount 0.00   Copay form of payment No copayment ($0)   Ship Date 11/11/24   Delivery Date 11/12/24   Signature Required No                   Follow-up: 28 day(s)     Evy Garcia, Pharmacy Technician  Specialty Pharmacy Technician

## 2024-12-06 ENCOUNTER — SPECIALTY PHARMACY (OUTPATIENT)
Dept: CARDIOLOGY | Facility: CLINIC | Age: 64
End: 2024-12-06
Payer: MEDICARE

## 2024-12-06 NOTE — PROGRESS NOTES
Specialty Pharmacy Refill Coordination Note     Tyler is a 64 y.o. male contacted today regarding refills of Repatha 140 mg SC every 14 days specialty medication(s).    Delivery scheduled for Tuesday, 12/10.    Specialty medication(s) and dose(s) confirmed: yes    Refill Questions      Flowsheet Row Most Recent Value   Changes to allergies? No   Changes to medications? No   New conditions or infections since last clinic visit No   Unplanned office visit, urgent care, ED, or hospital admission in the last 4 weeks  No   How does patient/caregiver feel medication is working? Very good   Financial problems or insurance changes  No   If yes, describe changes in insurance or financial issues. n/a   Since the previous refill, were any specialty medication doses or scheduled injections missed or delayed?  No   Does this patient require a clinical escalation to a pharmacist? No            Delivery Questions      Flowsheet Row Most Recent Value   Delivery method UPS   Delivery address verified with patient/caregiver? Yes   Delivery address Home   Number of medications in delivery 1   Medication(s) being filled and delivered Evolocumab (REPATHA)   Doses left of specialty medications 0   Copay verified? Yes   Copay amount 0.00   Copay form of payment No copayment ($0)   Ship Date 12/9/24   Delivery Date 12/10/24   Signature Required No                   Follow-up: 28 day(s)     Evy Garcia, Pharmacy Technician  Specialty Pharmacy Technician

## 2024-12-20 ENCOUNTER — SPECIALTY PHARMACY (OUTPATIENT)
Dept: CARDIOLOGY | Facility: CLINIC | Age: 64
End: 2024-12-20
Payer: MEDICARE

## 2024-12-20 NOTE — PROGRESS NOTES
Specialty Pharmacy Patient Management Program  Cardiology Reassessment     Tyler Doss was referred by a Cardiology provider to the Cardiology Patient Management program offered by [facility] for Hyperlipidemia. A follow-up outreach was conducted, including assessment of continued therapy appropriateness, medication adherence, and side effect incidence and management for Repatha.    Changes to Insurance Coverage or Financial Support  No changes    Relevant Past Medical History and Comorbidities  Relevant medical history and concomitant health conditions were discussed with the patient. The patient's chart has been reviewed for relevant past medical history and comorbid health conditions and updated as necessary.   Past Medical History:   Diagnosis Date    Allergic     Anxiety     Chronic constipation     neg celiac panel, including ttg IgG    Colon polyp 2020    Constipation     Depression     Diverticulosis 2020    by colon    Eye exam, routine 2011    GERD (gastroesophageal reflux disease)     H/O cardiovascular stress test 10/2015    neg    H/O colonoscopy 08/2015     dilated and slightly tortuos colon    H/O echocardiogram 10/2015    neg    H/O x-ray of lumbar spine 02/2015    hypertrophic facet changes l3-s1, significant     History of dental examination 06/2012    Hyperlipidemia     IgA deficiency     mild    Prostatitis     Retinal tear of left eye 06/2018    Rotator cuff syndrome 4/6/2023    pain in shoulder when reaching backward or extending forward    Screening PSA (prostate specific antigen) 11/2015    0.3    Seasonal allergies      Social History     Socioeconomic History    Marital status:    Tobacco Use    Smoking status: Never     Passive exposure: Past    Smokeless tobacco: Never   Vaping Use    Vaping status: Never Used   Substance and Sexual Activity    Alcohol use: Not Currently     Comment: weekly    Drug use: No    Sexual activity: Yes     Partners:  Female     Problem list reviewed by Keiry Brush, PharmD on 12/20/2024 at 10:28 AM    Hospitalizations and Urgent Care Since Last Assessment  ED Visits, Admissions, or Hospitalizations: None  Urgent Office Visits: None    Allergies  Known allergies and reactions were discussed with the patient. The patient's chart has been reviewed for allergy information and updated as necessary.   Allergies   Allergen Reactions    Diamox [Acetazolamide] Diarrhea    Motegrity [Prucalopride] Dizziness     Headache, increased pulse rate, stomach cramps, dizziness and fatigue.    Atorvastatin Myalgia    Penicillins Rash    Pravastatin Myalgia     Allergies reviewed by Keiry Brush, PharmD on 12/20/2024 at 10:28 AM    Relevant Laboratory Values  Relevant laboratory values were discussed with the patient. The following specialty medication dose adjustment(s) are recommended: No changes; attempting to locate most recent lipid panel from PCP office    Lab Results   Component Value Date    GLUCOSE 83 03/07/2023    CALCIUM 9.6 03/07/2023     (L) 03/07/2023    K 4.3 03/07/2023    CO2 25.0 03/07/2023     03/07/2023    BUN 11 03/07/2023    CREATININE 0.89 03/07/2023    EGFRIFNONA 74 02/21/2022    BCR 12.4 03/07/2023    ANIONGAP 10.0 03/07/2023     Lab Results   Component Value Date    CHOL 153 03/07/2023    TRIG 118 03/07/2023    HDL 41 03/07/2023    LDL 91 03/07/2023       Current Medication List  This medication list has been reviewed with the patient and evaluated for any interactions or necessary modifications/recommendations, and updated to include all prescription medications, OTC medications, and supplements the patient is currently taking.  This list reflects what is contained in the patient's profile, which has also been marked as reviewed to communicate to other providers it is the most up to date version of the patient's current medication therapy.     Current Outpatient Medications:     aspirin 81 MG chewable  tablet, Chew 1 tablet Daily., Disp: 30 tablet, Rfl: 0    azelastine (Astelin) 0.1 % nasal spray, 2 sprays each mostril bid, Disp: 3 each, Rfl: 3    clonazePAM (KlonoPIN) 0.5 MG tablet, 1 qd prn anxiety, Disp: 90 tablet, Rfl: 0    Evolocumab (REPATHA) solution auto-injector SureClick injection, Inject 1 mL under the skin into the appropriate area as directed Every 14 (Fourteen) Days., Disp: 2 mL, Rfl: 11    finasteride (PROSCAR) 5 MG tablet, , Disp: , Rfl:     fluticasone (FLONASE) 50 MCG/ACT nasal spray, 2 sprays into the nostril(s) as directed by provider Daily., Disp: , Rfl:     gabapentin (NEURONTIN) 300 MG capsule, TAKE 2 CAPSULES BY MOUTH  EVERY NIGHT AT BEDTIME, Disp: 180 capsule, Rfl: 0    levothyroxine (Synthroid) 50 MCG tablet, Take 1 tablet by mouth Daily., Disp: 90 tablet, Rfl: 1    linaclotide (Linzess) 290 MCG capsule capsule, Take 1 capsule by mouth Every Morning Before Breakfast., Disp: 90 capsule, Rfl: 1    omeprazole (priLOSEC) 20 MG capsule, Take 1 capsule by mouth Daily., Disp: , Rfl:     rOPINIRole (REQUIP) 0.25 MG tablet, Take 1 hour before bedtime.1 qhs, Disp: 90 tablet, Rfl: 1    sildenafil (VIAGRA) 50 MG tablet, Take 1 tablet by mouth Daily As Needed for Erectile Dysfunction., Disp: 30 tablet, Rfl: 5    Medicines reviewed by Keiry Brush, PharmD on 12/20/2024 at 10:28 AM    Drug Interactions  None    Adverse Drug Reactions  Medication tolerability: Tolerating with no to minimal ADRs  Medication plan: Continue therapy with normal follow-up  Plan for ADR Management: N/A    Adherence, Self-Administration, and Current Therapy Problems  Adherence related to the patient's specialty therapy was discussed with the patient. The Adherence segment of this outreach has been reviewed and updated.     Adherence Questions  Linked Medication(s) Assessed: Evolocumab (REPATHA)  On average, how many doses/injections does the patient miss per month?: 0  What are the identified reasons for non-adherence or  missed doses? : no problems identified  What is the estimated medication adherence level?: %  Based on the patient/caregiver response and refill history, does this patient require an MTP to track adherence improvements?: no    Additional Barriers to Patient Self-Administration: N/A  Methods for Supporting Patient Self-Administration: N/A    Open Medication Therapy Problems  No medication therapy recommendations to display    Goals of Therapy  Goals related to the patient's specialty therapy were discussed with the patient. The Patient Goals segment of this outreach has been reviewed and updated.   Goals Addressed Today        Specialty Pharmacy General Goal      LDL Goal < 70 mg/dL    Lab Results   Component Value Date    LDL 91 03/07/2023    LDL 65 02/21/2022     (H) 10/18/2021     (H) 02/05/2021     (H) 12/26/2019 12/20/24: Patient had repeat LDL drawn at PCP; attempting to locate results. He verbalized improvement in LDL- continue Repatha and will locate recent labs               Quality of Life Assessment   Quality of Life related to the patient's enrollment in the patient management program and services provided was discussed with the patient. The QOL segment of this outreach has been reviewed and updated.  Quality of Life Improvement Scale: 8-Moderately better    Reassessment Plan & Follow-Up  1. Medication Therapy Changes: Continue Repatha 140mg subcutaneous every 14 days   2. Related Plans, Therapy Recommendations, or Issues to Be Addressed: None  3. Pharmacist to perform regular assessments no more than (6) months from the previous assessment.  4. Care Coordinator to set up future refill outreaches, coordinate prescription delivery, and escalate clinical questions to pharmacist.    Attestation  Therapeutic appropriateness: Appropriate   I attest the patient was actively involved in and has agreed to the above plan of care.  If the prescribed therapy is at any point deemed not  appropriate based on the current or future assessments, a consultation will be initiated with the patient's specialty care provider to determine the best course of action. The revised plan of therapy will be documented along with any required assessments and/or additional patient education provided.     Keiry Brush, PharmD, [facility]  Clinical Specialty Pharmacist, Cardiology  12/20/2024  10:30 EST

## 2024-12-31 ENCOUNTER — SPECIALTY PHARMACY (OUTPATIENT)
Dept: CARDIOLOGY | Facility: CLINIC | Age: 64
End: 2024-12-31
Payer: MEDICARE

## 2024-12-31 NOTE — PROGRESS NOTES
Specialty Pharmacy Refill Coordination Note     Tyler is a 64 y.o. male contacted today regarding refills of Repatha 140 mg SC every 14 days specialty medication(s).    Delivery scheduled for Friday, 1/3/25.    Specialty medication(s) and dose(s) confirmed: yes    Refill Questions      Flowsheet Row Most Recent Value   Changes to allergies? No   Changes to medications? No   New conditions or infections since last clinic visit No   Unplanned office visit, urgent care, ED, or hospital admission in the last 4 weeks  No   How does patient/caregiver feel medication is working? Very good   Financial problems or insurance changes  No   If yes, describe changes in insurance or financial issues. n/a   Since the previous refill, were any specialty medication doses or scheduled injections missed or delayed?  No   Does this patient require a clinical escalation to a pharmacist? No            Delivery Questions      Flowsheet Row Most Recent Value   Delivery method UPS   Delivery address verified with patient/caregiver? Yes   Delivery address Home   Number of medications in delivery 1   Medication(s) being filled and delivered Evolocumab (REPATHA)   Doses left of specialty medications 1 pen   Copay verified? Yes   Copay amount 0.00   Copay form of payment No copayment ($0)   Ship Date 1/2/25   Delivery Date 1/3/25   Signature Required No                   Follow-up: 28 day(s)     Evy Garcia, Pharmacy Technician  Specialty Pharmacy Technician

## 2025-01-07 RX ORDER — LINACLOTIDE 290 UG/1
290 CAPSULE, GELATIN COATED ORAL
Qty: 90 CAPSULE | Refills: 3 | OUTPATIENT
Start: 2025-01-07

## 2025-01-15 ENCOUNTER — OFFICE VISIT (OUTPATIENT)
Dept: CARDIOLOGY | Facility: CLINIC | Age: 65
End: 2025-01-15
Payer: MEDICARE

## 2025-01-15 VITALS
SYSTOLIC BLOOD PRESSURE: 116 MMHG | HEIGHT: 75 IN | DIASTOLIC BLOOD PRESSURE: 78 MMHG | HEART RATE: 78 BPM | WEIGHT: 214.8 LBS | BODY MASS INDEX: 26.71 KG/M2 | OXYGEN SATURATION: 98 %

## 2025-01-15 DIAGNOSIS — E78.00 PURE HYPERCHOLESTEROLEMIA: Primary | Chronic | ICD-10-CM

## 2025-01-15 NOTE — PROGRESS NOTES
"[facility] Cardiology  Office visit  Tyler Doss  1960  [phone]  There is no work phone number on file.    VISIT DATE:  1/15/2025    PCP: Lainey Mcghee MD  [address] [address]    CC:  Chief Complaint   Patient presents with    Pure hypercholesterolemia     6-Mo F/U       Previous cardiac studies and procedures:  October/November 2021-TIA   CTA head neck and MRI brain unremarkable  TTE  Estimated left ventricular EF = 70% Left ventricular ejection fraction appears to be 66 - 70%. Left ventricular systolic function is normal.  Left ventricular diastolic function was normal.  Saline test results are negative.  Event monitor: Normal    ASSESSMENT:   Diagnosis Plan   1. Pure hypercholesterolemia            PLAN:  Hyperlipidemia: Goal LDL less than 70.  intolerant to even low-dose statin therapy.  Excellent response to PCSK9 inhibitor, continue therapy.    Subjective  Interval; blood pressures running less than 130/80 mmHg.  Exercising on a regular basis.  Excellent response to PCSK9 inhibitor.    Initial evaluation: 73-year-old gentleman with history of hyperlipidemia and TIA. Recent worsening baseline lipid profile. He has been intolerant to multiple statins and is currently experiencing myalgias on rosuvastatin 5 mg 3 times per week. Maintains an active lifestyle without limitation. Blood pressures running less than 130/80 mmHg. Reviewed recent laboratory evaluation.     PHYSICAL EXAMINATION:  Vitals:    01/15/25 1113   BP: 116/78   BP Location: Left arm   Patient Position: Sitting   Cuff Size: Adult   Pulse: 78   SpO2: 98%   Weight: 97.4 kg (214 lb 12.8 oz)   Height: 190.5 cm (75\")     General Appearance:    Alert, cooperative, no distress, appears stated age   Head:    Normocephalic, without obvious abnormality, atraumatic   Eyes:    conjunctiva/corneas clear   Nose:   Nares normal, septum midline, mucosa normal, no drainage   Throat: "   Lips, teeth and gums normal   Neck:   Supple, symmetrical, trachea midline, no carotid    bruit or JVD   Lungs:     Clear to auscultation bilaterally, respirations unlabored   Chest Wall:    No tenderness or deformity    Heart:    Regular rate and rhythm, S1 and S2 normal, no murmur, rub   or gallop, normal carotid impulse bilaterally without bruit.   Abdomen:     Soft, non-tender   Extremities:   Extremities normal, atraumatic, no cyanosis or edema   Pulses:   2+ and symmetric all extremities   Skin:   Skin color, texture, turgor normal, no rashes or lesions       Diagnostic Data:  Procedures  Lab Results   Component Value Date    TRIG 118 03/07/2023    HDL 41 03/07/2023     Lab Results   Component Value Date    GLUCOSE 83 03/07/2023    BUN 11 03/07/2023    CREATININE 0.89 03/07/2023     (L) 03/07/2023    K 4.3 03/07/2023     03/07/2023    CO2 25.0 03/07/2023     Lab Results   Component Value Date    HGBA1C 5.30 10/18/2021     Lab Results   Component Value Date    WBC 5.54 03/07/2023    HGB 15.5 03/07/2023    HCT 46.0 03/07/2023     03/07/2023       Allergies  Allergies   Allergen Reactions    Diamox [Acetazolamide] Diarrhea    Gramineae Pollens Irritability    Motegrity [Prucalopride] Dizziness     Headache, increased pulse rate, stomach cramps, dizziness and fatigue.    Other Irritability    Atorvastatin Myalgia    Penicillins Rash    Pravastatin Myalgia       Current Medications    Current Outpatient Medications:     aspirin 81 MG chewable tablet, Chew 1 tablet Daily., Disp: 30 tablet, Rfl: 0    azelastine (Astelin) 0.1 % nasal spray, 2 sprays each mostril bid, Disp: 3 each, Rfl: 3    clonazePAM (KlonoPIN) 0.5 MG tablet, 1 qd prn anxiety, Disp: 90 tablet, Rfl: 0    Evolocumab (REPATHA) solution auto-injector SureClick injection, Inject 1 mL under the skin into the appropriate area as directed Every 14 (Fourteen) Days., Disp: 2 mL, Rfl: 11    finasteride (PROSCAR) 5 MG tablet, , Disp: , Rfl:      fluticasone (FLONASE) 50 MCG/ACT nasal spray, Administer 2 sprays into the nostril(s) as directed by provider Daily., Disp: , Rfl:     gabapentin (NEURONTIN) 300 MG capsule, TAKE 2 CAPSULES BY MOUTH  EVERY NIGHT AT BEDTIME, Disp: 180 capsule, Rfl: 0    levothyroxine (Synthroid) 50 MCG tablet, Take 1 tablet by mouth Daily., Disp: 90 tablet, Rfl: 1    linaclotide (Linzess) 290 MCG capsule capsule, Take 1 capsule by mouth Every Morning Before Breakfast., Disp: 90 capsule, Rfl: 1    omeprazole (priLOSEC) 20 MG capsule, Take 1 capsule by mouth Daily., Disp: , Rfl:     rOPINIRole (REQUIP) 0.25 MG tablet, Take 1 hour before bedtime.1 qhs, Disp: 90 tablet, Rfl: 1    sildenafil (VIAGRA) 50 MG tablet, Take 1 tablet by mouth Daily As Needed for Erectile Dysfunction., Disp: 30 tablet, Rfl: 5          ROS  ROS      SOCIAL HX  Social History     Socioeconomic History    Marital status:    Tobacco Use    Smoking status: Never     Passive exposure: Past    Smokeless tobacco: Never   Vaping Use    Vaping status: Never Used   Substance and Sexual Activity    Alcohol use: Not Currently    Drug use: No    Sexual activity: Yes     Partners: Female     Birth control/protection: Vasectomy       FAMILY HX  Family History   Problem Relation Age of Onset    Ovarian cancer Mother 59        thyroid/goiter    Thyroid disease Mother     Heart attack Father 66        crohn's disease    Crohn's disease Father     Diabetes Sister     Hypothyroidism Sister     Stroke Sister 51        ? Stroke shown on MRI    Hyperlipidemia Sister         had TIA May 2024 Nov 2024    Cancer Sister     Atrial fibrillation Sister     Thyroid disease Brother     Hyperlipidemia Brother         Parkinsons    Atrial fibrillation Brother     Colon polyps Brother     Parkinsonism Brother         diagnosed at the age of 73    Uterine cancer Paternal Grandmother 40    Thyroid disease Paternal Grandmother     Stroke Paternal Grandfather     Immunodeficiency Son          IgA deficiency    Heart attack Paternal Uncle         heart attack    Heart disease Other     Depression Other     Prostate cancer Other     Ovarian cancer Other              Matt Cantrell III, MD, FACC

## 2025-01-29 ENCOUNTER — SPECIALTY PHARMACY (OUTPATIENT)
Dept: CARDIOLOGY | Facility: CLINIC | Age: 65
End: 2025-01-29
Payer: MEDICARE

## 2025-01-29 NOTE — PROGRESS NOTES
Specialty Pharmacy Refill Coordination Note     Tyler is a 64 y.o. male contacted today regarding refills of Repatha 140 mg SC every 14 days specialty medication(s).    Delivery scheduled to home address for Friday.    Specialty medication(s) and dose(s) confirmed: yes    Refill Questions      Flowsheet Row Most Recent Value   Changes to allergies? No   Changes to medications? No   New conditions or infections since last clinic visit No   Unplanned office visit, urgent care, ED, or hospital admission in the last 4 weeks  No   How does patient/caregiver feel medication is working? Very good   Financial problems or insurance changes  No   If yes, describe changes in insurance or financial issues. n/a   Since the previous refill, were any specialty medication doses or scheduled injections missed or delayed?  No   Does this patient require a clinical escalation to a pharmacist? No            Delivery Questions      Flowsheet Row Most Recent Value   Delivery method UPS   Delivery address verified with patient/caregiver? Yes   Delivery address Home   Number of medications in delivery 1   Medication(s) being filled and delivered Evolocumab (REPATHA)   Doses left of specialty medications 1 dose due this week   Copay verified? Yes   Copay amount 0.00   Copay form of payment No copayment ($0)   Ship Date 1/30/25   Delivery Date Selection 01/31/25   Signature Required No                   Follow-up: 84 day(s)     Evy Garcia, Pharmacy Technician  Specialty Pharmacy Technician

## 2025-04-22 ENCOUNTER — SPECIALTY PHARMACY (OUTPATIENT)
Dept: CARDIOLOGY | Facility: CLINIC | Age: 65
End: 2025-04-22
Payer: MEDICARE

## 2025-04-22 NOTE — PROGRESS NOTES
Specialty Pharmacy Patient Management Program  Cardiology Specialty Pharmacy Refill Outreach      Tyler is a 64 y.o. male contacted today regarding refills of his medication(s).    Specialty medication(s) and dose(s) confirmed: Repatha 140 mg SC every 14 days.    Other medications being refilled: n/a    Refill Questions      Flowsheet Row Most Recent Value   Changes to allergies? No   Changes to medications? No   New conditions or infections since last clinic visit No   Unplanned office visit, urgent care, ED, or hospital admission in the last 4 weeks  No   How does patient/caregiver feel medication is working? Very good   Financial problems or insurance changes  No   If yes, describe changes in insurance or financial issues. n/a   Since the previous refill, were any specialty medication doses or scheduled injections missed or delayed?  No   Does this patient require a clinical escalation to a pharmacist? No            Delivery Questions      Flowsheet Row Most Recent Value   Delivery method UPS   Delivery address Home   Other address preferred n/a   Number of medications in delivery 1   Medication(s) being filled and delivered Evolocumab (REPATHA)   Doses left of specialty medications 1 pen   Copay verified? Yes   Copay amount 0.00   Copay form of payment No copayment ($0)   Delivery Date Selection 04/24/25   Signature Required No   Do you consent to receive electronic handouts?  No                   Follow-up: 84 days     Evy Garcia CPhT  Pharmacy Care Coordinator  4/22/2025  13:09 EDT

## 2025-04-23 ENCOUNTER — SPECIALTY PHARMACY (OUTPATIENT)
Facility: HOSPITAL | Age: 65
End: 2025-04-23
Payer: MEDICARE

## 2025-04-23 NOTE — PROGRESS NOTES
Specialty Pharmacy Patient Management Program  Per Protocol Prescription Order/Refill     Patient currently fills medications at [facility] and is enrolled in an Cardiology Patient Management Program.     Requested Prescriptions     Signed Prescriptions Disp Refills    Evolocumab (REPATHA) solution auto-injector SureClick injection 6 mL 3     Sig: Inject 1 mL under the skin into the appropriate area as directed Every 14 (Fourteen) Days.     Prescription orders above were sent to the pharmacy per Collaborative Care Agreement Protocol.     Last Office Visit: 1/15/25  Next Office Visit: 1/21/26

## 2025-06-18 ENCOUNTER — SPECIALTY PHARMACY (OUTPATIENT)
Facility: HOSPITAL | Age: 65
End: 2025-06-18
Payer: MEDICARE

## 2025-06-18 NOTE — PROGRESS NOTES
Specialty Pharmacy Patient Management Program  Cardiology Reassessment     Tyler Doss was referred by a Cardiology provider to the Cardiology Patient Management program offered by [facility] for Hyperlipidemia. A follow-up outreach was conducted, including assessment of continued therapy appropriateness, medication adherence, and side effect incidence and management for Repatha.    Changes to Insurance Coverage or Financial Support  No changes    Relevant Past Medical History and Comorbidities  Relevant medical history and concomitant health conditions were discussed with the patient. The patient's chart has been reviewed for relevant past medical history and comorbid health conditions and updated as necessary.   Past Medical History:   Diagnosis Date    Allergic     Anxiety     Asthma     Chronic constipation     neg celiac panel, including ttg IgG    Colon polyp 2020    Constipation     Depression     Diverticulosis 2020    by colon    Eye exam, routine 2011    GERD (gastroesophageal reflux disease)     H/O cardiovascular stress test 10/2015    neg    H/O colonoscopy 08/2015     dilated and slightly tortuos colon    H/O echocardiogram 10/2015    neg    H/O x-ray of lumbar spine 02/2015    hypertrophic facet changes l3-s1, significant     History of dental examination 06/2012    Hyperlipidemia     IgA deficiency     mild    Prostatitis     Retinal tear of left eye 06/2018    Rotator cuff syndrome 4/6/2023    pain in shoulder when reaching backward or extending forward    Screening PSA (prostate specific antigen) 11/2015    0.3    Seasonal allergies     Stroke      Social History     Socioeconomic History    Marital status:    Tobacco Use    Smoking status: Never     Passive exposure: Past    Smokeless tobacco: Never   Vaping Use    Vaping status: Never Used   Substance and Sexual Activity    Alcohol use: Not Currently    Drug use: No    Sexual activity: Yes     Partners:  Female     Birth control/protection: Vasectomy     Problem list reviewed by Keiry Bursh, PharmD on 6/18/2025 at  2:10 PM    Hospitalizations and Urgent Care Since Last Assessment  ED Visits, Admissions, or Hospitalizations: None  Urgent Office Visits: None    Allergies  Known allergies and reactions were discussed with the patient. The patient's chart has been reviewed for allergy information and updated as necessary.   Allergies   Allergen Reactions    Diamox [Acetazolamide] Diarrhea    Gramineae Pollens Irritability    Motegrity [Prucalopride] Dizziness     Headache, increased pulse rate, stomach cramps, dizziness and fatigue.    Other Irritability    Atorvastatin Myalgia    Penicillins Rash    Pravastatin Myalgia     Allergies reviewed by Keiry Brush, PharmD on 6/18/2025 at  2:10 PM    Relevant Laboratory Values  Relevant laboratory values were discussed with the patient. The following specialty medication dose adjustment(s) are recommended: No changes    Lab Results   Component Value Date    GLUCOSE 83 03/07/2023    CALCIUM 9.6 03/07/2023     (L) 03/07/2023    K 4.3 03/07/2023    CO2 25.0 03/07/2023     03/07/2023    BUN 11 03/07/2023    CREATININE 0.89 03/07/2023    EGFRIFNONA 74 02/21/2022    BCR 12.4 03/07/2023    ANIONGAP 10.0 03/07/2023     Lab Results   Component Value Date    CHOL 153 03/07/2023    TRIG 118 03/07/2023    HDL 41 03/07/2023    LDL 91 03/07/2023       Current Medication List  This medication list has been reviewed with the patient and evaluated for any interactions or necessary modifications/recommendations, and updated to include all prescription medications, OTC medications, and supplements the patient is currently taking.  This list reflects what is contained in the patient's profile, which has also been marked as reviewed to communicate to other providers it is the most up to date version of the patient's current medication therapy.     Current Outpatient  Medications:     aspirin 81 MG chewable tablet, Chew 1 tablet Daily., Disp: 30 tablet, Rfl: 0    azelastine (Astelin) 0.1 % nasal spray, 2 sprays each mostril bid, Disp: 3 each, Rfl: 3    clonazePAM (KlonoPIN) 0.5 MG tablet, 1 qd prn anxiety, Disp: 90 tablet, Rfl: 0    Evolocumab (REPATHA) solution auto-injector SureClick injection, Inject 1 mL under the skin into the appropriate area as directed Every 14 (Fourteen) Days., Disp: 6 mL, Rfl: 3    finasteride (PROSCAR) 5 MG tablet, , Disp: , Rfl:     fluticasone (FLONASE) 50 MCG/ACT nasal spray, Administer 2 sprays into the nostril(s) as directed by provider Daily., Disp: , Rfl:     gabapentin (NEURONTIN) 300 MG capsule, TAKE 2 CAPSULES BY MOUTH  EVERY NIGHT AT BEDTIME, Disp: 180 capsule, Rfl: 0    levothyroxine (Synthroid) 50 MCG tablet, Take 1 tablet by mouth Daily., Disp: 90 tablet, Rfl: 1    linaclotide (Linzess) 290 MCG capsule capsule, Take 1 capsule by mouth Every Morning Before Breakfast., Disp: 90 capsule, Rfl: 1    omeprazole (priLOSEC) 20 MG capsule, Take 1 capsule by mouth Daily., Disp: , Rfl:     rOPINIRole (REQUIP) 0.25 MG tablet, Take 1 hour before bedtime.1 qhs, Disp: 90 tablet, Rfl: 1    sildenafil (VIAGRA) 50 MG tablet, Take 1 tablet by mouth Daily As Needed for Erectile Dysfunction., Disp: 30 tablet, Rfl: 5    Medicines reviewed by Keiry Brush, PharmD on 6/18/2025 at  2:10 PM    Drug Interactions  None    Adverse Drug Reactions  Medication tolerability: Tolerating with no to minimal ADRs  Medication plan: Continue therapy with normal follow-up  Plan for ADR Management: N/A    Adherence, Self-Administration, and Current Therapy Problems  Adherence related to the patient's specialty therapy was discussed with the patient. The Adherence segment of this outreach has been reviewed and updated.     Adherence Questions  Linked Medication(s) Assessed: Evolocumab (REPATHA)  On average, how many doses/injections does the patient miss per month?: 0  What  are the identified reasons for non-adherence or missed doses? : no problems identified  What is the estimated medication adherence level?: %  Based on the patient/caregiver response and refill history, does this patient require an MTP to track adherence improvements?: no    Additional Barriers to Patient Self-Administration: N/A  Methods for Supporting Patient Self-Administration: N/A    Open Medication Therapy Problems  No medication therapy recommendations to display    Goals of Therapy  Goals related to the patient's specialty therapy were discussed with the patient. The Patient Goals segment of this outreach has been reviewed and updated.   Goals Addressed Today        Specialty Pharmacy General Goal      LDL Goal < 70 mg/dL    Lab Results   Component Value Date    LDL 91 03/07/2023    LDL 65 02/21/2022     (H) 10/18/2021     (H) 02/05/2021     (H) 12/26/2019 12/20/24: Patient had repeat LDL drawn at PCP; attempting to locate results. He verbalized improvement in LDL- continue Repatha and will locate recent labs   6/18/25: Patient had LDL at PCP office last month and per patient LDL decreased to 17 mg/dL and his PCP advised changing Repatha to monthly. Will have labs faxed for our records and will reach out to MD about dose change.               Quality of Life Assessment   Quality of Life related to the patient's enrollment in the patient management program and services provided was discussed with the patient. The QOL segment of this outreach has been reviewed and updated.  Quality of Life Improvement Scale: 9-A good deal better    Reassessment Plan & Follow-Up  1. Medication Therapy Changes: Continue Repatha 140mg subcutaneous every 14 days   2. Related Plans, Therapy Recommendations, or Issues to Be Addressed: Will get most recent LDL lab from PCP and see if provider is agreeable to PCP's recommendation to change to monthly dosing.   3. Pharmacist to perform regular assessments  no more than (6) months from the previous assessment.  4. Care Coordinator to set up future refill outreaches, coordinate prescription delivery, and escalate clinical questions to pharmacist.    Attestation  Therapeutic appropriateness: Appropriate   I attest the patient was actively involved in and has agreed to the above plan of care.  If the prescribed therapy is at any point deemed not appropriate based on the current or future assessments, a consultation will be initiated with the patient's specialty care provider to determine the best course of action. The revised plan of therapy will be documented along with any required assessments and/or additional patient education provided.     Keiry Brush, PharmD, [facility]  Clinical Specialty Pharmacist, Cardiology  6/18/2025  14:12 EDT

## 2025-08-21 RX ORDER — SODIUM, POTASSIUM,MAG SULFATES 17.5-3.13G
SOLUTION, RECONSTITUTED, ORAL ORAL
Qty: 354 ML | Refills: 0 | Status: SHIPPED | OUTPATIENT
Start: 2025-08-21